# Patient Record
Sex: MALE | Race: WHITE | Employment: FULL TIME | URBAN - METROPOLITAN AREA
[De-identification: names, ages, dates, MRNs, and addresses within clinical notes are randomized per-mention and may not be internally consistent; named-entity substitution may affect disease eponyms.]

---

## 2017-01-30 RX ORDER — TRAMADOL HYDROCHLORIDE 50 MG/1
50 TABLET ORAL EVERY 6 HOURS PRN
Qty: 60 TABLET | Refills: 0 | Status: SHIPPED | OUTPATIENT
Start: 2017-01-30 | End: 2017-02-09

## 2017-02-06 ENCOUNTER — OFFICE VISIT (OUTPATIENT)
Dept: ORTHOPEDIC SURGERY | Age: 52
End: 2017-02-06

## 2017-02-06 ENCOUNTER — TELEPHONE (OUTPATIENT)
Dept: ORTHOPEDIC SURGERY | Age: 52
End: 2017-02-06

## 2017-02-06 VITALS
WEIGHT: 253 LBS | DIASTOLIC BLOOD PRESSURE: 79 MMHG | TEMPERATURE: 98.2 F | SYSTOLIC BLOOD PRESSURE: 136 MMHG | HEART RATE: 86 BPM | BODY MASS INDEX: 35.42 KG/M2 | HEIGHT: 71 IN

## 2017-02-06 DIAGNOSIS — M25.562 LEFT KNEE PAIN, UNSPECIFIED CHRONICITY: Primary | ICD-10-CM

## 2017-02-06 PROCEDURE — 73562 X-RAY EXAM OF KNEE 3: CPT | Performed by: PHYSICIAN ASSISTANT

## 2017-02-06 PROCEDURE — 99213 OFFICE O/P EST LOW 20 MIN: CPT | Performed by: PHYSICIAN ASSISTANT

## 2017-02-08 ENCOUNTER — TELEPHONE (OUTPATIENT)
Dept: ORTHOPEDIC SURGERY | Age: 52
End: 2017-02-08

## 2017-02-09 ENCOUNTER — HOSPITAL ENCOUNTER (OUTPATIENT)
Dept: MRI IMAGING | Age: 52
Discharge: HOME | End: 2017-02-09
Attending: ORTHOPAEDIC SURGERY | Admitting: ORTHOPAEDIC SURGERY

## 2017-02-09 DIAGNOSIS — M25.562 LEFT KNEE PAIN, UNSPECIFIED CHRONICITY: ICD-10-CM

## 2017-02-09 DIAGNOSIS — M25.562 PAIN IN LEFT KNEE: ICD-10-CM

## 2017-02-13 ENCOUNTER — TELEPHONE (OUTPATIENT)
Dept: ORTHOPEDIC SURGERY | Age: 52
End: 2017-02-13

## 2017-02-13 ENCOUNTER — OFFICE VISIT (OUTPATIENT)
Dept: ORTHOPEDIC SURGERY | Age: 52
End: 2017-02-13

## 2017-02-13 VITALS
WEIGHT: 253 LBS | HEART RATE: 72 BPM | BODY MASS INDEX: 35.42 KG/M2 | SYSTOLIC BLOOD PRESSURE: 138 MMHG | HEIGHT: 71 IN | TEMPERATURE: 97.9 F | DIASTOLIC BLOOD PRESSURE: 85 MMHG

## 2017-02-13 DIAGNOSIS — M17.12 PRIMARY OSTEOARTHRITIS OF LEFT KNEE: Primary | ICD-10-CM

## 2017-02-13 PROCEDURE — 99214 OFFICE O/P EST MOD 30 MIN: CPT | Performed by: ORTHOPAEDIC SURGERY

## 2017-02-13 PROCEDURE — 20610 DRAIN/INJ JOINT/BURSA W/O US: CPT | Performed by: ORTHOPAEDIC SURGERY

## 2017-02-27 ENCOUNTER — OFFICE VISIT (OUTPATIENT)
Dept: ORTHOPEDIC SURGERY | Age: 52
End: 2017-02-27

## 2017-02-27 DIAGNOSIS — M17.11 PRIMARY OSTEOARTHRITIS OF RIGHT KNEE: ICD-10-CM

## 2017-02-27 DIAGNOSIS — M17.12 PRIMARY OSTEOARTHRITIS OF LEFT KNEE: Primary | ICD-10-CM

## 2017-02-27 PROCEDURE — L1812 KO ELASTIC W/JOINTS PRE OTS: HCPCS | Performed by: PHYSICIAN ASSISTANT

## 2017-02-27 PROCEDURE — 20610 DRAIN/INJ JOINT/BURSA W/O US: CPT | Performed by: PHYSICIAN ASSISTANT

## 2017-02-27 PROCEDURE — 99213 OFFICE O/P EST LOW 20 MIN: CPT | Performed by: PHYSICIAN ASSISTANT

## 2017-02-27 RX ORDER — TRAMADOL HYDROCHLORIDE 50 MG/1
50 TABLET ORAL EVERY 6 HOURS PRN
Qty: 60 TABLET | Refills: 0 | Status: SHIPPED | OUTPATIENT
Start: 2017-02-27 | End: 2017-03-09

## 2017-03-06 ENCOUNTER — OFFICE VISIT (OUTPATIENT)
Dept: ORTHOPEDIC SURGERY | Age: 52
End: 2017-03-06

## 2017-03-06 VITALS — WEIGHT: 248 LBS | BODY MASS INDEX: 33.59 KG/M2 | HEIGHT: 72 IN

## 2017-03-06 DIAGNOSIS — M17.0 PRIMARY OSTEOARTHRITIS OF BOTH KNEES: Primary | ICD-10-CM

## 2017-03-06 PROCEDURE — 20610 DRAIN/INJ JOINT/BURSA W/O US: CPT | Performed by: PHYSICIAN ASSISTANT

## 2017-03-13 ENCOUNTER — OFFICE VISIT (OUTPATIENT)
Dept: ORTHOPEDIC SURGERY | Age: 52
End: 2017-03-13

## 2017-03-13 DIAGNOSIS — M17.11 PRIMARY OSTEOARTHRITIS OF RIGHT KNEE: Primary | ICD-10-CM

## 2017-03-13 PROCEDURE — 20610 DRAIN/INJ JOINT/BURSA W/O US: CPT | Performed by: PHYSICIAN ASSISTANT

## 2017-03-14 ENCOUNTER — TELEPHONE (OUTPATIENT)
Dept: ORTHOPEDIC SURGERY | Age: 52
End: 2017-03-14

## 2017-05-24 RX ORDER — TRAMADOL HYDROCHLORIDE 50 MG/1
50 TABLET ORAL EVERY 8 HOURS PRN
Qty: 30 TABLET | Refills: 0 | Status: SHIPPED | OUTPATIENT
Start: 2017-05-24 | End: 2017-07-10 | Stop reason: SDUPTHER

## 2017-07-10 RX ORDER — TRAMADOL HYDROCHLORIDE 50 MG/1
50 TABLET ORAL EVERY 8 HOURS PRN
Qty: 30 TABLET | Refills: 0 | Status: SHIPPED | OUTPATIENT
Start: 2017-07-10 | End: 2017-07-13 | Stop reason: SDUPTHER

## 2017-07-13 ENCOUNTER — TELEPHONE (OUTPATIENT)
Dept: ORTHOPEDIC SURGERY | Age: 52
End: 2017-07-13

## 2017-07-13 ENCOUNTER — OFFICE VISIT (OUTPATIENT)
Dept: ORTHOPEDIC SURGERY | Age: 52
End: 2017-07-13

## 2017-07-13 VITALS
WEIGHT: 248 LBS | BODY MASS INDEX: 34.72 KG/M2 | HEART RATE: 60 BPM | HEIGHT: 71 IN | DIASTOLIC BLOOD PRESSURE: 86 MMHG | RESPIRATION RATE: 16 BRPM | TEMPERATURE: 96.6 F | SYSTOLIC BLOOD PRESSURE: 135 MMHG

## 2017-07-13 DIAGNOSIS — M17.11 PRIMARY OSTEOARTHRITIS OF RIGHT KNEE: ICD-10-CM

## 2017-07-13 DIAGNOSIS — M17.12 PRIMARY OSTEOARTHRITIS OF LEFT KNEE: Primary | ICD-10-CM

## 2017-07-13 PROCEDURE — 99213 OFFICE O/P EST LOW 20 MIN: CPT | Performed by: PHYSICIAN ASSISTANT

## 2017-07-13 PROCEDURE — 20610 DRAIN/INJ JOINT/BURSA W/O US: CPT | Performed by: PHYSICIAN ASSISTANT

## 2017-07-13 RX ORDER — TRAMADOL HYDROCHLORIDE 50 MG/1
50 TABLET ORAL EVERY 8 HOURS PRN
Qty: 30 TABLET | Refills: 0 | Status: SHIPPED | OUTPATIENT
Start: 2017-07-13 | End: 2017-07-24

## 2017-07-14 ENCOUNTER — TELEPHONE (OUTPATIENT)
Dept: ORTHOPEDIC SURGERY | Age: 52
End: 2017-07-14

## 2017-07-24 ENCOUNTER — OFFICE VISIT (OUTPATIENT)
Dept: SURGERY | Age: 52
End: 2017-07-24

## 2017-07-24 VITALS — BODY MASS INDEX: 34.73 KG/M2 | DIASTOLIC BLOOD PRESSURE: 70 MMHG | WEIGHT: 249 LBS | SYSTOLIC BLOOD PRESSURE: 110 MMHG

## 2017-07-24 DIAGNOSIS — R10.33 PERIUMBILICAL PAIN: Primary | ICD-10-CM

## 2017-07-24 PROCEDURE — 99999 PR OFFICE/OUTPT VISIT,PROCEDURE ONLY: CPT | Performed by: SURGERY

## 2017-07-28 ENCOUNTER — PROCEDURE VISIT (OUTPATIENT)
Dept: SURGERY | Age: 52
End: 2017-07-28

## 2017-07-28 VITALS — SYSTOLIC BLOOD PRESSURE: 130 MMHG | DIASTOLIC BLOOD PRESSURE: 76 MMHG

## 2017-07-28 DIAGNOSIS — R10.33 PERIUMBILICAL PAIN: Primary | ICD-10-CM

## 2017-07-28 PROCEDURE — 99024 POSTOP FOLLOW-UP VISIT: CPT | Performed by: SURGERY

## 2017-09-08 ENCOUNTER — OFFICE VISIT (OUTPATIENT)
Dept: ORTHOPEDIC SURGERY | Age: 52
End: 2017-09-08

## 2017-09-08 ENCOUNTER — TELEPHONE (OUTPATIENT)
Dept: ORTHOPEDIC SURGERY | Age: 52
End: 2017-09-08

## 2017-09-08 VITALS
DIASTOLIC BLOOD PRESSURE: 95 MMHG | RESPIRATION RATE: 17 BRPM | HEIGHT: 71 IN | SYSTOLIC BLOOD PRESSURE: 142 MMHG | WEIGHT: 248.9 LBS | BODY MASS INDEX: 34.85 KG/M2 | HEART RATE: 84 BPM

## 2017-09-08 DIAGNOSIS — M17.0 PRIMARY OSTEOARTHRITIS OF BOTH KNEES: Primary | ICD-10-CM

## 2017-09-08 PROCEDURE — 99213 OFFICE O/P EST LOW 20 MIN: CPT | Performed by: PHYSICIAN ASSISTANT

## 2017-09-08 PROCEDURE — 20610 DRAIN/INJ JOINT/BURSA W/O US: CPT | Performed by: PHYSICIAN ASSISTANT

## 2017-09-08 RX ORDER — TRAMADOL HYDROCHLORIDE 50 MG/1
50 TABLET ORAL PRN
Qty: 30 TABLET | Refills: 0 | Status: SHIPPED | OUTPATIENT
Start: 2017-09-08 | End: 2017-09-28 | Stop reason: SDUPTHER

## 2017-09-12 ENCOUNTER — TELEPHONE (OUTPATIENT)
Dept: ORTHOPEDIC SURGERY | Age: 52
End: 2017-09-12

## 2017-09-13 ENCOUNTER — TELEPHONE (OUTPATIENT)
Dept: ORTHOPEDIC SURGERY | Age: 52
End: 2017-09-13

## 2017-09-15 ENCOUNTER — OFFICE VISIT (OUTPATIENT)
Dept: ORTHOPEDIC SURGERY | Age: 52
End: 2017-09-15

## 2017-09-15 VITALS — WEIGHT: 248 LBS | HEIGHT: 70 IN | BODY MASS INDEX: 35.5 KG/M2

## 2017-09-15 DIAGNOSIS — M25.561 CHRONIC PAIN OF BOTH KNEES: Primary | ICD-10-CM

## 2017-09-15 DIAGNOSIS — M25.562 CHRONIC PAIN OF BOTH KNEES: Primary | ICD-10-CM

## 2017-09-15 DIAGNOSIS — G89.29 CHRONIC PAIN OF BOTH KNEES: Primary | ICD-10-CM

## 2017-09-15 DIAGNOSIS — M17.0 PRIMARY OSTEOARTHRITIS OF BOTH KNEES: ICD-10-CM

## 2017-09-15 PROCEDURE — 99213 OFFICE O/P EST LOW 20 MIN: CPT | Performed by: PHYSICIAN ASSISTANT

## 2017-09-15 PROCEDURE — 20610 DRAIN/INJ JOINT/BURSA W/O US: CPT | Performed by: PHYSICIAN ASSISTANT

## 2017-09-25 ENCOUNTER — OFFICE VISIT (OUTPATIENT)
Dept: ORTHOPEDIC SURGERY | Age: 52
End: 2017-09-25

## 2017-09-25 VITALS — BODY MASS INDEX: 35.5 KG/M2 | HEIGHT: 70 IN | WEIGHT: 248 LBS

## 2017-09-25 DIAGNOSIS — M17.0 PRIMARY OSTEOARTHRITIS OF BOTH KNEES: Primary | ICD-10-CM

## 2017-09-25 PROCEDURE — 20610 DRAIN/INJ JOINT/BURSA W/O US: CPT | Performed by: PHYSICIAN ASSISTANT

## 2017-09-28 ENCOUNTER — TELEPHONE (OUTPATIENT)
Dept: ORTHOPEDIC SURGERY | Age: 52
End: 2017-09-28

## 2017-09-28 RX ORDER — TRAMADOL HYDROCHLORIDE 50 MG/1
50 TABLET ORAL EVERY 8 HOURS PRN
Qty: 30 TABLET | Refills: 0 | Status: SHIPPED | OUTPATIENT
Start: 2017-09-28 | End: 2017-10-16 | Stop reason: SDUPTHER

## 2017-10-05 ENCOUNTER — OFFICE VISIT (OUTPATIENT)
Dept: ORTHOPEDIC SURGERY | Age: 52
End: 2017-10-05

## 2017-10-05 DIAGNOSIS — M17.0 PRIMARY OSTEOARTHRITIS OF BOTH KNEES: Primary | ICD-10-CM

## 2017-10-05 PROCEDURE — 20610 DRAIN/INJ JOINT/BURSA W/O US: CPT | Performed by: PHYSICIAN ASSISTANT

## 2017-10-05 NOTE — MR AVS SNAPSHOT
After Visit Summary             Rey Garcia   10/5/2017 3:45 PM   Office Visit    Description:  Male : 1965   Provider:  BROCK Reynolds   Department:  [address] [facility]              Your Follow-Up and Future Appointments         Below is a list of your follow-up and future appointments. This may not be a complete list as you may have made appointments directly with providers that we are not aware of or your providers may have made some for you. Please call your providers to confirm appointments. It is important to keep your appointments. Please bring your current insurance card, photo ID, co-pay, and all medication bottles to your appointment. If self-pay, payment is expected at the time of service. Your To-Do List     Future Appointments Provider Department Dept Phone    3/15/2018 1:30 PM Moisés Rodas MD [address] [facility] [phone]    3/21/2018 7:30 AM Moisés Rodas MD; [address] [facility] [phone]    3/21/2018 7:35 AM Moisés Rodas MD [facility] [phone]    2018 10:00 AM Moisés Rodas MD [address] [facility] [phone]         Information from Your Visit        Department     Name Address Phone Fax    [address] [facility] [address] [facility]) [address] [place] [address] [address] [address] [phone]      You Were Seen for:         Comments    Primary osteoarthritis of both knees   [207895]         Vital Signs     Smoking Status                   Never Smoker           Additional Information about your Body Mass Index (BMI)           Your BMI as listed above is considered obese (30 or more). BMI is an estimate of body fat, calculated from your height and weight.   The higher your BMI, the greater your risk of heart disease, high blood pressure, type 2 diabetes, stroke, gallstones, arthritis, sleep apnea, and certain cancers. BMI is not perfect. It may overestimate body fat in athletes and people who are more muscular. Even a small weight loss (between 5 and 10 percent of your current weight) by decreasing your calorie intake and becoming more physically active will help lower your risk of developing or worsening diseases associated with obesity.      Learn more at: [employer]             Medications and Orders      Your Current Medications Are              traMADol (ULTRAM) 50 MG tablet Take 1 tablet by mouth every 8 hours as needed for Pain    diclofenac sodium (VOLTAREN) 1 % GEL APPLY 4 GRAMS TO AFFECTED AREA(S) FOUR TIMES A DAY    Lansoprazole (PREVACID PO) Take 30 mg by mouth daily       Allergies              Celebrex [Celecoxib] Itching    Aspirin-acetaminophen-caffeine Rash    Pt states can take Aspirin, tylenol and caffeine separately but not combined    Naprosyn [Naproxen] Rash    Mostly redness in the face    Other Rash    Red dye      We Ordered/Performed the following           20610 - MO DRAIN/INJECT LARGE JOINT/BURSA     EUFLEXXA INJ PER DOSE          Additional Information        Basic Information     Date Of Birth Sex Race Ethnicity Preferred Language    1965 Male White Non-/Non  English      Problem List as of 10/5/2017  Date Reviewed: 9/18/2017                Primary osteoarthritis of both knees    Metatarsalgia of right foot    Diastasis recti    Cubital tunnel syndrome    Carpal tunnel syndrome    Left knee DJD    Right knee DJD    Good's esophagus with esophagitis    Chondromalacia of left knee      Immunizations as of 10/5/2017     Name Date    Influenza Virus Vaccine 9/2/2015, 10/1/2014    Influenza Whole 10/20/2013    Tdap (Boostrix, Adacel) 1/1/2011      Preventive Care        Date Due    Hepatitis C screening is recommended for all adults regardless of risk factors born between [facility] at least once (lifetime) who have never been tested. 1965    HIV screening is recommended for all people regardless of risk factors  aged 15-65 years at least once (lifetime) who have never been HIV tested. 6/30/1978    Yearly Flu Vaccine (1) 9/1/2017    Tetanus Combination Vaccine (2 - Td) 1/1/2021    Cholesterol Screening 10/13/2021    Colonoscopy 7/6/2025            [employer] Signup           Our records indicate that you have an active [employer] account. You can view your After Visit Summary by going to [email or web address] and logging in with your [employer] username and password. If you don't have a [employer] username and password but a parent or guardian has access to your record, the parent or guardian should login with their own [employer] username and password and access your record to view the After Visit Summary. Additional Information  If you have questions, please contact the physician practice where you receive care. Remember, [employer] is NOT to be used for urgent needs. For medical emergencies, dial 911. For questions regarding your [employer] account call [phone]. If you have a clinical question, please call your doctor's office.

## 2017-10-16 RX ORDER — TRAMADOL HYDROCHLORIDE 50 MG/1
50 TABLET ORAL EVERY 8 HOURS PRN
Qty: 30 TABLET | Refills: 0 | Status: SHIPPED | OUTPATIENT
Start: 2017-10-16 | End: 2017-11-06 | Stop reason: SDUPTHER

## 2017-10-16 NOTE — TELEPHONE ENCOUNTER
Patient requesting a refill of Tramadol 50 mg, last filled 9/28/17  Preferred pharmacy -[facility] - [phone]    Patient can be reached at [phone]

## 2017-10-16 NOTE — TELEPHONE ENCOUNTER
Per FXF ok to refill  The patient has been issued narcotics to safely reduce postoperative pain and promote tolerance with physical therapies and ADL's. Reduction in dosing will be addressed with the next narcotic refill request.  Dosing is adjusted for patients with a history of chronic pain disorders.

## 2017-10-20 ENCOUNTER — TELEPHONE (OUTPATIENT)
Dept: ORTHOPEDIC SURGERY | Age: 52
End: 2017-10-20

## 2017-11-06 ENCOUNTER — TELEPHONE (OUTPATIENT)
Dept: ORTHOPEDIC SURGERY | Age: 52
End: 2017-11-06

## 2017-11-06 RX ORDER — TRAMADOL HYDROCHLORIDE 50 MG/1
50 TABLET ORAL EVERY 8 HOURS PRN
Qty: 30 TABLET | Refills: 0 | Status: SHIPPED | OUTPATIENT
Start: 2017-11-06 | End: 2017-11-27 | Stop reason: SDUPTHER

## 2017-11-06 NOTE — TELEPHONE ENCOUNTER
The patient has been issued narcotics to safely reduce postoperative pain and promote tolerance with physical therapies and ADL's. Reduction in dosing will be addressed with the next narcotic refill request.  Dosing is adjusted for patients with a history of chronic pain disorders.

## 2017-11-06 NOTE — TELEPHONE ENCOUNTER
Patient requesting a refill of tramadol 50 mg, last filled 10/16/17  Preferred pharmacy- [facility] [address]  - [phone]  Patient can be reached at [phone]

## 2017-11-08 ENCOUNTER — PAT TELEPHONE (OUTPATIENT)
Dept: PREADMISSION TESTING | Age: 52
End: 2017-11-08

## 2017-11-08 VITALS — HEIGHT: 72 IN | BODY MASS INDEX: 19.91 KG/M2 | WEIGHT: 147 LBS

## 2017-11-08 NOTE — PROGRESS NOTES
have a living will and a durable power of  for healthcare, please bring in a copy. As part of our patient safety program to minimize surgical site infections, we ask you to do the following:    · Please notify your surgeon if you develop any illness between         now and the  day of your surgery. · This includes a cough, cold, fever, sore throat, nausea,         or vomiting, and diarrhea, etc.  ·  Please notify your surgeon if you experience dizziness, shortness         of breath or blurred vision between now and the time of your surgery. You may shower the night before surgery or the morning of   your surgery with an antibacterial soap. You will need to bring a photo ID and insurance card    [facility] has an onsite pharmacy, would you like to utilize our pharmacy     If you will be staying overnight and use a C-pap machine, please bring   your C-pap to hospital     Our goal is to provide you with excellent care, therefore, visitors will be limited to two(2) in the room at a time so that we may focus on providing this care for you. Please contact pre-admission testing if you have any further questions. [facility] phone number:  [phone]  Please note these are generalized instructions for all surgical cases, you may be provided with more specific instructions according to your surgery.

## 2017-11-15 ENCOUNTER — SURG/PROC ORDERS (OUTPATIENT)
Dept: ANESTHESIOLOGY | Age: 52
End: 2017-11-15

## 2017-11-15 RX ORDER — SODIUM CHLORIDE 0.9 % (FLUSH) 0.9 %
10 SYRINGE (ML) INJECTION PRN
Status: CANCELLED | OUTPATIENT
Start: 2017-11-15

## 2017-11-15 RX ORDER — SODIUM CHLORIDE 9 MG/ML
INJECTION, SOLUTION INTRAVENOUS CONTINUOUS
Status: CANCELLED | OUTPATIENT
Start: 2017-11-15

## 2017-11-15 RX ORDER — SODIUM CHLORIDE 0.9 % (FLUSH) 0.9 %
10 SYRINGE (ML) INJECTION EVERY 12 HOURS SCHEDULED
Status: CANCELLED | OUTPATIENT
Start: 2017-11-15

## 2017-11-16 ENCOUNTER — HOSPITAL ENCOUNTER (OUTPATIENT)
Dept: ENDOSCOPY | Age: 52
Discharge: HOME | End: 2017-11-16
Attending: INTERNAL MEDICINE | Admitting: INTERNAL MEDICINE

## 2017-11-16 VITALS
OXYGEN SATURATION: 97 % | BODY MASS INDEX: 33.46 KG/M2 | WEIGHT: 247 LBS | SYSTOLIC BLOOD PRESSURE: 129 MMHG | DIASTOLIC BLOOD PRESSURE: 88 MMHG | HEIGHT: 72 IN | RESPIRATION RATE: 16 BRPM | TEMPERATURE: 97.4 F | HEART RATE: 86 BPM

## 2017-11-16 RX ORDER — SODIUM CHLORIDE 9 MG/ML
INJECTION, SOLUTION INTRAVENOUS CONTINUOUS
Status: DISCONTINUED | OUTPATIENT
Start: 2017-11-16 | End: 2017-11-17 | Stop reason: HOSPADM

## 2017-11-16 RX ORDER — NAPROXEN SODIUM 220 MG
220 TABLET ORAL 2 TIMES DAILY
Status: ON HOLD | COMMUNITY
End: 2018-03-22 | Stop reason: HOSPADM

## 2017-11-16 RX ORDER — SODIUM CHLORIDE 0.9 % (FLUSH) 0.9 %
10 SYRINGE (ML) INJECTION EVERY 12 HOURS SCHEDULED
Status: DISCONTINUED | OUTPATIENT
Start: 2017-11-16 | End: 2017-11-17 | Stop reason: HOSPADM

## 2017-11-16 RX ORDER — ONDANSETRON 2 MG/ML
4 INJECTION INTRAMUSCULAR; INTRAVENOUS
Status: ACTIVE | OUTPATIENT
Start: 2017-11-16 | End: 2017-11-16

## 2017-11-16 RX ORDER — SODIUM CHLORIDE 0.9 % (FLUSH) 0.9 %
10 SYRINGE (ML) INJECTION PRN
Status: DISCONTINUED | OUTPATIENT
Start: 2017-11-16 | End: 2017-11-17 | Stop reason: HOSPADM

## 2017-11-16 RX ADMIN — SODIUM CHLORIDE: 9 INJECTION, SOLUTION INTRAVENOUS at 09:31

## 2017-11-16 ASSESSMENT — ENCOUNTER SYMPTOMS: SHORTNESS OF BREATH: 0

## 2017-11-16 ASSESSMENT — LIFESTYLE VARIABLES: SMOKING_STATUS: 0

## 2017-11-16 ASSESSMENT — PAIN SCALES - GENERAL: PAINLEVEL_OUTOF10: 0

## 2017-11-16 ASSESSMENT — PAIN - FUNCTIONAL ASSESSMENT: PAIN_FUNCTIONAL_ASSESSMENT: 0-10

## 2017-11-16 NOTE — PROCEDURES
[address]                                  PROCEDURE NOTE    PATIENT NAME: Eric Erazo                  :        1965  MED REC NO:   [id number]                          ROOM:  ACCOUNT NO:   [de-identified]                          ADMIT DATE: 2017  PROVIDER:     Carmenza Gamboa MD    EGD NOTE    DATE OF PROCEDURE:  2017    REFERRING PROVIDER:  BROCK Martel    PATIENT HISTORY:  A 49-year-old male, outpatient. INSTRUMENT USED:  Olympus GIF-Q180. MEDICATIONS OF PROCEDURE:  The patient was premedicated with Diprivan  intravenously as administered by the anesthesiology service. INDICATIONS:  The patient has presented with recent early satiety and  weight loss. This could be related to his recent use of tramadol. Exam is  being performed to rule out a structural upper GI tract process. DESCRIPTION OF PROCEDURE:  The endoscope was inserted into the esophagus  without difficulty. The diaphragm was located at 42 cm with the Z-line at  38 cm. Several very tiny islands of apparent Good's metaplasia extended  above this level. These had been biopsied previously. The esophageal  mucosa was otherwise normal.  The stomach, duodenal bulb, and descending  duodenum were normal.    IMPRESSION:  A 4-cm hiatal hernia only. PLAN:  If the patient's symptoms have not resolved with stopping the  tramadol, I will obtain a CT scan of the abdomen, primarily to image the  pancreas. Toña Crisostomo MD    D: 2017 10:03:48       T: 2017 10:05:22     [email or web address]  Job#: [id number]     Doc#: [id number]    CC:   Dori Martel MD

## 2017-11-16 NOTE — BRIEF OP NOTE
Brief Postoperative Note    Anna Marie  YOB: 1965  [id number]    Pre-operative Diagnosis: Early satiety, weight loss    Post-operative Diagnosis: Same    Procedure: EGD    Anesthesia: MAC    Surgeons/Assistants: Federica    Estimated Blood Loss: None    Complications: None    Specimens: Was Not Obtained    Findings: See dictated report    Electronically signed by Shmuel Davis MD on 11/16/2017 at 9:49 AM

## 2017-11-27 ENCOUNTER — TELEPHONE (OUTPATIENT)
Dept: ORTHOPEDIC SURGERY | Age: 52
End: 2017-11-27

## 2017-11-27 RX ORDER — TRAMADOL HYDROCHLORIDE 50 MG/1
50 TABLET ORAL EVERY 8 HOURS PRN
Qty: 30 TABLET | Refills: 0 | Status: SHIPPED | OUTPATIENT
Start: 2017-11-27 | End: 2017-12-11 | Stop reason: SDUPTHER

## 2017-12-01 ENCOUNTER — TELEPHONE (OUTPATIENT)
Dept: FAMILY MEDICINE CLINIC | Age: 52
End: 2017-12-01

## 2017-12-11 ENCOUNTER — TELEPHONE (OUTPATIENT)
Dept: ORTHOPEDIC SURGERY | Age: 52
End: 2017-12-11

## 2017-12-11 RX ORDER — TRAMADOL HYDROCHLORIDE 50 MG/1
50 TABLET ORAL EVERY 8 HOURS PRN
Qty: 30 TABLET | Refills: 0 | Status: SHIPPED | OUTPATIENT
Start: 2017-12-11 | End: 2017-12-22 | Stop reason: SDUPTHER

## 2017-12-11 NOTE — TELEPHONE ENCOUNTER
Refill:    Tramadol 50mg   Last filled: 11/27 [address] on [employer]    Please call   [phone]

## 2017-12-20 ENCOUNTER — INITIAL CONSULT (OUTPATIENT)
Dept: SURGERY | Age: 52
End: 2017-12-20

## 2017-12-20 VITALS — DIASTOLIC BLOOD PRESSURE: 90 MMHG | SYSTOLIC BLOOD PRESSURE: 122 MMHG | WEIGHT: 248 LBS | BODY MASS INDEX: 33.63 KG/M2

## 2017-12-20 DIAGNOSIS — M62.08 DIASTASIS RECTI: Primary | ICD-10-CM

## 2017-12-20 PROCEDURE — 99213 OFFICE O/P EST LOW 20 MIN: CPT | Performed by: SURGERY

## 2017-12-22 ENCOUNTER — OFFICE VISIT (OUTPATIENT)
Dept: FAMILY MEDICINE CLINIC | Age: 52
End: 2017-12-22

## 2017-12-22 VITALS
DIASTOLIC BLOOD PRESSURE: 86 MMHG | HEIGHT: 72 IN | WEIGHT: 245.7 LBS | HEART RATE: 83 BPM | OXYGEN SATURATION: 98 % | SYSTOLIC BLOOD PRESSURE: 120 MMHG | BODY MASS INDEX: 33.28 KG/M2

## 2017-12-22 DIAGNOSIS — K22.70 BARRETT'S ESOPHAGUS WITH ESOPHAGITIS: ICD-10-CM

## 2017-12-22 DIAGNOSIS — K20.90 BARRETT'S ESOPHAGUS WITH ESOPHAGITIS: ICD-10-CM

## 2017-12-22 DIAGNOSIS — M17.0 PRIMARY OSTEOARTHRITIS OF BOTH KNEES: ICD-10-CM

## 2017-12-22 DIAGNOSIS — M62.08 DIASTASIS RECTI: ICD-10-CM

## 2017-12-22 DIAGNOSIS — Z00.00 PREVENTATIVE HEALTH CARE: Primary | ICD-10-CM

## 2017-12-22 PROCEDURE — 99396 PREV VISIT EST AGE 40-64: CPT | Performed by: PHYSICIAN ASSISTANT

## 2017-12-22 RX ORDER — TRAMADOL HYDROCHLORIDE 50 MG/1
50 TABLET ORAL 3 TIMES DAILY PRN
Qty: 60 TABLET | Refills: 2 | Status: ON HOLD | OUTPATIENT
Start: 2017-12-22 | End: 2018-03-21 | Stop reason: HOSPADM

## 2017-12-22 ASSESSMENT — ENCOUNTER SYMPTOMS
EYE PAIN: 0
SHORTNESS OF BREATH: 0
SORE THROAT: 0
DIARRHEA: 0
CHEST TIGHTNESS: 0
CONSTIPATION: 0
VOICE CHANGE: 0
TROUBLE SWALLOWING: 0
BACK PAIN: 0
COUGH: 0
ABDOMINAL PAIN: 0

## 2018-01-26 ENCOUNTER — TELEPHONE (OUTPATIENT)
Dept: FAMILY MEDICINE CLINIC | Age: 53
End: 2018-01-26

## 2018-01-26 ENCOUNTER — OFFICE VISIT (OUTPATIENT)
Dept: ORTHOPEDIC SURGERY | Age: 53
End: 2018-01-26

## 2018-01-26 VITALS
DIASTOLIC BLOOD PRESSURE: 84 MMHG | HEART RATE: 69 BPM | SYSTOLIC BLOOD PRESSURE: 136 MMHG | BODY MASS INDEX: 33.18 KG/M2 | RESPIRATION RATE: 16 BRPM | HEIGHT: 72 IN | WEIGHT: 245 LBS

## 2018-01-26 DIAGNOSIS — M17.0 PRIMARY OSTEOARTHRITIS OF BOTH KNEES: Primary | ICD-10-CM

## 2018-01-26 PROCEDURE — 99213 OFFICE O/P EST LOW 20 MIN: CPT | Performed by: PHYSICIAN ASSISTANT

## 2018-01-26 NOTE — LETTER
[facility]  [address] [facility]  [address] [facility] [address]  Phone: [phone]  Fax: [address] [facility], [address]        January 26, 2018     Patient: Emily Precise   YOB: 1965   Date of Visit: 1/26/2018       To Whom It May Concern: It is my medical opinion that Cheryl Denis requires a disability parking placard for the following reasons:  He has limited walking ability due to an orthopedic condition. Duration of need: 2 years    If you have any questions or concerns, please don't hesitate to call.     Sincerely,          BROCK Brunson

## 2018-01-26 NOTE — TELEPHONE ENCOUNTER
traMADol Prisca Sosa 50 MG tablet         Insurance / [employer]   [id number]     Pharmacy:  [facility] [address], [address] [phone] - W [phone]      Prior Auth is required

## 2018-01-31 ENCOUNTER — TELEPHONE (OUTPATIENT)
Dept: ORTHOPEDIC SURGERY | Age: 53
End: 2018-01-31

## 2018-01-31 DIAGNOSIS — R52 PAIN: Primary | ICD-10-CM

## 2018-02-14 ENCOUNTER — TELEPHONE (OUTPATIENT)
Dept: ORTHOPEDIC SURGERY | Age: 53
End: 2018-02-14

## 2018-03-06 ENCOUNTER — HOSPITAL ENCOUNTER (OUTPATIENT)
Dept: PAIN MANAGEMENT | Age: 53
Discharge: HOME | End: 2018-03-06
Attending: ANESTHESIOLOGY | Admitting: ANESTHESIOLOGY

## 2018-03-06 VITALS
RESPIRATION RATE: 16 BRPM | WEIGHT: 255 LBS | TEMPERATURE: 98.3 F | HEIGHT: 72 IN | SYSTOLIC BLOOD PRESSURE: 130 MMHG | BODY MASS INDEX: 34.54 KG/M2 | HEART RATE: 92 BPM | OXYGEN SATURATION: 97 % | DIASTOLIC BLOOD PRESSURE: 102 MMHG

## 2018-03-06 RX ORDER — PANTOPRAZOLE SODIUM 40 MG/1
40 TABLET, DELAYED RELEASE ORAL DAILY
COMMUNITY
Start: 2018-01-26 | End: 2020-10-20 | Stop reason: SDUPTHER

## 2018-03-06 ASSESSMENT — PAIN - FUNCTIONAL ASSESSMENT: PAIN_FUNCTIONAL_ASSESSMENT: 0-10

## 2018-03-06 ASSESSMENT — PAIN DESCRIPTION - DESCRIPTORS: DESCRIPTORS: ACHING;CONSTANT

## 2018-03-09 NOTE — OP NOTE
the femur until contacted the bone (superior medial Genicular Nerve). The same steps repeated for the lateral side (superior lateral Genicular Nerve ) and for the tibial head on the medial side (inferior medial Genicular Nerve). On a lateral fluoroscopy image the needles made sure that the tip landed half way, Following placement of the needle a mixture of Depo Medrol 1/2 ml = 20 mg, mixed with 3 ml of 0.25% Bupivacaine, was injected in each needle. The needle was flushed and withdrawn, and a Band-Aid was applied. Disposition:   The patient was given written discharge instructions. The patient will return to clinic in 4 weeks for repeat of the injection.

## 2018-03-12 ENCOUNTER — PAT TELEPHONE (OUTPATIENT)
Dept: PREADMISSION TESTING | Age: 53
End: 2018-03-12

## 2018-03-12 DIAGNOSIS — Z01.818 ENCOUNTER FOR PREADMISSION TESTING: Primary | ICD-10-CM

## 2018-03-13 ENCOUNTER — HOSPITAL ENCOUNTER (OUTPATIENT)
Dept: PREADMISSION TESTING | Age: 53
Discharge: HOME | End: 2018-03-13
Attending: ORTHOPAEDIC SURGERY | Admitting: ORTHOPAEDIC SURGERY

## 2018-03-13 DIAGNOSIS — Z01.818 ENCOUNTER FOR PREADMISSION TESTING: ICD-10-CM

## 2018-03-13 LAB
ABO/RH: NORMAL
ALBUMIN SERPL-MCNC: 4.2 G/DL (ref 3.4–5)
ANION GAP SERPL CALCULATED.3IONS-SCNC: 14 MMOL/L (ref 3–16)
ANTIBODY SCREEN: NORMAL
APTT: 31.1 SEC (ref 24.1–34.9)
BASOPHILS ABSOLUTE: 0 K/UL (ref 0–0.2)
BASOPHILS RELATIVE PERCENT: 0.3 %
BILIRUBIN URINE: NEGATIVE
BLOOD, URINE: NEGATIVE
BUN BLDV-MCNC: 16 MG/DL (ref 7–20)
CALCIUM SERPL-MCNC: 8.9 MG/DL (ref 8.3–10.6)
CHLORIDE BLD-SCNC: 100 MMOL/L (ref 99–110)
CLARITY: CLEAR
CO2: 26 MMOL/L (ref 21–32)
COLOR: YELLOW
CREAT SERPL-MCNC: 0.8 MG/DL (ref 0.9–1.3)
EKG ATRIAL RATE: 83 BPM
EKG DIAGNOSIS: NORMAL
EKG P AXIS: 25 DEGREES
EKG P-R INTERVAL: 150 MS
EKG Q-T INTERVAL: 378 MS
EKG QRS DURATION: 90 MS
EKG QTC CALCULATION (BAZETT): 444 MS
EKG R AXIS: -21 DEGREES
EKG T AXIS: 64 DEGREES
EKG VENTRICULAR RATE: 83 BPM
EOSINOPHILS ABSOLUTE: 0.1 K/UL (ref 0–0.6)
EOSINOPHILS RELATIVE PERCENT: 1 %
ESTIMATED AVERAGE GLUCOSE: 102.5 MG/DL
GFR AFRICAN AMERICAN: >60
GFR NON-AFRICAN AMERICAN: >60
GLUCOSE BLD-MCNC: 136 MG/DL (ref 70–99)
GLUCOSE URINE: NEGATIVE MG/DL
HBA1C MFR BLD: 5.2 %
HCT VFR BLD CALC: 45.8 % (ref 40.5–52.5)
HEMOGLOBIN: 15.5 G/DL (ref 13.5–17.5)
INR BLD: 1.06 (ref 0.85–1.15)
KETONES, URINE: NEGATIVE MG/DL
LEUKOCYTE ESTERASE, URINE: NEGATIVE
LYMPHOCYTES ABSOLUTE: 1.9 K/UL (ref 1–5.1)
LYMPHOCYTES RELATIVE PERCENT: 20.9 %
MCH RBC QN AUTO: 29.9 PG (ref 26–34)
MCHC RBC AUTO-ENTMCNC: 33.9 G/DL (ref 31–36)
MCV RBC AUTO: 88.2 FL (ref 80–100)
MICROSCOPIC EXAMINATION: NORMAL
MONOCYTES ABSOLUTE: 0.6 K/UL (ref 0–1.3)
MONOCYTES RELATIVE PERCENT: 6.7 %
NEUTROPHILS ABSOLUTE: 6.3 K/UL (ref 1.7–7.7)
NEUTROPHILS RELATIVE PERCENT: 71.1 %
NITRITE, URINE: NEGATIVE
PDW BLD-RTO: 13.5 % (ref 12.4–15.4)
PH UA: 7
PLATELET # BLD: 307 K/UL (ref 135–450)
PMV BLD AUTO: 8.1 FL (ref 5–10.5)
POTASSIUM SERPL-SCNC: 4.3 MMOL/L (ref 3.5–5.1)
PREALBUMIN: 21.7 MG/DL (ref 20–40)
PROTEIN UA: NEGATIVE MG/DL
PROTHROMBIN TIME: 12 SEC (ref 9.6–13)
RBC # BLD: 5.19 M/UL (ref 4.2–5.9)
SEDIMENTATION RATE, ERYTHROCYTE: 8 MM/HR (ref 0–20)
SODIUM BLD-SCNC: 140 MMOL/L (ref 136–145)
SPECIFIC GRAVITY UA: 1.01
URINE REFLEX TO CULTURE: NORMAL
URINE TYPE: NORMAL
UROBILINOGEN, URINE: 0.2 E.U./DL
WBC # BLD: 8.9 K/UL (ref 4–11)

## 2018-03-14 ENCOUNTER — TELEPHONE (OUTPATIENT)
Dept: ORTHOPEDIC SURGERY | Age: 53
End: 2018-03-14

## 2018-03-14 LAB — MRSA SCREEN RT-PCR: NORMAL

## 2018-03-14 NOTE — TELEPHONE ENCOUNTER
Pt wants a script for a shower chair, the [address] [place] [address] Ruace and needs to have his surg before 11:00am because [facility] will pay him $1000 if he is admitted for 24 hrs.    Please call him at [phone]

## 2018-03-14 NOTE — PROGRESS NOTES
Pt. Attended JET class on 3/13/18. Pt. Verified surgery for Total Knee replacement and received patient information and educational JET folder. Interviews completed by PT, OT, Case management and PAT. Labs and Tests completed as ordered/necessary. Pt. Given instructions on Pre-operative Showering techniques and the use of anti-septic 3 days before surgery. Anti-septic bottle given to patient to take home. Pt. States no further questions or concerns.

## 2018-03-15 ENCOUNTER — OFFICE VISIT (OUTPATIENT)
Dept: FAMILY MEDICINE CLINIC | Age: 53
End: 2018-03-15

## 2018-03-15 ENCOUNTER — OFFICE VISIT (OUTPATIENT)
Dept: ORTHOPEDIC SURGERY | Age: 53
End: 2018-03-15

## 2018-03-15 VITALS
TEMPERATURE: 97.1 F | OXYGEN SATURATION: 98 % | HEIGHT: 72 IN | HEART RATE: 80 BPM | DIASTOLIC BLOOD PRESSURE: 86 MMHG | WEIGHT: 253 LBS | BODY MASS INDEX: 34.27 KG/M2 | SYSTOLIC BLOOD PRESSURE: 138 MMHG

## 2018-03-15 DIAGNOSIS — M25.561 CHRONIC PAIN OF RIGHT KNEE: Primary | ICD-10-CM

## 2018-03-15 DIAGNOSIS — Z01.818 PREOP EXAMINATION: Primary | ICD-10-CM

## 2018-03-15 DIAGNOSIS — G89.29 CHRONIC PAIN OF RIGHT KNEE: Primary | ICD-10-CM

## 2018-03-15 DIAGNOSIS — M17.11 PRIMARY OSTEOARTHRITIS OF RIGHT KNEE: ICD-10-CM

## 2018-03-15 PROCEDURE — 99999 PR OFFICE/OUTPT VISIT,PROCEDURE ONLY: CPT | Performed by: ORTHOPAEDIC SURGERY

## 2018-03-15 PROCEDURE — 99242 OFF/OP CONSLTJ NEW/EST SF 20: CPT | Performed by: PHYSICIAN ASSISTANT

## 2018-03-15 NOTE — PATIENT INSTRUCTIONS
Kaci Handler was seen today for pre-op exam.    Diagnoses and all orders for this visit:    Preop examination    Primary osteoarthritis of right knee       Cleared for surgery.

## 2018-03-15 NOTE — PROGRESS NOTES
I have reviewed the history and physical note and findings.
Refill    pantoprazole (PROTONIX) 40 MG tablet Take 40 mg by mouth daily         Social History   Substance Use Topics    Smoking status: Never Smoker    Smokeless tobacco: Never Used    Alcohol use 0.0 oz/week      Comment: socially     Family History   Problem Relation Age of Onset    Cancer Mother      breast    Cancer Father      prostate    Heart Disease Paternal Aunt        Review of Systems  A comprehensive review of systems was negative except for what was noted in the HPI. Physical Exam   /86 (Site: Left Arm, Position: Sitting, Cuff Size: Large Adult)   Pulse 80   Temp 97.1 °F (36.2 °C) (Tympanic)   Ht 5' 11.5\" (1.816 m)   Wt 253 lb (114.8 kg)   SpO2 98%   BMI 34.79 kg/m²   Weight: 253 lb (114.8 kg)   Constitutional: He is oriented to person, place, and time. He appears well-developed and well-nourished. No distress. HENT:   Head: Normocephalic and atraumatic. Mouth/Throat: Uvula is midline, oropharynx is clear and moist and mucous membranes are normal.   Eyes: Conjunctivae and EOM are normal. Pupils are equal, round, and reactive to light. Neck: Trachea normal and normal range of motion. Neck supple. No JVD present. Carotid bruit is not present. No mass and no thyromegaly present. Cardiovascular: Normal rate, regular rhythm, normal heart sounds and intact distal pulses. Exam reveals no gallop and no friction rub. No murmur heard. Pulmonary/Chest: Effort normal and breath sounds normal. No respiratory distress. He has no wheezes. He has no rales. Abdominal: Soft. Normal aorta and bowel sounds are normal. He exhibits no distension and no mass. There is no hepatosplenomegaly. No tenderness. Musculoskeletal: He exhibits no edema and no tenderness. Neurological: He is alert and oriented to person, place, and time. He has normal strength. No cranial nerve deficit or sensory deficit. Coordination and gait normal.   Skin: Skin is warm and dry. No rash noted. No erythema.

## 2018-03-17 NOTE — PROGRESS NOTES
X-rays obtained today standing AP lateral patellofemoral view of the right knee shows advanced tricompartmental degenerative osteoarthritis. Medial bone-on-bone deformity with complete loss of the medial tibiofemoral articular surface is noted. Lateral tibial subluxation with lateral osteophytes on both distal femur and proximal tibia noted. Significant patellofemoral degenerative joint disease is noted also. The patient is status post some type of ligamentous reconstruction, probable ACL with metallic staples are noted in both the lateral femur and proximal tibial metaphysis. No other obvious fractures tumors or dislocations are noted on these x-rays.

## 2018-03-19 ENCOUNTER — PAT TELEPHONE (OUTPATIENT)
Dept: PREADMISSION TESTING | Age: 53
End: 2018-03-19

## 2018-03-19 VITALS — WEIGHT: 253 LBS | HEIGHT: 72 IN | BODY MASS INDEX: 34.27 KG/M2

## 2018-03-19 RX ORDER — CELECOXIB 200 MG/1
200 CAPSULE ORAL ONCE
Status: CANCELLED | OUTPATIENT
Start: 2018-03-21

## 2018-03-19 RX ORDER — OXYCODONE HYDROCHLORIDE 5 MG/1
10 TABLET ORAL ONCE
Status: CANCELLED | OUTPATIENT
Start: 2018-03-21

## 2018-03-19 ASSESSMENT — PAIN SCALES - GENERAL: PAINLEVEL_OUTOF10: 8

## 2018-03-19 ASSESSMENT — PAIN DESCRIPTION - PAIN TYPE: TYPE: CHRONIC PAIN

## 2018-03-19 ASSESSMENT — PAIN - FUNCTIONAL ASSESSMENT: PAIN_FUNCTIONAL_ASSESSMENT: 0-10

## 2018-03-19 ASSESSMENT — PAIN DESCRIPTION - ORIENTATION: ORIENTATION: RIGHT

## 2018-03-19 ASSESSMENT — PAIN DESCRIPTION - FREQUENCY: FREQUENCY: CONTINUOUS

## 2018-03-19 NOTE — PRE-PROCEDURE INSTRUCTIONS
PRE-OP INSTRUCTIONS     · Do not eat or drink anything after 12:00 midnight prior to surgery. This includes water, chewing gum, mints and ice chips. You may brush your teeth and gargle the morning of surgery but DO  NOT SWALLOW THE WATER. Take the following medications with a small sip of water on the morning of surgery: protonix    · If you use an inhaler, please use it the morning of surgery and bring with you to hospital.    · You may be asked to stop blood thinners such as:  Coumadin, Plavix, Fragmin and lovenox. Please check with your doctor before stopping these or any other medications. · Aspirin, ibuprofen, advil and naproxen, any anti-inflammatory products should be stopped for a week prior to your surgery.-tramadol stopped per dr. Elbert Chow instructions    · Do not smoke and do not drink any alcoholic beverages 24 hours prior to your surgery. · Please do not wear any jewelry or body piercings on the day of surgery. · Please wear something simple, loose fitting clothing to the hospital.  Do not wear any make-up(including eye make-up) or nail polish on your fingers and toes. · As part of our patient safety program to minimize surgical infections, we ask you to do the followin. Please notify your surgeon if you develop any illness between now                          and the day of your surgery. This includes a cough, cold, fever, sore                       throat, nausea, vomiting, diarrhea, etc.  Also please notify your                                  surgeon if you experience dizziness, shortness of breath or                       Blurred vision between now and the time of your surgery. 2.  Please notify your surgeon of any open or redden areas that may                        look infected                     3.  DO NOT shave your operative site 96 hours(four days) prior to                                  surgery.                         4.

## 2018-03-19 NOTE — PRE-PROCEDURE INSTRUCTIONS
C-Difficile admission screening and protocol:     * Admitted with diarrhea? no     *Prior history of C-Diff. In last 3 months? no     *Antibiotic use in the past 6-8 weeks? no     *Prior hospitalization or nursing home in the last month?    no

## 2018-03-21 PROBLEM — M17.11 ARTHRITIS OF RIGHT KNEE: Status: ACTIVE | Noted: 2018-03-21

## 2018-03-26 ENCOUNTER — TELEPHONE (OUTPATIENT)
Dept: ORTHOPEDIC SURGERY | Age: 53
End: 2018-03-26

## 2018-03-27 ENCOUNTER — TELEPHONE (OUTPATIENT)
Dept: ORTHOPEDIC SURGERY | Age: 53
End: 2018-03-27

## 2018-03-27 DIAGNOSIS — Z96.651 STATUS POST TOTAL RIGHT KNEE REPLACEMENT: Primary | ICD-10-CM

## 2018-03-29 DIAGNOSIS — M17.11 ARTHRITIS OF RIGHT KNEE: ICD-10-CM

## 2018-03-29 RX ORDER — OXYCODONE HYDROCHLORIDE AND ACETAMINOPHEN 5; 325 MG/1; MG/1
1-2 TABLET ORAL EVERY 4 HOURS PRN
Qty: 60 TABLET | Refills: 0 | Status: SHIPPED | OUTPATIENT
Start: 2018-03-29 | End: 2018-04-09 | Stop reason: SDUPTHER

## 2018-04-05 ENCOUNTER — OFFICE VISIT (OUTPATIENT)
Dept: ORTHOPEDIC SURGERY | Age: 53
End: 2018-04-05

## 2018-04-05 VITALS — BODY MASS INDEX: 32.64 KG/M2 | TEMPERATURE: 97.5 F | HEIGHT: 72 IN | WEIGHT: 241 LBS

## 2018-04-05 DIAGNOSIS — M17.12 PRIMARY OSTEOARTHRITIS OF LEFT KNEE: ICD-10-CM

## 2018-04-05 DIAGNOSIS — Z96.651 HISTORY OF TOTAL KNEE ARTHROPLASTY, RIGHT: Primary | ICD-10-CM

## 2018-04-05 PROCEDURE — 99024 POSTOP FOLLOW-UP VISIT: CPT | Performed by: ORTHOPAEDIC SURGERY

## 2018-04-09 ENCOUNTER — HOSPITAL ENCOUNTER (OUTPATIENT)
Dept: PHYSICAL THERAPY | Age: 53
Discharge: HOME | End: 2018-04-30
Attending: ORTHOPAEDIC SURGERY | Admitting: ORTHOPAEDIC SURGERY

## 2018-04-09 ENCOUNTER — TELEPHONE (OUTPATIENT)
Dept: ORTHOPEDIC SURGERY | Age: 53
End: 2018-04-09

## 2018-04-09 DIAGNOSIS — M17.11 ARTHRITIS OF RIGHT KNEE: ICD-10-CM

## 2018-04-09 ASSESSMENT — PAIN DESCRIPTION - PAIN TYPE: TYPE: SURGICAL PAIN

## 2018-04-09 ASSESSMENT — PAIN DESCRIPTION - LOCATION: LOCATION: KNEE

## 2018-04-09 ASSESSMENT — PAIN DESCRIPTION - ORIENTATION: ORIENTATION: RIGHT

## 2018-04-09 ASSESSMENT — PAIN SCALES - GENERAL: PAINLEVEL_OUTOF10: 2

## 2018-04-10 RX ORDER — OXYCODONE HYDROCHLORIDE AND ACETAMINOPHEN 5; 325 MG/1; MG/1
1-2 TABLET ORAL EVERY 4 HOURS PRN
Qty: 60 TABLET | Refills: 0 | Status: SHIPPED | OUTPATIENT
Start: 2018-04-10 | End: 2018-04-17

## 2018-04-11 ENCOUNTER — TELEPHONE (OUTPATIENT)
Dept: ORTHOPEDIC SURGERY | Age: 53
End: 2018-04-11

## 2018-04-19 ENCOUNTER — OFFICE VISIT (OUTPATIENT)
Dept: ORTHOPEDIC SURGERY | Age: 53
End: 2018-04-19

## 2018-04-19 VITALS — TEMPERATURE: 98 F

## 2018-04-19 DIAGNOSIS — Z96.651 STATUS POST TOTAL RIGHT KNEE REPLACEMENT: Primary | ICD-10-CM

## 2018-04-19 PROCEDURE — 99024 POSTOP FOLLOW-UP VISIT: CPT | Performed by: PHYSICIAN ASSISTANT

## 2018-04-19 RX ORDER — HYDROCODONE BITARTRATE AND ACETAMINOPHEN 7.5; 325 MG/1; MG/1
1 TABLET ORAL EVERY 6 HOURS PRN
Qty: 40 TABLET | Refills: 0 | Status: SHIPPED | OUTPATIENT
Start: 2018-04-19 | End: 2018-04-30 | Stop reason: SDUPTHER

## 2018-04-30 ENCOUNTER — TELEPHONE (OUTPATIENT)
Dept: ORTHOPEDIC SURGERY | Age: 53
End: 2018-04-30

## 2018-04-30 DIAGNOSIS — Z96.651 STATUS POST TOTAL RIGHT KNEE REPLACEMENT: ICD-10-CM

## 2018-04-30 RX ORDER — HYDROCODONE BITARTRATE AND ACETAMINOPHEN 7.5; 325 MG/1; MG/1
1 TABLET ORAL EVERY 8 HOURS PRN
Qty: 40 TABLET | Refills: 0 | Status: SHIPPED | OUTPATIENT
Start: 2018-04-30 | End: 2018-05-09 | Stop reason: SDUPTHER

## 2018-05-01 ENCOUNTER — HOSPITAL ENCOUNTER (OUTPATIENT)
Dept: OTHER | Age: 53
Discharge: HOME | End: 2018-05-31
Attending: ORTHOPAEDIC SURGERY | Admitting: ORTHOPAEDIC SURGERY

## 2018-05-03 ENCOUNTER — TELEPHONE (OUTPATIENT)
Dept: ORTHOPEDIC SURGERY | Age: 53
End: 2018-05-03

## 2018-05-08 ENCOUNTER — TELEPHONE (OUTPATIENT)
Dept: ORTHOPEDIC SURGERY | Age: 53
End: 2018-05-08

## 2018-05-08 DIAGNOSIS — Z96.651 STATUS POST TOTAL RIGHT KNEE REPLACEMENT: ICD-10-CM

## 2018-05-09 RX ORDER — HYDROCODONE BITARTRATE AND ACETAMINOPHEN 7.5; 325 MG/1; MG/1
1 TABLET ORAL EVERY 8 HOURS PRN
Qty: 21 TABLET | Refills: 0 | Status: SHIPPED | OUTPATIENT
Start: 2018-05-09 | End: 2018-05-22 | Stop reason: SDUPTHER

## 2018-05-21 ENCOUNTER — TELEPHONE (OUTPATIENT)
Dept: ORTHOPEDIC SURGERY | Age: 53
End: 2018-05-21

## 2018-05-21 DIAGNOSIS — Z96.651 STATUS POST TOTAL RIGHT KNEE REPLACEMENT: ICD-10-CM

## 2018-05-22 RX ORDER — HYDROCODONE BITARTRATE AND ACETAMINOPHEN 7.5; 325 MG/1; MG/1
1 TABLET ORAL 2 TIMES DAILY
Qty: 14 TABLET | Refills: 0 | Status: SHIPPED | OUTPATIENT
Start: 2018-05-22 | End: 2018-06-04 | Stop reason: SDUPTHER

## 2018-05-24 ENCOUNTER — OFFICE VISIT (OUTPATIENT)
Dept: ORTHOPEDIC SURGERY | Age: 53
End: 2018-05-24

## 2018-05-24 VITALS — BODY MASS INDEX: 32.64 KG/M2 | TEMPERATURE: 97.5 F | WEIGHT: 241 LBS | RESPIRATION RATE: 16 BRPM | HEIGHT: 72 IN

## 2018-05-24 DIAGNOSIS — Z96.651 HISTORY OF TOTAL KNEE REPLACEMENT, RIGHT: Primary | ICD-10-CM

## 2018-05-24 PROCEDURE — 99024 POSTOP FOLLOW-UP VISIT: CPT | Performed by: ORTHOPAEDIC SURGERY

## 2018-05-30 ENCOUNTER — HOSPITAL ENCOUNTER (OUTPATIENT)
Dept: PHYSICAL THERAPY | Age: 53
Discharge: HOME | End: 2018-06-30
Admitting: ORTHOPAEDIC SURGERY

## 2018-05-30 NOTE — FLOWSHEET NOTE
significant patient edu this session, including quad lag, ROM, gait, new exercises. 4/9 - Patient educated on the focus of skilled physical therapy services and plan of care, including: diagnosis, prognosis, treatment goals & options. Home Exercise Program:    5/3 low load long duration knee ext stretching as carlton   4/25 - TM mush added for HEP.  4/23 - encouraged patient to use r bike in increasing 5 minute intervals until at 20 minutes, encouraged use of squats, standing SLRs, knee flex when at school. 4/18 - reviewed again with patient for specificity; clarification on RLE step up activity with L hip flexion  4/16 - added prone hang, prone quad strap stretch, step up activity with L hip flexion, gait tips. Recommended 5-10 minutes of leg/knee massage. Handout provided. 4/9 - The following exercises were performed and added to the patient's home exercise program (SLR, LAQ, QS, heel slides, heel prop, HSS). Additionally, the patient was educated on appropriate frequency, intensity and duration to perform. A detailed handout was provided to the patient. Manual Treatments:    5/30: prone knee flex with OP, Supine knee ext with OP, hawkgrip posterior, lateral gastroc x10 strokes banana & bottleopener and posterior, medial hamstring x 10 strokes banana & bottleopener and distal iliotibial x 10 strokes; STM manually posterior - 25'  5/29: Proximal tibial-fibular a/p glides. hawkgrip posterior, lateral gastroc x10 strokes banana and posterior, medial hamstring x 10 strokes banana and distal iliotibial x 10 strokes tongue depressor.  TFL release, medial patellar glide grade II followed by superior patellar glide grade II,  STM with biofreeze to ITB, lateral quad mm, scar tissue at lateral knee  5/24 tibial - femur a/p and p/a mobs, distraction, unicondylar glides, patellar glides, knee ext stretching, prone knee flex stretching, STM with biofreeze to ITB, lateral quad mm, scar tissue at lateral knee  5/10 - R knee patellar mobs, knee ext/flex PROM w/ overpressure (including DF + gastroc stretch) - 10'  5/7:  R knee patellar glides, knee ext mobs, prone quad stretches, prone knee ext stretches with hip in extension (towel roll under distal thigh) -8'  5/3: tibial - femur a/p and p/a mobs, distraction, unicondylar glides, patellar glides, knee ext stretching  4/30:  Patellar mobs, knee ext mobs and prone quad stretches - 9' 4/27- hawkgrip TJA incisions, tongue depressor - 10'  4/25 - manual extension, patellar mobs - 10'    4/23 - HSS, adductor S, R - 10'  4/20 - manual flex/ext of knee, patellar mobs - 8' 4/16 - manual knee flex/ext, patellar mobs - 25' 4/11 - brief manual PROM flex/ext - 3'  4/9 - consider manual PROM    Modalities:    5/24, 5/7, 5/3 ice bag to go  4/30:  CP to R-knee in reclined sitting x 10 min  4/23 - CP to go  4/16 - defer  4/11 - IFC 80/150 + CP, leg supported under calf - 10'  4/9 - CP on R knee during HEP education     Timed Code Treatment Minutes:  36    Total Treatment Minutes: 36    Treatment/Activity Tolerance:  [x] Patient tolerated treatment well [] Patient limited by fatigue  [] Patient limited by pain  [] Patient limited by other medical complications  [x] Other: Patient's ROM improved after manual techniques last visit, but not after today's session. The patient's strength and function is returning slowly. More specific, supervised eccentric resistance training and strengthening required for patient to reach LTGs.     Prognosis: [x] Good [] Fair  [] Poor    Patient Requires Follow-up: [x] Yes  [] No    Plan:   [x] Continue per plan of care [] Alter current plan (see comments)  [] Plan of care initiated [] Hold pending MD visit [] Discharge    Plan for Next Session:  progress knee ROM ext/flex; have patient perform [facility]plex routine fully for PT (to clarify understanding/performance/intensity) - consider addition of any HP exercise to facilitate leg strength (as

## 2018-06-01 ENCOUNTER — HOSPITAL ENCOUNTER (OUTPATIENT)
Dept: OTHER | Age: 53
Discharge: HOME | End: 2018-06-01
Attending: ORTHOPAEDIC SURGERY | Admitting: ORTHOPAEDIC SURGERY

## 2018-06-01 ENCOUNTER — TELEPHONE (OUTPATIENT)
Dept: ORTHOPEDIC SURGERY | Age: 53
End: 2018-06-01

## 2018-06-01 DIAGNOSIS — Z96.651 STATUS POST TOTAL RIGHT KNEE REPLACEMENT: ICD-10-CM

## 2018-06-04 RX ORDER — HYDROCODONE BITARTRATE AND ACETAMINOPHEN 7.5; 325 MG/1; MG/1
1 TABLET ORAL 2 TIMES DAILY
Qty: 14 TABLET | Refills: 0 | Status: SHIPPED | OUTPATIENT
Start: 2018-06-04 | End: 2018-06-11

## 2018-06-19 ENCOUNTER — TELEPHONE (OUTPATIENT)
Dept: ORTHOPEDIC SURGERY | Age: 53
End: 2018-06-19

## 2018-06-20 ENCOUNTER — SURG/PROC ORDERS (OUTPATIENT)
Dept: ORTHOPEDIC SURGERY | Age: 53
End: 2018-06-20

## 2018-07-01 ENCOUNTER — HOSPITAL ENCOUNTER (OUTPATIENT)
Dept: OTHER | Age: 53
Discharge: HOME | End: 2018-07-31
Attending: ORTHOPAEDIC SURGERY | Admitting: ORTHOPAEDIC SURGERY

## 2018-08-13 ENCOUNTER — OFFICE VISIT (OUTPATIENT)
Dept: FAMILY MEDICINE CLINIC | Age: 53
End: 2018-08-13

## 2018-08-13 VITALS
HEIGHT: 72 IN | HEART RATE: 77 BPM | OXYGEN SATURATION: 98 % | WEIGHT: 245 LBS | DIASTOLIC BLOOD PRESSURE: 80 MMHG | SYSTOLIC BLOOD PRESSURE: 120 MMHG | BODY MASS INDEX: 33.18 KG/M2

## 2018-08-13 DIAGNOSIS — E55.9 VITAMIN D DEFICIENCY: ICD-10-CM

## 2018-08-13 DIAGNOSIS — Z23 NEED FOR TDAP VACCINATION: ICD-10-CM

## 2018-08-13 DIAGNOSIS — Z00.00 PREVENTATIVE HEALTH CARE: Primary | ICD-10-CM

## 2018-08-13 PROCEDURE — 90471 IMMUNIZATION ADMIN: CPT | Performed by: PHYSICIAN ASSISTANT

## 2018-08-13 PROCEDURE — 99396 PREV VISIT EST AGE 40-64: CPT | Performed by: PHYSICIAN ASSISTANT

## 2018-08-13 PROCEDURE — 90715 TDAP VACCINE 7 YRS/> IM: CPT | Performed by: PHYSICIAN ASSISTANT

## 2018-08-13 RX ORDER — NAPROXEN SODIUM 220 MG
220 TABLET ORAL 2 TIMES DAILY WITH MEALS
COMMUNITY
End: 2018-10-04

## 2018-08-13 ASSESSMENT — ENCOUNTER SYMPTOMS
TROUBLE SWALLOWING: 0
SHORTNESS OF BREATH: 0
DIARRHEA: 0
BACK PAIN: 0
SORE THROAT: 0
ABDOMINAL PAIN: 0
COUGH: 0
EYE PAIN: 0
CHEST TIGHTNESS: 0
CONSTIPATION: 0
VOICE CHANGE: 0

## 2018-08-13 NOTE — PROGRESS NOTES
Normocephalic. Right Ear: Tympanic membrane and ear canal normal.   Left Ear: Tympanic membrane and ear canal normal.   Nose: Nose normal.   Mouth/Throat: Uvula is midline, oropharynx is clear and moist and mucous membranes are normal.   Eyes: Pupils are equal, round, and reactive to light. Conjunctivae are normal.   Neck: Neck supple. No thyromegaly present. Cardiovascular: Normal rate, regular rhythm and normal heart sounds. No murmur heard. Pulses:       Dorsalis pedis pulses are 2+ on the right side, and 2+ on the left side. Pulmonary/Chest: Effort normal and breath sounds normal. No respiratory distress. He has no decreased breath sounds. Abdominal: Soft. Normal appearance and bowel sounds are normal. He exhibits no distension and no mass. There is no hepatosplenomegaly. There is no tenderness. There is no rigidity, no rebound, no guarding and no CVA tenderness. No hernia. Musculoskeletal: Normal range of motion. He exhibits no edema. Lymphadenopathy:     He has no cervical adenopathy. Neurological: He is alert and oriented to person, place, and time. He has normal reflexes. Skin: Skin is warm, dry and intact. No rash noted. Psychiatric: He has a normal mood and affect. His speech is normal and behavior is normal. Thought content normal.       Assessment:      Spenser Perez was seen today for annual exam.    Diagnoses and all orders for this visit:    Preventative health care    Need for Tdap vaccination  -     Tdap (age 10y-63y) IM (Adacel)    Vitamin D deficiency             Plan:          Vit D was done while in hospital in March, it was low so he will take otc vit D, tdap today, not due for labs at this time, return in a year. Let me know what sleep center you can go to.      Ivette Mccormack, [address]

## 2018-09-13 ENCOUNTER — OFFICE VISIT (OUTPATIENT)
Dept: ORTHOPEDIC SURGERY | Age: 53
End: 2018-09-13

## 2018-09-13 VITALS
HEIGHT: 72 IN | HEART RATE: 81 BPM | WEIGHT: 245 LBS | TEMPERATURE: 98.4 F | SYSTOLIC BLOOD PRESSURE: 143 MMHG | DIASTOLIC BLOOD PRESSURE: 92 MMHG | BODY MASS INDEX: 33.18 KG/M2

## 2018-09-13 DIAGNOSIS — Z96.651 HISTORY OF TOTAL KNEE ARTHROPLASTY, RIGHT: Primary | ICD-10-CM

## 2018-09-13 PROCEDURE — 99213 OFFICE O/P EST LOW 20 MIN: CPT | Performed by: ORTHOPAEDIC SURGERY

## 2018-09-17 NOTE — PROGRESS NOTES
his previous ligamentous examination. No other obvious fractures tumors or dislocations are noted on these x-rays. Impression 66-year-old male about 6 months postop stable uncemented total knee arthroplasty. Plan we had a 15 minute face-to-face discussion of which greater than 50% of time was spent in counseling him about further care and treatment of his total knee arthroplasty. We recommended continued strengthening exercises of his quadriceps and stretching exercises of his hamstrings. We also went over the need for antibiotic prophylaxis around dental and/or surgical events. Finally we stressed the need especially that his young age for yearly evaluation and follow-up. We will see him back in 6-12 months or p.r.n.

## 2018-10-01 ENCOUNTER — OFFICE VISIT (OUTPATIENT)
Dept: ORTHOPEDIC SURGERY | Age: 53
End: 2018-10-01
Payer: COMMERCIAL

## 2018-10-01 VITALS
HEART RATE: 74 BPM | HEIGHT: 72 IN | WEIGHT: 245 LBS | BODY MASS INDEX: 33.18 KG/M2 | DIASTOLIC BLOOD PRESSURE: 86 MMHG | SYSTOLIC BLOOD PRESSURE: 126 MMHG

## 2018-10-01 DIAGNOSIS — M65.342 ACQUIRED TRIGGER FINGER OF LEFT RING FINGER: ICD-10-CM

## 2018-10-01 DIAGNOSIS — M65.341 ACQUIRED TRIGGER FINGER OF RIGHT RING FINGER: Primary | ICD-10-CM

## 2018-10-01 PROCEDURE — 99214 OFFICE O/P EST MOD 30 MIN: CPT | Performed by: ORTHOPAEDIC SURGERY

## 2018-10-01 PROCEDURE — 20550 NJX 1 TENDON SHEATH/LIGAMENT: CPT | Performed by: ORTHOPAEDIC SURGERY

## 2018-10-01 RX ORDER — BETAMETHASONE SODIUM PHOSPHATE AND BETAMETHASONE ACETATE 3; 3 MG/ML; MG/ML
3 INJECTION, SUSPENSION INTRA-ARTICULAR; INTRALESIONAL; INTRAMUSCULAR; SOFT TISSUE ONCE
Status: COMPLETED | OUTPATIENT
Start: 2018-10-01 | End: 2018-10-01

## 2018-10-01 RX ADMIN — BETAMETHASONE SODIUM PHOSPHATE AND BETAMETHASONE ACETATE 3 MG: 3; 3 INJECTION, SUSPENSION INTRA-ARTICULAR; INTRALESIONAL; INTRAMUSCULAR; SOFT TISSUE at 08:56

## 2018-10-01 NOTE — PROGRESS NOTES
normal in the Median Innervated Digits and Ulnar Innervated Digits bilaterally  Vascular examination reveals normal, good capillary refill, good color, radial pulse present and warm bilaterally  Swelling is minimal in the symptomatic digit(s), absent elsewhere bilaterally  There is no evidence of gross joint instability bilaterally. Muscular strength is clinically appropriate bilaterally. Examination for Stenosing Tenosynovitis demonstrates moderate tenderness, thickening & nodularity at the A-1 pulley(s) of the Bilateral Ring Finger. There is a palpable Nota's Node on the left. There is triggering on active flexion with pain. No other digits demonstrate evidence of Stenosing Tenosynovitis. Examination of the Carpo-Metacarpal Joint, Metacarpo-Phalangeal Joint, Proximal Interphalangeal Joint, Distal InterPhalangeal Joint and InterPhalangeal Joints of the Whole Hand demonstrate no swelling, no effusion, and there is no crepitance with range of motion. Impression:  Mr. Wylie Seip is showing clinical evidence of clinically obvious Bilateral Ring Finger Trigger Finger which is not responding to current treatment and presents requesting further treatment. Plan: We reviewed the treatment options for the severity of his symptoms. He has active locking of his left ring finger and given his job as a  this is not likely to resolve with conservative efforts. His right ring finger triggering his inducible but mild to moderate. We discussed the option of cortisone injection to try to resolve this today. He is agreement with the injection on the right hand after reviewing the risks and benefits. The right ring finger over the A1 pulley after verifying correct site was prepped with Betadine. Using a 25-gauge needle 1/2 mL of betamethasone mixed with 1/2 mL 1% lidocaine plain were carefully aspirated and infiltrated and the tendon sheath under aseptic technique.   He tolerated

## 2018-10-01 NOTE — LETTER
[ ] Medical/Cardiac Clearance by _____________________________  [ ] Joint Replacement Class  [ ] Physical Therapy  [ ] Crutch Walking Instructions   Weight Bearing Status _____________  [ ] Occupational Therapy  [ ] Smoking Cessation Instructions  [ ] Other ________________________________________________    Pre Admission Testing:  [ ] Hemoglobin & Hematocrit   [ ] Comprehensive Metabolic Panel  [ ] CBC with differential            [ ] Type & Screen  [ ] CBC without differential       [ ] Type & Crossmatch _____ units  [ ] PT/INR                                   [ ] Autologous Blood _____ units  [ ] PTT                                        [ ]  EKG  [ ] Urinalysis                               Jose ]  Other Anesthesia guidelines  [ ] Urinalysis with C & S  [ ] Basic Metabolic Panel         [ ] MRSA-nasal    Orders to be initiated upon admission to same day surgery:  Jose ] Fasting blood sugar by fingerstick [ ] Radonna Going  [ ] PT/INR (pt takes Warafin/Coumadin)     [ ] Interscalene Right or Left  X[ ] HCG __X__ Urine _____ Serum              [ ] Femoral Right or Left  [ ] Other ______________________________________________    IV Fluids and Medications:  1. Place IV catherer for IV access. The RN may use 0.1ml of 1% Lidocaine SQ      Per site for IV starts up to maximum of 0.5ml.  2. Infuse Lactated Ringers IV fluid at 50ml per hour. For diabetic or has renal             impaired patient, infuse 0.9% Normal Saline at 50ml/hr. 3. Cefazolin 1g IV if <80kg OR 2g if 80-120kg OR 3g if >120kg, given within one     hour of incision time. For those allergic to Cephalosporins, give Clindamycin     600mg IV within 1 hour of incision time.    4. Other medications: _________________________________________    Additional Orders:  Jes.Slates ] Knee high anti-embolism stockings and antithrombic compression pumps (apply in Pre-op)      Physican Signature:

## 2018-10-02 ENCOUNTER — TELEPHONE (OUTPATIENT)
Dept: ORTHOPEDIC SURGERY | Age: 53
End: 2018-10-02

## 2018-10-04 ENCOUNTER — TELEPHONE (OUTPATIENT)
Dept: FAMILY MEDICINE CLINIC | Age: 53
End: 2018-10-04

## 2018-10-04 ENCOUNTER — OFFICE VISIT (OUTPATIENT)
Dept: FAMILY MEDICINE CLINIC | Age: 53
End: 2018-10-04
Payer: COMMERCIAL

## 2018-10-04 VITALS
HEART RATE: 77 BPM | WEIGHT: 250 LBS | TEMPERATURE: 97.8 F | OXYGEN SATURATION: 98 % | SYSTOLIC BLOOD PRESSURE: 136 MMHG | BODY MASS INDEX: 33.86 KG/M2 | HEIGHT: 72 IN | DIASTOLIC BLOOD PRESSURE: 70 MMHG

## 2018-10-04 DIAGNOSIS — Z01.818 PREOP EXAMINATION: Primary | ICD-10-CM

## 2018-10-04 DIAGNOSIS — M65.30 TRIGGER FINGER OF LEFT HAND, UNSPECIFIED FINGER: ICD-10-CM

## 2018-10-04 DIAGNOSIS — Z23 NEED FOR IMMUNIZATION AGAINST INFLUENZA: ICD-10-CM

## 2018-10-04 PROCEDURE — 90682 RIV4 VACC RECOMBINANT DNA IM: CPT | Performed by: PHYSICIAN ASSISTANT

## 2018-10-04 PROCEDURE — 90471 IMMUNIZATION ADMIN: CPT | Performed by: PHYSICIAN ASSISTANT

## 2018-10-04 PROCEDURE — 99242 OFF/OP CONSLTJ NEW/EST SF 20: CPT | Performed by: PHYSICIAN ASSISTANT

## 2018-10-04 ASSESSMENT — PATIENT HEALTH QUESTIONNAIRE - PHQ9
SUM OF ALL RESPONSES TO PHQ QUESTIONS 1-9: 0
SUM OF ALL RESPONSES TO PHQ9 QUESTIONS 1 & 2: 0
2. FEELING DOWN, DEPRESSED OR HOPELESS: 0
1. LITTLE INTEREST OR PLEASURE IN DOING THINGS: 0
SUM OF ALL RESPONSES TO PHQ QUESTIONS 1-9: 0

## 2018-10-04 NOTE — TELEPHONE ENCOUNTER
Pt was seen by Matt Fay today and received flu shot. He needs expiration date for his employer. Pt needs the expiration date sent on letter head. Please make sure his name, date of flu shot, and expiration date are all on correspondence. P: [id number]    F: [phone]    Please advise pt when sent.

## 2018-10-16 RX ORDER — TRAMADOL HYDROCHLORIDE 50 MG/1
50 TABLET ORAL EVERY 6 HOURS PRN
Status: ON HOLD | COMMUNITY
End: 2018-10-18

## 2018-10-18 ENCOUNTER — ANESTHESIA (OUTPATIENT)
Dept: OPERATING ROOM | Age: 53
End: 2018-10-18
Payer: COMMERCIAL

## 2018-10-18 ENCOUNTER — ANESTHESIA EVENT (OUTPATIENT)
Dept: OPERATING ROOM | Age: 53
End: 2018-10-18
Payer: COMMERCIAL

## 2018-10-18 ENCOUNTER — HOSPITAL ENCOUNTER (OUTPATIENT)
Age: 53
Setting detail: OUTPATIENT SURGERY
Discharge: HOME | End: 2018-10-18
Attending: ORTHOPAEDIC SURGERY | Admitting: ORTHOPAEDIC SURGERY
Payer: COMMERCIAL

## 2018-10-18 VITALS — DIASTOLIC BLOOD PRESSURE: 66 MMHG | SYSTOLIC BLOOD PRESSURE: 113 MMHG | OXYGEN SATURATION: 98 %

## 2018-10-18 VITALS
BODY MASS INDEX: 33.21 KG/M2 | DIASTOLIC BLOOD PRESSURE: 83 MMHG | TEMPERATURE: 98.4 F | RESPIRATION RATE: 16 BRPM | OXYGEN SATURATION: 98 % | HEIGHT: 72 IN | SYSTOLIC BLOOD PRESSURE: 127 MMHG | WEIGHT: 245.2 LBS | HEART RATE: 71 BPM

## 2018-10-18 DIAGNOSIS — M65.342 TRIGGER RING FINGER OF LEFT HAND: Primary | ICD-10-CM

## 2018-10-18 PROCEDURE — 2580000003 HC RX 258: Performed by: ORTHOPAEDIC SURGERY

## 2018-10-18 PROCEDURE — 2500000003 HC RX 250 WO HCPCS: Performed by: ORTHOPAEDIC SURGERY

## 2018-10-18 PROCEDURE — 2709999900 HC NON-CHARGEABLE SUPPLY: Performed by: ORTHOPAEDIC SURGERY

## 2018-10-18 PROCEDURE — 7100000011 HC PHASE II RECOVERY - ADDTL 15 MIN: Performed by: ORTHOPAEDIC SURGERY

## 2018-10-18 PROCEDURE — 7100000000 HC PACU RECOVERY - FIRST 15 MIN: Performed by: ORTHOPAEDIC SURGERY

## 2018-10-18 PROCEDURE — 6370000000 HC RX 637 (ALT 250 FOR IP): Performed by: ANESTHESIOLOGY

## 2018-10-18 PROCEDURE — 7100000001 HC PACU RECOVERY - ADDTL 15 MIN: Performed by: ORTHOPAEDIC SURGERY

## 2018-10-18 PROCEDURE — 7100000010 HC PHASE II RECOVERY - FIRST 15 MIN: Performed by: ORTHOPAEDIC SURGERY

## 2018-10-18 PROCEDURE — 3700000000 HC ANESTHESIA ATTENDED CARE: Performed by: ORTHOPAEDIC SURGERY

## 2018-10-18 PROCEDURE — 3700000001 HC ADD 15 MINUTES (ANESTHESIA): Performed by: ORTHOPAEDIC SURGERY

## 2018-10-18 PROCEDURE — 3600000013 HC SURGERY LEVEL 3 ADDTL 15MIN: Performed by: ORTHOPAEDIC SURGERY

## 2018-10-18 PROCEDURE — 3600000003 HC SURGERY LEVEL 3 BASE: Performed by: ORTHOPAEDIC SURGERY

## 2018-10-18 PROCEDURE — 6360000002 HC RX W HCPCS: Performed by: ORTHOPAEDIC SURGERY

## 2018-10-18 PROCEDURE — 6360000002 HC RX W HCPCS: Performed by: NURSE ANESTHETIST, CERTIFIED REGISTERED

## 2018-10-18 PROCEDURE — 2500000003 HC RX 250 WO HCPCS: Performed by: NURSE ANESTHETIST, CERTIFIED REGISTERED

## 2018-10-18 RX ORDER — MEPERIDINE HYDROCHLORIDE 25 MG/ML
12.5 INJECTION INTRAMUSCULAR; INTRAVENOUS; SUBCUTANEOUS EVERY 5 MIN PRN
Status: DISCONTINUED | OUTPATIENT
Start: 2018-10-18 | End: 2018-10-18 | Stop reason: HOSPADM

## 2018-10-18 RX ORDER — LIDOCAINE HYDROCHLORIDE 20 MG/ML
INJECTION, SOLUTION INFILTRATION; PERINEURAL PRN
Status: DISCONTINUED | OUTPATIENT
Start: 2018-10-18 | End: 2018-10-18 | Stop reason: SDUPTHER

## 2018-10-18 RX ORDER — SODIUM CHLORIDE 0.9 % (FLUSH) 0.9 %
10 SYRINGE (ML) INJECTION EVERY 12 HOURS SCHEDULED
Status: DISCONTINUED | OUTPATIENT
Start: 2018-10-18 | End: 2018-10-18 | Stop reason: HOSPADM

## 2018-10-18 RX ORDER — LIDOCAINE HYDROCHLORIDE 10 MG/ML
1 INJECTION, SOLUTION EPIDURAL; INFILTRATION; INTRACAUDAL; PERINEURAL
Status: DISCONTINUED | OUTPATIENT
Start: 2018-10-18 | End: 2018-10-18 | Stop reason: HOSPADM

## 2018-10-18 RX ORDER — ONDANSETRON 2 MG/ML
4 INJECTION INTRAMUSCULAR; INTRAVENOUS
Status: DISCONTINUED | OUTPATIENT
Start: 2018-10-18 | End: 2018-10-18 | Stop reason: HOSPADM

## 2018-10-18 RX ORDER — HYDRALAZINE HYDROCHLORIDE 20 MG/ML
5 INJECTION INTRAMUSCULAR; INTRAVENOUS EVERY 10 MIN PRN
Status: DISCONTINUED | OUTPATIENT
Start: 2018-10-18 | End: 2018-10-18 | Stop reason: HOSPADM

## 2018-10-18 RX ORDER — LIDOCAINE HYDROCHLORIDE 10 MG/ML
INJECTION, SOLUTION INFILTRATION; PERINEURAL
Status: COMPLETED | OUTPATIENT
Start: 2018-10-18 | End: 2018-10-18

## 2018-10-18 RX ORDER — TRAMADOL HYDROCHLORIDE 50 MG/1
50 TABLET ORAL EVERY 6 HOURS PRN
Qty: 12 TABLET | Refills: 0 | Status: SHIPPED | OUTPATIENT
Start: 2018-10-18 | End: 2018-10-21

## 2018-10-18 RX ORDER — OXYCODONE HYDROCHLORIDE AND ACETAMINOPHEN 5; 325 MG/1; MG/1
1 TABLET ORAL
Status: COMPLETED | OUTPATIENT
Start: 2018-10-18 | End: 2018-10-18

## 2018-10-18 RX ORDER — HYDROMORPHONE HCL 110MG/55ML
0.5 PATIENT CONTROLLED ANALGESIA SYRINGE INTRAVENOUS EVERY 5 MIN PRN
Status: DISCONTINUED | OUTPATIENT
Start: 2018-10-18 | End: 2018-10-18 | Stop reason: HOSPADM

## 2018-10-18 RX ORDER — CEFAZOLIN SODIUM 2 G/100ML
2 INJECTION, SOLUTION INTRAVENOUS
Status: COMPLETED | OUTPATIENT
Start: 2018-10-18 | End: 2018-10-18

## 2018-10-18 RX ORDER — SODIUM CHLORIDE, SODIUM LACTATE, POTASSIUM CHLORIDE, CALCIUM CHLORIDE 600; 310; 30; 20 MG/100ML; MG/100ML; MG/100ML; MG/100ML
INJECTION, SOLUTION INTRAVENOUS CONTINUOUS
Status: DISCONTINUED | OUTPATIENT
Start: 2018-10-18 | End: 2018-10-18 | Stop reason: HOSPADM

## 2018-10-18 RX ORDER — FENTANYL CITRATE 50 UG/ML
INJECTION, SOLUTION INTRAMUSCULAR; INTRAVENOUS PRN
Status: DISCONTINUED | OUTPATIENT
Start: 2018-10-18 | End: 2018-10-18 | Stop reason: SDUPTHER

## 2018-10-18 RX ORDER — PROPOFOL 10 MG/ML
INJECTION, EMULSION INTRAVENOUS CONTINUOUS PRN
Status: DISCONTINUED | OUTPATIENT
Start: 2018-10-18 | End: 2018-10-18 | Stop reason: SDUPTHER

## 2018-10-18 RX ORDER — LABETALOL HYDROCHLORIDE 5 MG/ML
5 INJECTION, SOLUTION INTRAVENOUS EVERY 10 MIN PRN
Status: DISCONTINUED | OUTPATIENT
Start: 2018-10-18 | End: 2018-10-18 | Stop reason: HOSPADM

## 2018-10-18 RX ORDER — SODIUM CHLORIDE 0.9 % (FLUSH) 0.9 %
10 SYRINGE (ML) INJECTION PRN
Status: DISCONTINUED | OUTPATIENT
Start: 2018-10-18 | End: 2018-10-18 | Stop reason: HOSPADM

## 2018-10-18 RX ORDER — LIDOCAINE HYDROCHLORIDE 10 MG/ML
0.5 INJECTION, SOLUTION EPIDURAL; INFILTRATION; INTRACAUDAL; PERINEURAL ONCE
Status: DISCONTINUED | OUTPATIENT
Start: 2018-10-18 | End: 2018-10-18 | Stop reason: HOSPADM

## 2018-10-18 RX ORDER — BUPIVACAINE HYDROCHLORIDE 5 MG/ML
INJECTION, SOLUTION PERINEURAL
Status: COMPLETED | OUTPATIENT
Start: 2018-10-18 | End: 2018-10-18

## 2018-10-18 RX ADMIN — PROPOFOL 140 MCG/KG/MIN: 10 INJECTION, EMULSION INTRAVENOUS at 07:29

## 2018-10-18 RX ADMIN — OXYCODONE HYDROCHLORIDE AND ACETAMINOPHEN 1 TABLET: 5; 325 TABLET ORAL at 08:22

## 2018-10-18 RX ADMIN — SODIUM CHLORIDE, POTASSIUM CHLORIDE, SODIUM LACTATE AND CALCIUM CHLORIDE: 600; 310; 30; 20 INJECTION, SOLUTION INTRAVENOUS at 06:50

## 2018-10-18 RX ADMIN — CEFAZOLIN SODIUM 2 G: 2 INJECTION, SOLUTION INTRAVENOUS at 07:22

## 2018-10-18 RX ADMIN — FENTANYL CITRATE 50 MCG: 50 INJECTION, SOLUTION INTRAMUSCULAR; INTRAVENOUS at 07:29

## 2018-10-18 RX ADMIN — LIDOCAINE HYDROCHLORIDE 100 MG: 20 INJECTION, SOLUTION INFILTRATION; PERINEURAL at 07:29

## 2018-10-18 ASSESSMENT — PULMONARY FUNCTION TESTS
PIF_VALUE: 0

## 2018-10-18 ASSESSMENT — PAIN DESCRIPTION - PAIN TYPE: TYPE: SURGICAL PAIN

## 2018-10-18 ASSESSMENT — PAIN DESCRIPTION - DESCRIPTORS: DESCRIPTORS: ACHING

## 2018-10-18 ASSESSMENT — PAIN SCALES - GENERAL: PAINLEVEL_OUTOF10: 5

## 2018-10-18 ASSESSMENT — PAIN - FUNCTIONAL ASSESSMENT: PAIN_FUNCTIONAL_ASSESSMENT: 0-10

## 2018-10-18 NOTE — PROGRESS NOTES
Pain pill given for mild surgical pain.
Teaching/ education completed for home care including pain management, wound care,activity,safety precautions and infection control. Patient and spouse verbalized understanding.
body piercing jewelry must be removed. 11. If you have ___dentures, they will be removed before going to the OR; we will provide you a container. If you wear ___contact lenses or ___glasses, they will be removed; please bring a case for them. 12. Please see your family doctor/pediatrician for a history & physical and/or concerning medications. Bring any test results/reports from your physician's office. PCP______johnson____________Phone___________H&P Appt. Date________             13 If you  have a Living Will and Durable Power of  for Healthcare, please bring in a copy. 15. Notify your Surgeon if you develop any illness between now and surgery  time, cough, cold, fever, sore throat, nausea, vomiting, etc.  Please notify your surgeon if you experience dizziness, shortness of breath or blurred vision between now & the time of your surgery             15. DO NOT shave your operative site 96 hours prior to surgery. For face & neck surgery, men may use an electric razor 48 hours prior to surgery. 16. Shower the night before surgery with _x__Antibacterial soap ___Hibiclens             17. To provide excellent care visitors will be limited to one in the room at any given time. 18.  Please bring picture ID and insurance card. 19.  Visit our web site for additional information:  [email or web address]              20.During flu season no children under the age of 15 are permitted in the hospital for the safety of all patients. 21. If you take a long acting insulin in the evening only  take half of your usual  dose the night  before your procedure              22. If you use a c-pap please bring DOS if staying overnight,             23.For your convenience [address] [facility] has a pharmacy on site to fill your prescriptions.              24. If you use oxygen and have a portable tank please bring it  with you the DOS

## 2018-10-26 ENCOUNTER — OFFICE VISIT (OUTPATIENT)
Dept: ORTHOPEDIC SURGERY | Age: 53
End: 2018-10-26

## 2018-10-26 VITALS
DIASTOLIC BLOOD PRESSURE: 73 MMHG | HEART RATE: 80 BPM | SYSTOLIC BLOOD PRESSURE: 134 MMHG | WEIGHT: 245 LBS | BODY MASS INDEX: 33.18 KG/M2 | HEIGHT: 72 IN

## 2018-10-26 DIAGNOSIS — M65.342 ACQUIRED TRIGGER FINGER OF LEFT RING FINGER: Primary | ICD-10-CM

## 2018-10-26 PROCEDURE — 99024 POSTOP FOLLOW-UP VISIT: CPT | Performed by: ORTHOPAEDIC SURGERY

## 2018-10-26 NOTE — LETTER
[facility] [address] [address]  Phone: [phone]  Fax: [phone]    [place], [place]        October 26, 2018       Patient: Castillo Waterman   MR Number: [id number]   YOB: 1965   Date of Visit: 10/26/2018       Dear Dr. Pillo Gomes:    Thank you for the request for consultation for Maricarmen Ellis to me for the evaluation of Left ring finger trigger. Below are the relevant portions of my assessment and plan of care. If you have questions, please do not hesitate to call me. I look forward to following Sofie Bunch along with you.     Sincerely,        Marily Molina MD    CC providers:  [facility] [address] [place] [address] [address] [facility]

## 2019-03-28 ENCOUNTER — OFFICE VISIT (OUTPATIENT)
Dept: ORTHOPEDIC SURGERY | Age: 54
End: 2019-03-28
Payer: COMMERCIAL

## 2019-03-28 VITALS
WEIGHT: 254 LBS | SYSTOLIC BLOOD PRESSURE: 135 MMHG | DIASTOLIC BLOOD PRESSURE: 86 MMHG | BODY MASS INDEX: 34.4 KG/M2 | TEMPERATURE: 97 F | HEIGHT: 72 IN | HEART RATE: 82 BPM

## 2019-03-28 DIAGNOSIS — Z96.651 HISTORY OF TOTAL KNEE ARTHROPLASTY, RIGHT: Primary | ICD-10-CM

## 2019-03-28 PROCEDURE — 99213 OFFICE O/P EST LOW 20 MIN: CPT | Performed by: PHYSICIAN ASSISTANT

## 2019-03-28 RX ORDER — CEPHALEXIN 500 MG/1
CAPSULE ORAL
Qty: 8 CAPSULE | Refills: 1 | Status: ON HOLD | OUTPATIENT
Start: 2019-03-28 | End: 2019-04-22 | Stop reason: HOSPADM

## 2019-03-28 NOTE — PROGRESS NOTES
This dictation was done with [employer] dictation and may contain mechanical errors related to translation. Blood pressure 135/86, pulse 82, temperature 97 °F (36.1 °C), height 6' (1.829 m), weight 254 lb (115.2 kg). This is a pleasant 49-year-old gentleman whose here in follow-up for his right knee. Surgery on 3/20/2018 she is a year out he still gets occasional pain 2 out of 10 pain in the right knee and this seems to be more aware the bursitis is he's been walking and been more physical the left knee has osteoarthritis but he states that it's been fairly well controlled with the previous injections exercising and he actually takes tramadol another physician. He was sent for x-rays including a standing AP lateral and a sunrise view of his right knee. Xray three views of the total knee arthroplasty reveals satisfactory alignment of the prosthesis . No signs of significant polyethylene wear or failure. No progressive radiolucencies,fractures, tumors or dislocations. His exam is consistent with 0-132° of motion no varus or valgus laxity good quad tone good distal pulses good dorsiflexion and plantarflexion strength. He has good symmetric motion through the hips and walks without antalgia. Impression is stable right total knee replacement.     We talked about the osteoarthritis in the left knee the use of NSAIDs versus narcotics continued prophylactic antibiotics and the need to follow-up on a yearly basis for x-rays

## 2019-04-20 ENCOUNTER — APPOINTMENT (OUTPATIENT)
Dept: CT IMAGING | Age: 54
End: 2019-04-20
Payer: COMMERCIAL

## 2019-04-20 ENCOUNTER — HOSPITAL ENCOUNTER (OUTPATIENT)
Age: 54
Setting detail: OBSERVATION
Discharge: HOME | End: 2019-04-22
Attending: EMERGENCY MEDICINE | Admitting: FAMILY MEDICINE
Payer: COMMERCIAL

## 2019-04-20 DIAGNOSIS — R29.810 FACIAL DROOP: Primary | ICD-10-CM

## 2019-04-20 LAB
A/G RATIO: 1.1 (ref 1.1–2.2)
ALBUMIN SERPL-MCNC: 3.9 G/DL (ref 3.4–5)
ALP BLD-CCNC: 119 U/L (ref 40–129)
ALT SERPL-CCNC: 25 U/L (ref 10–40)
ANION GAP SERPL CALCULATED.3IONS-SCNC: 11 MMOL/L (ref 3–16)
AST SERPL-CCNC: 23 U/L (ref 15–37)
BASOPHILS ABSOLUTE: 0 K/UL (ref 0–0.2)
BASOPHILS RELATIVE PERCENT: 0.3 %
BILIRUB SERPL-MCNC: 0.4 MG/DL (ref 0–1)
BUN BLDV-MCNC: 13 MG/DL (ref 7–20)
CALCIUM SERPL-MCNC: 9.1 MG/DL (ref 8.3–10.6)
CHLORIDE BLD-SCNC: 104 MMOL/L (ref 99–110)
CO2: 22 MMOL/L (ref 21–32)
CREAT SERPL-MCNC: 1 MG/DL (ref 0.9–1.3)
EOSINOPHILS ABSOLUTE: 0.1 K/UL (ref 0–0.6)
EOSINOPHILS RELATIVE PERCENT: 1 %
GFR AFRICAN AMERICAN: >60
GFR NON-AFRICAN AMERICAN: >60
GLOBULIN: 3.5 G/DL
GLUCOSE BLD-MCNC: 124 MG/DL (ref 70–99)
HCT VFR BLD CALC: 48.1 % (ref 40.5–52.5)
HEMOGLOBIN: 16.4 G/DL (ref 13.5–17.5)
INR BLD: 1.14 (ref 0.86–1.14)
LYMPHOCYTES ABSOLUTE: 1.8 K/UL (ref 1–5.1)
LYMPHOCYTES RELATIVE PERCENT: 17.8 %
MCH RBC QN AUTO: 30 PG (ref 26–34)
MCHC RBC AUTO-ENTMCNC: 34.1 G/DL (ref 31–36)
MCV RBC AUTO: 87.8 FL (ref 80–100)
MONOCYTES ABSOLUTE: 0.8 K/UL (ref 0–1.3)
MONOCYTES RELATIVE PERCENT: 8.1 %
NEUTROPHILS ABSOLUTE: 7.3 K/UL (ref 1.7–7.7)
NEUTROPHILS RELATIVE PERCENT: 72.8 %
PDW BLD-RTO: 13.8 % (ref 12.4–15.4)
PLATELET # BLD: 286 K/UL (ref 135–450)
PMV BLD AUTO: 7.8 FL (ref 5–10.5)
POTASSIUM SERPL-SCNC: 4.1 MMOL/L (ref 3.5–5.1)
PROTHROMBIN TIME: 13 SEC (ref 9.8–13)
RBC # BLD: 5.48 M/UL (ref 4.2–5.9)
SODIUM BLD-SCNC: 137 MMOL/L (ref 136–145)
TOTAL PROTEIN: 7.4 G/DL (ref 6.4–8.2)
TROPONIN: <0.01 NG/ML
TROPONIN: <0.01 NG/ML
WBC # BLD: 10 K/UL (ref 4–11)

## 2019-04-20 PROCEDURE — 6360000002 HC RX W HCPCS: Performed by: FAMILY MEDICINE

## 2019-04-20 PROCEDURE — 2060000000 HC ICU INTERMEDIATE R&B

## 2019-04-20 PROCEDURE — 36415 COLL VENOUS BLD VENIPUNCTURE: CPT

## 2019-04-20 PROCEDURE — 96372 THER/PROPH/DIAG INJ SC/IM: CPT

## 2019-04-20 PROCEDURE — 85025 COMPLETE CBC W/AUTO DIFF WBC: CPT

## 2019-04-20 PROCEDURE — 93005 ELECTROCARDIOGRAM TRACING: CPT | Performed by: EMERGENCY MEDICINE

## 2019-04-20 PROCEDURE — 70450 CT HEAD/BRAIN W/O DYE: CPT

## 2019-04-20 PROCEDURE — G0378 HOSPITAL OBSERVATION PER HR: HCPCS

## 2019-04-20 PROCEDURE — 84484 ASSAY OF TROPONIN QUANT: CPT

## 2019-04-20 PROCEDURE — 6370000000 HC RX 637 (ALT 250 FOR IP): Performed by: FAMILY MEDICINE

## 2019-04-20 PROCEDURE — 85610 PROTHROMBIN TIME: CPT

## 2019-04-20 PROCEDURE — 99285 EMERGENCY DEPT VISIT HI MDM: CPT

## 2019-04-20 PROCEDURE — 2580000003 HC RX 258: Performed by: FAMILY MEDICINE

## 2019-04-20 PROCEDURE — 80053 COMPREHEN METABOLIC PANEL: CPT

## 2019-04-20 RX ORDER — ASPIRIN 81 MG/1
81 TABLET ORAL DAILY
Status: DISCONTINUED | OUTPATIENT
Start: 2019-04-21 | End: 2019-04-22 | Stop reason: HOSPADM

## 2019-04-20 RX ORDER — SODIUM CHLORIDE 0.9 % (FLUSH) 0.9 %
10 SYRINGE (ML) INJECTION EVERY 12 HOURS SCHEDULED
Status: DISCONTINUED | OUTPATIENT
Start: 2019-04-20 | End: 2019-04-22 | Stop reason: HOSPADM

## 2019-04-20 RX ORDER — ONDANSETRON 2 MG/ML
4 INJECTION INTRAMUSCULAR; INTRAVENOUS EVERY 6 HOURS PRN
Status: DISCONTINUED | OUTPATIENT
Start: 2019-04-20 | End: 2019-04-22 | Stop reason: HOSPADM

## 2019-04-20 RX ORDER — DEXAMETHASONE 4 MG/1
4 TABLET ORAL EVERY 12 HOURS SCHEDULED
Status: DISCONTINUED | OUTPATIENT
Start: 2019-04-21 | End: 2019-04-21

## 2019-04-20 RX ORDER — PANTOPRAZOLE SODIUM 40 MG/1
40 TABLET, DELAYED RELEASE ORAL
Status: DISCONTINUED | OUTPATIENT
Start: 2019-04-21 | End: 2019-04-22 | Stop reason: HOSPADM

## 2019-04-20 RX ORDER — ATORVASTATIN CALCIUM 40 MG/1
40 TABLET, FILM COATED ORAL NIGHTLY
Status: DISCONTINUED | OUTPATIENT
Start: 2019-04-20 | End: 2019-04-22 | Stop reason: HOSPADM

## 2019-04-20 RX ORDER — SODIUM CHLORIDE 0.9 % (FLUSH) 0.9 %
10 SYRINGE (ML) INJECTION PRN
Status: DISCONTINUED | OUTPATIENT
Start: 2019-04-20 | End: 2019-04-22 | Stop reason: HOSPADM

## 2019-04-20 RX ADMIN — ENOXAPARIN SODIUM 40 MG: 40 INJECTION SUBCUTANEOUS at 22:00

## 2019-04-20 RX ADMIN — Medication 10 ML: at 22:01

## 2019-04-20 RX ADMIN — DESMOPRESSIN ACETATE 40 MG: 0.2 TABLET ORAL at 22:01

## 2019-04-20 NOTE — ED NOTES
Pt alert and oriented x4. Pt states he woke up with facial droop to the right side. Pt denies any weakness or numbness in extremities or face. Pt also denying any visual loss or blurriness. Dr. Nora Dockery at bedside to assess pt upon pt arrival to ED room. Pt states facial droop was not present when he went to bed last night. NIH assessed. Pt denies any difficulty swallowing or speaking. Pt with VSS. IV established. Blood collected and sent to lab. Fall precautions in place. No \"stroke alert\" called at this time. Fall precautions in place. Will continue to monitor.       Jaziel Gibson RN  04/20/19 [id number]

## 2019-04-20 NOTE — ED PROVIDER NOTES
Triage Chief Complaint:   Facial Droop (woke up at 9 am and then realized he couldnt chew or close right eye)      HPI:  Kerry Daly is a 52 y.o. male that presents complaining of \"I think I had a stroke or mini stroke\". Patient states he woke up at 9 AM this morning. When he tried to eat breakfast he noticed he couldn't fully close the right side of his mouth or chew normally and was drooling. Throughout the day he also noticed that he couldn't close his right eye and has been having tearing of the right eye. Has noticed some sensation changes to the right side of the face. Has not noticed arm or leg weakness or numbness. No difficulty with balance. No vision changes. States only difficulty with speech difficulty is not able to fully close his mouth to perform certain sounds. States had viral illness a couple weeks ago with cough, congestion vomiting and diarrhea. No known tick bite. No modifying factors. No other associated symptoms and denies headache, change in hearing, chest pain, palpitations or other concerns.     Past Medical History:   Diagnosis Date    Acid reflux     Anesthesia complication     L1-BTNQ up fighting and severe cough-broke several blood vessels in face    Arthritis     Good's esophagus     Sleep apnea     mild case, no c-pap     Past Surgical History:   Procedure Laterality Date    ANTERIOR CRUCIATE LIGAMENT REPAIR  1991    CARPAL TUNNEL RELEASE Left 10/07/2014    CARPAL TUNNEL RELEASE Right 11/25/14    COLONOSCOPY      age 34    ELBOW SURGERY      JOINT REPLACEMENT      right, 3/18    KNEE ARTHROSCOPY Bilateral     SYNOVECTOMY, AND CHONDROPLASTY LEFT KNEE    OTHER SURGICAL HISTORY      wakes up fighting  and severe cough    OTHER SURGICAL HISTORY      acl removed    NC INCISE FINGER TENDON SHEATH Left 10/18/2018    LEFT RING FINGER TRIGGER RELEASE [facility]    ulnar nerve decompression at elbow    ULNAR TUNNEL RELEASE Right 29/92/33    UMBILICAL HERNIA REPAIR  6/25/15    UPPER GASTROINTESTINAL ENDOSCOPY      UPPER GASTROINTESTINAL ENDOSCOPY  11/16/2017    Dr Bjorn Llanos     Family History   Problem Relation Age of Onset    Cancer Mother         breast    Cancer Father         prostate    Heart Disease Paternal Aunt      Social History     Socioeconomic History    Marital status:      Spouse name: Not on file    Number of children: Not on file    Years of education: Not on file    Highest education level: Not on file   Occupational History    Not on file   Social Needs    Financial resource strain: Not on file    Food insecurity:     Worry: Not on file     Inability: Not on file    Transportation needs:     Medical: Not on file     Non-medical: Not on file   Tobacco Use    Smoking status: Never Smoker    Smokeless tobacco: Never Used   Substance and Sexual Activity    Alcohol use: Yes     Alcohol/week: 0.0 oz     Comment: socially    Drug use: No    Sexual activity: Yes     Partners: Female   Lifestyle    Physical activity:     Days per week: Not on file     Minutes per session: Not on file    Stress: Not on file   Relationships    Social connections:     Talks on phone: Not on file     Gets together: Not on file     Attends [employer] service: Not on file     Active member of club or organization: Not on file     Attends meetings of clubs or organizations: Not on file     Relationship status: Not on file    Intimate partner violence:     Fear of current or ex partner: Not on file     Emotionally abused: Not on file     Physically abused: Not on file     Forced sexual activity: Not on file   Other Topics Concern    Not on file   Social History Narrative    Not on file     No current facility-administered medications for this encounter.       Current Outpatient Medications   Medication Sig Dispense Refill    Naproxen Sodium (ALEVE PO) Take by mouth      cephALEXin (KEFLEX) 500 MG capsule 2 tablets by mouth 1 hour before and 1 hour after procedure 8 capsule 1    diclofenac sodium (VOLTAREN) 1 % GEL Apply 4 g topically 4 times daily 5 Tube 0    diclofenac sodium 1 % GEL APPLY FOUR GRAMS TO AFFECTED AREA(S) FOUR TIMES DAILY 4 Tube 2    pantoprazole (PROTONIX) 40 MG tablet Take 40 mg by mouth daily       Allergies   Allergen Reactions    Aspirin-Acetaminophen-Caffeine Rash     Pt states can take Aspirin, tylenol and caffeine separately but not combined      Excedrin Extra Strength [Aspirin-Acetaminophen-Caffeine] Rash    Other Rash     Red dye       Nursing Notes Reviewed    ROS:  General: no fevers/chills  HEENT: no congestion, no sore throat, no ear pain  Eyes: no discharge, no vision changes  Cardiac: no chest pain, no palpitations  Pulmonary: no SOB, no cough  GI: no N/V/D, no abdominal pain  : no dysuria  Musculoskeletal: no joint pain or swelling  Skin: no rash or unusual bruising  Neurologic: no headaches, no dizziness    Physical Exam:  ED Triage Vitals [04/20/19 1521]   Enc Vitals Group      BP (!) 155/98      Pulse 97      Resp 18      Temp 98.4 °F (36.9 °C)      Temp Source Oral      SpO2 98 %      Weight       Height       Head Circumference       Peak Flow       Pain Score       Pain Loc       Pain Edu? Excl. in [address]? My pulse oximetry interpretation is which is within the normal range    GENERAL APPEARANCE: Awake and alert. Cooperative. Well appearing. HEAD: Normocephalic. Atraumatic. EYES: PERRL, normal conjunctiva, EOMI  ENT: Mucous membranes are moist. Normal oropharynx  NECK: Supple. HEART: RRR. LUNGS: Respirations unlabored without retractions. CTAB. ABDOMEN: Soft. Non-tender, nondistended. No guarding or rebound. EXTREMITIES: No acute deformities. No swelling or edema  SKIN: Warm and dry. NEUROLOGICAL: Alert at baseline mental status. Noted right facial droop.   Decreased nasolabial fold on the right as compared to the left.  Drooping of the right mouth and unable to smile. Unable to fully close right eye. He can slightly wrinkle the right forehead but not as much on the right as compared to the left. Complains of some mildly decreased sensation to the right face at V2 - V3 distribution. Motor 5 out of 5 in all extremities with sensation intact to light touch, no cerebellar drift, no ataxia and normal gait  PSYCHIATRIC: Normal mood.     I have reviewed and interpreted all of the currently available lab results from this visit (if applicable):  Results for orders placed or performed during the hospital encounter of 04/20/19   CBC Auto Differential   Result Value Ref Range    WBC 10.0 4.0 - 11.0 K/uL    RBC 5.48 4.20 - 5.90 M/uL    Hemoglobin 16.4 13.5 - 17.5 g/dL    Hematocrit 48.1 40.5 - 52.5 %    MCV 87.8 80.0 - 100.0 fL    MCH 30.0 26.0 - 34.0 pg    MCHC 34.1 31.0 - 36.0 g/dL    RDW 13.8 12.4 - 15.4 %    Platelets 962 066 - 954 K/uL    MPV 7.8 5.0 - 10.5 fL    Neutrophils % 72.8 %    Lymphocytes % 17.8 %    Monocytes % 8.1 %    Eosinophils % 1.0 %    Basophils % 0.3 %    Neutrophils # 7.3 1.7 - 7.7 K/uL    Lymphocytes # 1.8 1.0 - 5.1 K/uL    Monocytes # 0.8 0.0 - 1.3 K/uL    Eosinophils # 0.1 0.0 - 0.6 K/uL    Basophils # 0.0 0.0 - 0.2 K/uL   Comprehensive Metabolic Panel   Result Value Ref Range    Sodium 137 136 - 145 mmol/L    Potassium 4.1 3.5 - 5.1 mmol/L    Chloride 104 99 - 110 mmol/L    CO2 22 21 - 32 mmol/L    Anion Gap 11 3 - 16    Glucose 124 (H) 70 - 99 mg/dL    BUN 13 7 - 20 mg/dL    CREATININE 1.0 0.9 - 1.3 mg/dL    GFR Non-African American >60 >60    GFR African American >60 >60    Calcium 9.1 8.3 - 10.6 mg/dL    Total Protein 7.4 6.4 - 8.2 g/dL    Alb 3.9 3.4 - 5.0 g/dL    Albumin/Globulin Ratio 1.1 1.1 - 2.2    Total Bilirubin 0.4 0.0 - 1.0 mg/dL    Alkaline Phosphatase 119 40 - 129 U/L    ALT 25 10 - 40 U/L    AST 23 15 - 37 U/L    Globulin 3.5 g/dL   Protime-INR   Result Value Ref Range    Protime 13.0 9.8 - 13.0 sec    INR 1.14 0.86 - 1.14   EKG 12 Lead   Result Value Ref Range    Ventricular Rate 92 BPM    Atrial Rate 92 BPM    P-R Interval 148 ms    QRS Duration 84 ms    Q-T Interval 356 ms    QTc Calculation (Bazett) 440 ms    P Axis 31 degrees    R Axis -28 degrees    T Axis 52 degrees    Diagnosis Normal sinus rhythmNormal ECG         Radiographs:  [x] Radiologist's Report Reviewed:   CT Head WO Contrast (Final result)   Result time 04/20/19 15:55:38   Final result by Oseas Thompson MD (04/20/19 15:55:38)                Impression:    No acute intracranial abnormality. EKG: (All EKG's are interpreted by myself in the absence of a cardiologist) normal sinus rhythm at 92 bpm.  Normal axis. Normal intervals. No ectopy. No ST or T-wave changes. No previous EKG for comparison. MDM:  55-year-old male who presents complaining of facial droop. Consider CVA, TIA, Bell's palsy, Lyme disease. Patient's history seems most consistent with Bell's palsy although able to wrinkle right forehead somewhat. CT head is unremarkable. EKG shows no dysrhythmia. Laboratory evaluation is unremarkable. Discussed with stroke neurologist.  Lupe Turner for further evaluation with MRI to delineate if this is CVA versus bells palsy. All results and plan of care discussed with the patient and his wife. They are in agreement. Hospitalist is consulted for admission. Clinical Impression:  1. Facial droop        Comment: Please note this report has been produced using speech recognition software and may contain errors related to that system including errors in grammar, punctuation, and spelling, as well as words and phrases that may be inappropriate.  If there are any questions or concerns please feel free to contact the dictating physician for clarification      MD Huy Everett MD  04/20/19 2053

## 2019-04-21 LAB
CHOLESTEROL, TOTAL: 159 MG/DL (ref 0–199)
EKG ATRIAL RATE: 92 BPM
EKG DIAGNOSIS: NORMAL
EKG P AXIS: 31 DEGREES
EKG P-R INTERVAL: 148 MS
EKG Q-T INTERVAL: 356 MS
EKG QRS DURATION: 84 MS
EKG QTC CALCULATION (BAZETT): 440 MS
EKG R AXIS: -28 DEGREES
EKG T AXIS: 52 DEGREES
EKG VENTRICULAR RATE: 92 BPM
HDLC SERPL-MCNC: 41 MG/DL (ref 40–60)
LDL CHOLESTEROL CALCULATED: 102 MG/DL
TRIGL SERPL-MCNC: 78 MG/DL (ref 0–150)
VLDLC SERPL CALC-MCNC: 16 MG/DL

## 2019-04-21 PROCEDURE — 2060000000 HC ICU INTERMEDIATE R&B

## 2019-04-21 PROCEDURE — 80061 LIPID PANEL: CPT

## 2019-04-21 PROCEDURE — 6370000000 HC RX 637 (ALT 250 FOR IP): Performed by: FAMILY MEDICINE

## 2019-04-21 PROCEDURE — 86618 LYME DISEASE ANTIBODY: CPT

## 2019-04-21 PROCEDURE — 2580000003 HC RX 258: Performed by: FAMILY MEDICINE

## 2019-04-21 PROCEDURE — 6360000002 HC RX W HCPCS: Performed by: NURSE PRACTITIONER

## 2019-04-21 PROCEDURE — 93010 ELECTROCARDIOGRAM REPORT: CPT | Performed by: INTERNAL MEDICINE

## 2019-04-21 PROCEDURE — 92610 EVALUATE SWALLOWING FUNCTION: CPT

## 2019-04-21 PROCEDURE — 92526 ORAL FUNCTION THERAPY: CPT

## 2019-04-21 PROCEDURE — 99254 IP/OBS CNSLTJ NEW/EST MOD 60: CPT | Performed by: PSYCHIATRY & NEUROLOGY

## 2019-04-21 PROCEDURE — 6370000000 HC RX 637 (ALT 250 FOR IP): Performed by: INTERNAL MEDICINE

## 2019-04-21 PROCEDURE — G0378 HOSPITAL OBSERVATION PER HR: HCPCS

## 2019-04-21 PROCEDURE — 36415 COLL VENOUS BLD VENIPUNCTURE: CPT

## 2019-04-21 RX ORDER — ACYCLOVIR 200 MG/1
400 CAPSULE ORAL
Status: DISCONTINUED | OUTPATIENT
Start: 2019-04-21 | End: 2019-04-22 | Stop reason: HOSPADM

## 2019-04-21 RX ORDER — ACYCLOVIR 200 MG/1
200 CAPSULE ORAL
Status: DISCONTINUED | OUTPATIENT
Start: 2019-04-21 | End: 2019-04-21

## 2019-04-21 RX ORDER — DEXAMETHASONE 4 MG/1
4 TABLET ORAL EVERY 12 HOURS SCHEDULED
Status: DISCONTINUED | OUTPATIENT
Start: 2019-04-21 | End: 2019-04-22 | Stop reason: HOSPADM

## 2019-04-21 RX ADMIN — Medication 10 ML: at 21:58

## 2019-04-21 RX ADMIN — ACYCLOVIR 400 MG: 200 CAPSULE ORAL at 14:30

## 2019-04-21 RX ADMIN — DEXAMETHASONE 4 MG: 4 TABLET ORAL at 14:30

## 2019-04-21 RX ADMIN — Medication 10 ML: at 09:43

## 2019-04-21 RX ADMIN — DEXAMETHASONE 4 MG: 4 TABLET ORAL at 00:58

## 2019-04-21 RX ADMIN — ACYCLOVIR 400 MG: 200 CAPSULE ORAL at 21:58

## 2019-04-21 RX ADMIN — ASPIRIN 81 MG: 81 TABLET, COATED ORAL at 09:43

## 2019-04-21 RX ADMIN — PANTOPRAZOLE SODIUM 40 MG: 40 TABLET, DELAYED RELEASE ORAL at 09:43

## 2019-04-21 ASSESSMENT — PAIN SCALES - GENERAL: PAINLEVEL_OUTOF10: 0

## 2019-04-21 NOTE — H&P
HOSPITALISTS HISTORY AND PHYSICAL    4/20/2019 9:56 PM    Patient Information:  Elisha Amaya is a 52 y.o. male [id number]  PCP:  Dori Vegas (Tel: [phone] )    Chief complaint:    Chief Complaint   Patient presents with   Ferris Justice     woke up at 9 am and then realized he couldnt chew or close right eye        History of Present Illness:  Meaghan Lovell is a 52 y.o. male presents with right facial droop and in ability to close eyelid. He noticed the sx upon awakening this am. Since then sx are persistent. He also has right forehead muscle weakness He reports feeling dizziness during the weakness. He denies change in speech or vision . No headache, no other muscle weakness. He does have sick contact wife has respiratory sx  No h/o CVA  He is borderline hypertenisive    REVIEW OF SYSTEMS:   Constitutional: Negative for fever,chills or night sweats  ENT: Negative for rhinorrhea, epistaxis, hoarseness, sore throat. Respiratory: Negative for shortness of breath,wheezing  Cardiovascular: Negative for chest pain, palpitations   Gastrointestinal: Negative for nausea, vomiting, diarrhea  Genitourinary: Negative for polyuria, dysuria   Hematologic/Lymphatic: Negative for bleeding tendency, easy bruising  Musculoskeletal: Negative for myalgias and arthralgias  Neurologic: Negative for confusion,dysarthria. Skin: Negative for itching,rash  Psychiatric: Negative for depression,anxiety, agitation. Endocrine: Negative for polydipsia,polyuria,heat /cold intolerance. Past Medical History:   has a past medical history of Acid reflux, Anesthesia complication, Arthritis, Good's esophagus, and Sleep apnea. Past Surgical History:   has a past surgical history that includes Elbow surgery; other surgical history; Knee arthroscopy (Bilateral); Colonoscopy; Carpal tunnel release (Left, 10/07/2014);  Ulnar tunnel release (Left, 10/7/14); effort  Gastrointestinal: Abdomen soft, non-tender, not distended, normal bowel sounds  Musculoskeletal: No cyanosis in digits, neck supple  Neurology: Right sided facial droop Alert and oriented in time, place and person. No speech or motor deficits  Psychiatry: Appropriate affect. Not agitated  Skin: Warm, dry, normal turgor, no rash    Labs:  CBC:   Lab Results   Component Value Date    WBC 10.0 04/20/2019    RBC 5.48 04/20/2019    HGB 16.4 04/20/2019    HCT 48.1 04/20/2019    MCV 87.8 04/20/2019    MCH 30.0 04/20/2019    MCHC 34.1 04/20/2019    RDW 13.8 04/20/2019     04/20/2019    MPV 7.8 04/20/2019     BMP:    Lab Results   Component Value Date     04/20/2019    K 4.1 04/20/2019     04/20/2019    CO2 22 04/20/2019    BUN 13 04/20/2019    CREATININE 1.0 04/20/2019    CALCIUM 9.1 04/20/2019    GFRAA >60 04/20/2019    LABGLOM >60 04/20/2019    GLUCOSE 124 04/20/2019       Chest Xray:   EKG:        Problem List  Active Problems:    Facial droop  Resolved Problems:    * No resolved hospital problems. *        Assessment/Plan:         1. Right sided facial droop and eye lid weakness  Differential Bennett palsy and CVA  CT head is neg for acute findings  MRI ordered  He received decadron in the ER   Neuro checks  Neurology consulted   Cont ASA , Lipitor    Admit as inpatien. I anticipate hospitalization spanning more than two midnights for investigation and treatment of the above medically necessary diagnoses.       J Luis Gonzales MD    4/20/2019 9:56 PM

## 2019-04-21 NOTE — PROGRESS NOTES
Hospitalist Progress Note      PCP: BROCK Flowers    Date of Admission: 4/20/2019    LOS: 1    Chief Complaint:   Chief Complaint   Patient presents with   Helena Bains     woke up at 9 am and then realized he couldnt chew or close right eye       Case Summary:   64-year-old gentleman with history of GERD, obstructive sleep apnea who was admitted with acute right facial droop with inability to raise his [place] blink/close his right eye. He denies any recent tick bite. No headaches, no diplopia    Active Hospital Problems    Diagnosis Date Noted    Bell's palsy [G51.0]     YOUSIF (obstructive sleep apnea) [G47.33]     Facial droop [R29.810] 04/20/2019       Assessment/Plan:  Principal Problem:    Bell's palsy with right Facial droop: Given no other focal neurology with low motor facial nerve pathology, this be fits Bell's palsy. He was seen by neurology and started on steroids.  -Continue Decadron orally for 10-14 days  -Continue acyclovir for 10-14 days  -Continue aspirin and statin  -Get MRI scan  -Neurology following  -Right eye patch    Active Problems:    YOUSIF (obstructive sleep apnea): Will advise for sleep study with primary care physician      Medications:  Reviewed  Infusion Medications   Scheduled Medications    dexamethasone  4 mg Oral 2 times per day    pantoprazole  40 mg Oral QAM AC    sodium chloride flush  10 mL Intravenous 2 times per day    enoxaparin  40 mg Subcutaneous Nightly    aspirin  81 mg Oral Daily    atorvastatin  40 mg Oral Nightly     PRN Meds: sodium chloride flush, magnesium hydroxide, ondansetron        DVT Prophylaxis: Subcut enoxaparin  Diet: DIET DYSPHAGIA III ADVANCED;  Code Status: Full Code    Dispo: Anticipate discharge in the next 24 hrs    ____________________________________________________________________________    Subjective:   Overnight Events:   Uneventful overnight.   No headaches  Still with right facial droop       Physical Exam Performed:  /87   Pulse 99   Temp 97.7 °F (36.5 °C) (Oral)   Resp 17   SpO2 97%   General appearance: No apparent distress, appears stated age and cooperative. Has right facial droop  HEENT: Normocephalic, atraumatic, MMM, No sclera icterus/conjuctival palor  Neck: Supple, no thyromegally. No jugular venous distention. Respiratory:  Normal respiratory effort. Clear to auscultation, no Rales/Wheezes/Rhonchi. Cardiovascular: S1/S2 without murmurs, rubs or gallops. RRR  Abdomen: Soft, non-tender, non-distended, bowel sounds present. Musculoskeletal: No clubbing, cyanosis or edema bilaterally. Skin: Skin color, texture, turgor normal.  No rashes or lesions. Neurologic:  Cranial nerves: II-XII intact except VII, VINCENZO, No focal sensory/motor deficits. Noted right facial droop      No intake or output data in the 24 hours ending 04/21/19 1250    Labs:   Recent Labs     04/20/19  1533   WBC 10.0   HGB 16.4   HCT 48.1         Recent Labs     04/20/19  1533      K 4.1      CO2 22   BUN 13   CREATININE 1.0   CALCIUM 9.1   AST 23   ALT 25   BILITOT 0.4   ALKPHOS 119     Recent Labs     04/20/19  1925 04/20/19  2317   TROPONINI <0.01 <0.01       Urinalysis:    Lab Results   Component Value Date    NITRU Negative 03/13/2018    BLOODU Negative 03/13/2018    SPECGRAV 1.010 03/13/2018    GLUCOSEU Negative 03/13/2018       Radiology:  CT Head WO Contrast   Final Result   No acute intracranial abnormality.          MRI BRAIN WO CONTRAST    (Results Pending)           Bebe Yang MD

## 2019-04-21 NOTE — PROGRESS NOTES
with right side oral stasis. Pt able to utilize lingual sweeps to clear. Encouraged pt to clear solids with tongue vs liquid \"wash. \"  Soft solids tolerated with improved bolus control and oral clearance. No overt clinical s/s of aspiration/penetration assessed with any texture trials this date. Recommend downgrade to Dysphagia III (Advanced) diet due to difficulty with regular textures. Extensive education provided regarding safe swallowing strategies (I.e. Slow rate, small bites/sips, upright positioning, lingual sweeps, and chewing on stronger side). Pt verbalized understanding. Pt with questions regarding Bell's palsy and exercises to improve droop. Education provided regarding waiting until after acute phase to initiate exercises if dx of Bell's palsy is confirmed. Pt verbalized understanding and agreement with recommendations. Dietary Recommendations:  Dysphagia III (Advanced) texture diet with thin liquids    Strategies: 90 degree positioning with all p.o. intake; small bites/sips; alternate textures through meal; reduce rate of intake    Treatment/Goals: Speech therapy for dysphagia tx 3-5 times per week.      ST.) Pt will tolerate recommended diet without s/s of aspiration   2.) Pt will tolerate diet advance to least restricted diet, as clinically indicated, with no signs of aspiration     Oral motor Exam:  Dentition: Adequate  Labial/Facial: Reduced ROM, strength, and coordination; Right facial droop  Lingual: Reduced ROM and strength (right>left)  Voice: grossly WFL    Oral Phase:   Reduced/prolonged mastication  Reduced A-P propulsion  Apparent premature bolus loss to pharynx  Uncleared R side oral stasis with regular textures  Anterior bolus loss with thin liquids via cup    Pharyngeal Phase:  Slightly delayed swallow initiation     Patient/Family Education:Education, results and recommendations given to the Pt and nurse, who verbalized understanding    Timed Code Treatment:0 minutes    Total Treatment Time:25 minutes    Discharge Recommendations: Speech Therapy for Speech/Dysphagia treatment at discharge. Signature:     Pia STRONG  CCC-SLP S.P. [id number]  Speech-Language Pathologist
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
## 2019-04-21 NOTE — PROGRESS NOTES
VSS.  NIHSS score increased from a 3 to a 4 dt increased rt side facial paralysis. Pt unable to lift right eyebrow or close right eye completely at this time. Pt continues to have right sided mouth paralysis.

## 2019-04-22 ENCOUNTER — APPOINTMENT (OUTPATIENT)
Dept: MRI IMAGING | Age: 54
End: 2019-04-22
Payer: COMMERCIAL

## 2019-04-22 VITALS
TEMPERATURE: 98.2 F | DIASTOLIC BLOOD PRESSURE: 80 MMHG | SYSTOLIC BLOOD PRESSURE: 160 MMHG | RESPIRATION RATE: 18 BRPM | HEART RATE: 78 BPM | OXYGEN SATURATION: 98 %

## 2019-04-22 LAB
ANION GAP SERPL CALCULATED.3IONS-SCNC: 11 MMOL/L (ref 3–16)
BUN BLDV-MCNC: 17 MG/DL (ref 7–20)
CALCIUM SERPL-MCNC: 9.3 MG/DL (ref 8.3–10.6)
CHLORIDE BLD-SCNC: 104 MMOL/L (ref 99–110)
CO2: 24 MMOL/L (ref 21–32)
CREAT SERPL-MCNC: 0.8 MG/DL (ref 0.9–1.3)
GFR AFRICAN AMERICAN: >60
GFR NON-AFRICAN AMERICAN: >60
GLUCOSE BLD-MCNC: 157 MG/DL (ref 70–99)
POTASSIUM SERPL-SCNC: 4.2 MMOL/L (ref 3.5–5.1)
SODIUM BLD-SCNC: 139 MMOL/L (ref 136–145)

## 2019-04-22 PROCEDURE — 6370000000 HC RX 637 (ALT 250 FOR IP): Performed by: FAMILY MEDICINE

## 2019-04-22 PROCEDURE — 36415 COLL VENOUS BLD VENIPUNCTURE: CPT

## 2019-04-22 PROCEDURE — 97161 PT EVAL LOW COMPLEX 20 MIN: CPT

## 2019-04-22 PROCEDURE — 6370000000 HC RX 637 (ALT 250 FOR IP): Performed by: INTERNAL MEDICINE

## 2019-04-22 PROCEDURE — 97165 OT EVAL LOW COMPLEX 30 MIN: CPT

## 2019-04-22 PROCEDURE — 80048 BASIC METABOLIC PNL TOTAL CA: CPT

## 2019-04-22 PROCEDURE — 6360000002 HC RX W HCPCS: Performed by: NURSE PRACTITIONER

## 2019-04-22 PROCEDURE — 70551 MRI BRAIN STEM W/O DYE: CPT

## 2019-04-22 PROCEDURE — G0378 HOSPITAL OBSERVATION PER HR: HCPCS

## 2019-04-22 RX ORDER — ACYCLOVIR 200 MG/1
400 CAPSULE ORAL
Qty: 100 CAPSULE | Refills: 0 | Status: SHIPPED | OUTPATIENT
Start: 2019-04-22 | End: 2019-05-02

## 2019-04-22 RX ORDER — DEXAMETHASONE 4 MG/1
4 TABLET ORAL EVERY 12 HOURS SCHEDULED
Qty: 20 TABLET | Refills: 0 | Status: SHIPPED | OUTPATIENT
Start: 2019-04-22 | End: 2019-05-02

## 2019-04-22 RX ADMIN — ASPIRIN 81 MG: 81 TABLET, COATED ORAL at 09:24

## 2019-04-22 RX ADMIN — ACYCLOVIR 400 MG: 200 CAPSULE ORAL at 05:58

## 2019-04-22 RX ADMIN — DEXAMETHASONE 4 MG: 4 TABLET ORAL at 11:15

## 2019-04-22 RX ADMIN — ACYCLOVIR 400 MG: 200 CAPSULE ORAL at 11:16

## 2019-04-22 RX ADMIN — PANTOPRAZOLE SODIUM 40 MG: 40 TABLET, DELAYED RELEASE ORAL at 05:58

## 2019-04-22 RX ADMIN — DEXAMETHASONE 4 MG: 4 TABLET ORAL at 01:02

## 2019-04-22 RX ADMIN — ACYCLOVIR 400 MG: 200 CAPSULE ORAL at 01:01

## 2019-04-22 ASSESSMENT — PAIN SCALES - GENERAL: PAINLEVEL_OUTOF10: 0

## 2019-04-22 NOTE — DISCHARGE SUMMARY
Hospital Medicine Discharge Summary    Patient ID: Heaven Ramsey      Patient's PCP: BROCK Blair    Admit Date: 4/20/2019     Discharge Date: 4/22/2019      Admitting Physician: Dea Hernandez MD     Discharge Physician: Zuleyma Treviño MD     Discharge Diagnoses: Active Hospital Problems    Diagnosis Date Noted    Bell's palsy [G51.0]     YOUSIF (obstructive sleep apnea) [G47.33]     Facial droop [R29.810] 04/20/2019       The patient was seen and examined on day of discharge and this discharge summary is in conjunction with any daily progress note from day of discharge. Hospital Course: The patient is a 49-year-old gentleman with history of GERD, obstructive sleep apnea who was admitted with acute right facial droop with inability to raise his eye brows blink/close his right eye. He denies any recent tick bite. No headaches, no diplopia. MRI of the brain was unremarkable for acute ischemia. He was evaluated by neurologist and recommended for steroid therapy with antivirals for 10 days          Active Hospital Problems     Diagnosis Date Noted    Bell's palsy [G51.0]      YOUSIF (obstructive sleep apnea) [G47.33]      Facial droop [R29.810] 04/20/2019         Assessment/Plan:  Principal Problem:    Bell's palsy with right Facial droop: Given no other focal neurology with low motor facial nerve pathology, this be fits Bell's palsy. He was seen by neurology and started on steroids.  -Continue Decadron orally for 10days  -Continue acyclovir for 10days  -Neurology clinic following in 1-2 weeks  -Right eye patch     Active Problems:    YOUSIF (obstructive sleep apnea): Will advise for sleep study with primary care physician         Physical Exam Performed:     BP (!) 160/80   Pulse 78   Temp 98.2 °F (36.8 °C) (Temporal)   Resp 18   SpO2 98%       General appearance:  No apparent distress, appears stated age and cooperative.   HEENT:  Normal cephalic, atraumatic without obvious deformity. Pupils equal, round, and reactive to light. Extra ocular muscles intact. Conjunctivae/corneas clear. Right facial paralysis  Neck: Supple, with full range of motion. No jugular venous distention. Trachea midline. Respiratory:  Normal respiratory effort. Clear to auscultation, bilaterally without Rales/Wheezes/Rhonchi. Cardiovascular:  Regular rate and rhythm with normal S1/S2 without murmurs, rubs or gallops. Abdomen: Soft, non-tender, non-distended with normal bowel sounds. Musculoskeletal:  No clubbing, cyanosis or edema bilaterally. Full range of motion without deformity. Skin: Skin color, texture, turgor normal.  No rashes or lesions. Neurologic:  Neurovascularly intact without any focal sensory/motor deficits. Cranial nerves: II-XII intact, grossly non-focal.  Except for facial nerve on the right        Labs: For convenience and continuity at follow-up the following most recent labs are provided:      CBC:    Lab Results   Component Value Date    WBC 10.0 04/20/2019    HGB 16.4 04/20/2019    HCT 48.1 04/20/2019     04/20/2019       Renal:    Lab Results   Component Value Date     04/22/2019    K 4.2 04/22/2019     04/22/2019    CO2 24 04/22/2019    BUN 17 04/22/2019    CREATININE 0.8 04/22/2019    CALCIUM 9.3 04/22/2019         Significant Diagnostic Studies    Radiology:   MRI BRAIN WO CONTRAST   Final Result   No evidence of acute ischemia      Small left mastoid effusion         CT Head WO Contrast   Final Result   No acute intracranial abnormality.                 Consults:     IP CONSULT TO HOSPITALIST  IP CONSULT TO NEUROLOGY    Disposition:  Home     Condition at Discharge: Stable    Discharge Instructions/Follow-up:    PCP follow-up in 1-2 weeks  Neurology clinic follow-up in 1-2 weeks    Code Status:  Full code     Activity: activity as tolerated    Diet: regular diet      Discharge Medications:     Discharge Medication List as of 4/22/2019 11:27 AM Details   acyclovir (ZOVIRAX) 200 MG capsule Take 2 capsules by mouth 5 times daily for 10 days, Disp-100 capsule, R-0Normal      dexamethasone (DECADRON) 4 MG tablet Take 1 tablet by mouth every 12 hours for 10 days, Disp-20 tablet, R-0Normal              Details   diclofenac sodium 1 % GEL APPLY FOUR GRAMS TO AFFECTED AREA(S) FOUR TIMES DAILY, Disp-4 Tube, R-2, Normal      pantoprazole (PROTONIX) 40 MG tablet Take 40 mg by mouth dailyHistorical Med             Time Spent on discharge is more than 20 minutes in the examination, evaluation, counseling and review of medications and discharge plan. Signed: Rebecca Lora MD   4/22/2019      Thank you BROCK Barrera for the opportunity to be involved in this patient's care. If you have any questions or concerns please feel free to contact me at [phone].

## 2019-04-22 NOTE — PLAN OF CARE
Problem: HEMODYNAMIC STATUS  Goal: Patient has stable vital signs and fluid balance  4/22/2019 0238 by Gigi Shepard RN  Outcome: Ongoing  Note:   Pt vital signs stable. 4/22/2019 0231 by Gigi Shepard RN  Outcome: Ongoing  Note:   Pt VSS. Problem: ACTIVITY INTOLERANCE/IMPAIRED MOBILITY  Goal: Mobility/activity is maintained at optimum level for patient  4/22/2019 0238 by Gigi Shepard RN  Outcome: Ongoing  Note:   Pt able to be mobile in the room. 4/22/2019 0231 by Gigi Shepard RN  Note:   Pt at baseline activity. Problem: COMMUNICATION IMPAIRMENT  Goal: Ability to express needs and understand communication  4/22/2019 0238 by Gigi Shepard RN  Outcome: Ongoing  Note:   Pt communication clear. 4/22/2019 0231 by Gigi Shepard RN  Note:   Pt understandable when communicating.

## 2019-04-22 NOTE — PROGRESS NOTES
Data- discharge order received, pt verbalized agreement to discharge, disposition to previous residence, no needs for HHC/DME. Action- discharge instructions prepared and given to pt, pt verbalized understanding. Medication information packet given r/t NEW and/or CHANGED prescriptions emphasizing name/purpose/side effects, pt verbalized understanding. Discharge instruction summary: Diet- Dysphagia, Activity- As tolerated, Primary Care Physician as follows: Prachijolynn Costa, [place] [phone] f/u appointment to be made by patient for 7-10 days post hospitalization, immunizations reviewed and are current, prescription medications filled at [facility]. Pt to . Zainab [place] Response- Pt belongings gathered, IV removed. Disposition is home (no HHC/DME needs), [facility], taken to lobby via w/c w/ PCA, no complications. Data- discharge order received, pt verbalized agreement to discharge, disposition to previous residence, no needs for HHC/DME.

## 2019-04-22 NOTE — PROGRESS NOTES
Physical Therapy    Facility/Department: [address]  Initial Assessment/Discharge Summary    NAME: Michoacano Kiran  : 1965  MRN: [id number]    Date of Service: 2019    Discharge Recommendations: Michoacano Kiran scored a 24/24 on the AM-PAC short mobility form. At this time, no further PT is recommended upon discharge due to pt being at baseline functional mobility. Pt does not require skilled PT services at this time. As such, pt is being d/c from acute PT caseload. Please re-order if status changes. Recommend patient returns to prior setting with prior services. PT Equipment Recommendations  Equipment Needed: No    Assessment   Assessment: Pt is presenting at baseline independent functional mobility and does not require skilled PT services at this time. As such, pt is being d/c from acute PT caseload. Prognosis: Excellent  Decision Making: Low Complexity  Clinical Presentation: stable  Patient Education: PT eval, d/c recommendations, OP therapy for possible follow up for symptoms  Barriers to Learning: none  No Skilled PT: Independent with functional mobility   REQUIRES PT FOLLOW UP: No  Activity Tolerance  Activity Tolerance: Patient Tolerated treatment well       Patient Diagnosis(es): The encounter diagnosis was Facial droop.     has a past medical history of Acid reflux, Anesthesia complication, Arthritis, Good's esophagus, H/O total knee replacement, and Sleep apnea. has a past surgical history that includes Elbow surgery; other surgical history; Knee arthroscopy (Bilateral); Colonoscopy; Carpal tunnel release (Left, 10/07/2014); Ulnar tunnel release (Left, 10/7/14); Carpal tunnel release (Right, 14); Ulnar tunnel release (Right, 14); Umbilical hernia repair (6/25/15); Upper gastrointestinal endoscopy; Upper gastrointestinal endoscopy (2017);  Anterior cruciate ligament repair (); other surgical history; joint replacement; and pr incise finger tendon sheath (Left, 10/18/2018). Restrictions  Restrictions/Precautions  Restrictions/Precautions: Fall Risk(med)  Position Activity Restriction  Other position/activity restrictions: 52y.o.  years old male with history of reflux and sleep apnea was admitted to the hospital yesterday with acute right facial droop. Symptoms started yesterday morning upon awakening. He describes sudden onset of facial droop, drooling and difficulties speaking. He was having difficulties with his right eye closing and infrequent blinking. No triggers. No other associated symptoms. No headache, double vision, weakness or numbness or tingling. No recent fever or chills. No recent skin rash or tick bites. Degree was severe and duration was persistent. He decided to come to the hospital where he was admitted. MRI negative for infarct     Vision/Hearing  Vision: Impaired  Vision Exceptions: Wears glasses for reading  Hearing: Within functional limits       Subjective  General  Chart Reviewed: Yes  Response To Previous Treatment: Not applicable  Family / Caregiver Present: No  Diagnosis: facial droop  Follows Commands: Within Functional Limits  General Comment  Comments: Pt supine in bed upon arrival. Agreeable to PT/OT eval.   Subjective  Subjective: Pt denying pain at this time.    Pain Screening  Patient Currently in Pain: Denies  Vital Signs  Patient Currently in Pain: Denies       Orientation  Orientation  Overall Orientation Status: Within Functional Limits     Social/Functional History  Social/Functional History  Lives With: Spouse  Type of Home: House  Home Layout: One level, Laundry in basement  Home Access: Stairs to enter without rails  Entrance Stairs - Number of Steps: 2-3  Bathroom Shower/Tub: Walk-in shower  Bathroom Toilet: Handicap height  Bathroom Equipment: Grab bars in shower, Shower chair  Home Equipment: Cane  ADL Assistance: [address] [place] [address] Avenue: Independent  Homemaking Responsibilities: Yes  Ambulation Assistance: Independent  Transfer Assistance: Independent  Active : Yes  Leisure & Hobbies: hiking/kayaking with dog  Additional Comments: no falls reported in last 6 months       Objective  AROM RLE (degrees)  RLE AROM: WFL  AROM LLE (degrees)  LLE AROM : WFL  Strength RLE  Strength RLE: WFL  Comment: grossly 4+/5  Strength LLE  Strength LLE: WFL  Comment: grossly 4+/5  Tone RLE  RLE Tone: Normotonic  Tone LLE  LLE Tone: Normotonic  Motor Control  Gross Motor?: WFL  Sensation  Overall Sensation Status: WFL(no sensational changes on R side of face per pt)  Bed mobility  Supine to Sit: Independent  Sit to Supine: Independent  Scooting: Independent  Transfers  Sit to Stand: Independent  Stand to sit: Independent  Ambulation  Ambulation?: Yes  Ambulation 1  Surface: level tile  Device: No Device  Assistance: Independent  Quality of Gait: appropriate lupillo, appropriate Raquel, slight sway towards L noted  Distance: 200'  Comments: Pt able to negotiate obstacles without assist. No LOB. Pt reporting ambulation at baseline. Stairs/Curb  Stairs?: Yes  Stairs  # Steps : 12  Stairs Height: 6\"  Rails: Right ascending  Device: No Device  Assistance: Independent  Comment: Negotiated with reciprocal pattern, no LOB. Balance  Posture: Good  Sitting - Static: Good  Sitting - Dynamic: Good  Standing - Static: Good  Standing - Dynamic: Good        Plan   Plan  Times per week: d/c  Safety Devices  Type of devices:  All fall risk precautions in place, Call light within reach, Left in bed, Gait belt, Nurse notified  Restraints  Initially in place: No    G-Code       OutComes Score    AM-PAC Score  AM-PAC Inpatient Mobility Raw Score : 24  AM-PAC Inpatient T-Scale Score : 61.14  Mobility Inpatient CMS 0-100% Score: 0  Mobility Inpatient CMS G-Code Modifier : 509 [address]          Goals  Short term goals  Time Frame for Short term goals: none, d/c from acute PT caseload       Therapy Time   Individual Concurrent Group

## 2019-04-22 NOTE — PROGRESS NOTES
Occupational Therapy   Occupational Therapy Initial Assessment/Discharge Summary  Date: 2019   Patient Name: Alysha Joyner  MRN: [id number]     : 1965    Date of Service: 2019    Discharge Recommendations: Alysha Joyner scored a 24/24 on the AM-[facility] ADL Inpatient form. At this time, no further OT is recommended upon discharge due to patient near baseline function. Recommend patient returns to prior setting with prior services. OT Equipment Recommendations  Equipment Needed: No    Assessment   Assessment: Patient is near baseline function for occupational performance. No OT indicated at this time. Discussed with patient potential to follow up with outpatient therapy pending progress of medicinal intervention for bells palsy  Prognosis: Good  Decision Making: Low Complexity  Exam: as above  Patient Education: Role of OT, discharge, evaluation  REQUIRES OT FOLLOW UP: No  Activity Tolerance  Activity Tolerance: Patient Tolerated treatment well  Safety Devices  Safety Devices in place: Yes  Type of devices: Call light within reach;Nurse notified; All fall risk precautions in place; Left in bed(med fall risk no alarm)           Patient Diagnosis(es): The encounter diagnosis was Facial droop.     has a past medical history of Acid reflux, Anesthesia complication, Arthritis, Good's esophagus, H/O total knee replacement, and Sleep apnea. has a past surgical history that includes Elbow surgery; other surgical history; Knee arthroscopy (Bilateral); Colonoscopy; Carpal tunnel release (Left, 10/07/2014); Ulnar tunnel release (Left, 10/7/14); Carpal tunnel release (Right, 14); Ulnar tunnel release (Right, 14); Umbilical hernia repair (6/25/15); Upper gastrointestinal endoscopy; Upper gastrointestinal endoscopy (2017); Anterior cruciate ligament repair (); other surgical history; joint replacement; and pr incise finger tendon sheath (Left, 10/18/2018). Restrictions  Restrictions/Precautions  Restrictions/Precautions: Fall Risk(med)  Position Activity Restriction  Other position/activity restrictions: 52y.o.  years old male with history of reflux and sleep apnea was admitted to the hospital yesterday with acute right facial droop. Symptoms started yesterday morning upon awakening. He describes sudden onset of facial droop, drooling and difficulties speaking. He was having difficulties with his right eye closing and infrequent blinking. No triggers. No other associated symptoms. No headache, double vision, weakness or numbness or tingling. No recent fever or chills. No recent skin rash or tick bites. Degree was severe and duration was persistent. He decided to come to the hospital where he was admitted.  MRI negative for infarct    Subjective   General  Chart Reviewed: Yes  Diagnosis: Facial droop, Omar palsy  Subjective  Subjective: Patient supine in bed at time of therapist arrival. Pleasant and agreeable to evaluation  Pain Assessment  Pain Assessment: 0-10  Pain Level: 0    Social/Functional History  Social/Functional History  Lives With: Spouse  Type of Home: House  Home Layout: One level, Laundry in basement  Home Access: Stairs to enter without rails  Entrance Stairs - Number of Steps: 2-3  Bathroom Shower/Tub: Walk-in shower  Bathroom Toilet: Handicap height  Bathroom Equipment: Grab bars in shower, Shower chair  Home Equipment: Cane  ADL Assistance: Independent  Homemaking Assistance: Independent  Homemaking Responsibilities: Yes  Ambulation Assistance: Independent  Transfer Assistance: Independent  Active : Yes  Leisure & Hobbies: hiking/kayaking with dog  Additional Comments: no falls reported in last 6 months       Objective   Vision: Impaired  Vision Exceptions: Wears glasses for reading  Hearing: Within functional limits    Orientation  Overall Orientation Status: Within Normal Limits     Balance  Sitting Balance: Independent  Standing Balance: Independent  Functional Mobility  Functional - Mobility Device: No device  Activity: Retrieve items;Transport items; Other  Assist Level: Independent  Functional Mobility Comments: Obtaining shoes to wear for ambulation in halls, stairwell navigation  ADL  LE Dressing: Independent  Additional Comments: Pt ambulated to retrieve slip on shoes, donned seated EOB. Declined additional ADL  Tone RUE  RUE Tone: Normotonic  Tone LUE  LUE Tone: Normotonic  Coordination  Movements Are Fluid And Coordinated: Yes     Bed mobility  Supine to Sit: Independent  Sit to Supine: Independent  Scooting: Independent  Transfers  Sit to stand: Independent(from bed)  Stand to sit:  Independent(to bed)     Cognition  Overall Cognitive Status: WNL        Sensation  Overall Sensation Status: WFL(no sensational changes on R side of face per pt)        LUE AROM (degrees)  LUE AROM : WFL  RUE AROM (degrees)  RUE AROM : WFL  LUE Strength  Gross LUE Strength: WFL  L Hand Grasp: 5/5  RUE Strength  Gross RUE Strength: WFL  R Hand Grasp: 5/5            Plan   Plan  Times per week: eval/dc    G-Code     OutComes Score                                                  AM-PAC Score        AM-[facility] Inpatient Daily Activity Raw Score: 24  AM-PAC Inpatient ADL T-Scale Score : 57.54  ADL Inpatient CMS 0-100% Score: 0  ADL Inpatient CMS G-Code Modifier : 509 [address]    Goals  Short term goals  Time Frame for Short term goals: No goals identified; patient independent       Therapy Time   Individual Concurrent Group Co-treatment   Time In 0836         Time Out 0849         Minutes 13            Timed Code Treatment Minutes:   0 minutes    Total Treatment Minutes:  0 minutes (Billing split with PT secondary to patient's observation status)    KAYCEE Watson OTR/L [id number]    Florinda Hernández OT

## 2019-04-22 NOTE — PROGRESS NOTES
Assessment complete, see doc flowsheet. Patient resting in bed, call light in reach, bed in lowest position, brake set. Patient denies any needs at this time. Patient encouraged to call if needs arise.   Lonia Scheuermann RN

## 2019-04-22 NOTE — PLAN OF CARE
Problem: HEMODYNAMIC STATUS  Goal: Patient has stable vital signs and fluid balance  Outcome: Ongoing  Note:   Pt VSS. Problem: ACTIVITY INTOLERANCE/IMPAIRED MOBILITY  Goal: Mobility/activity is maintained at optimum level for patient  Note:   Pt at baseline activity. Problem: COMMUNICATION IMPAIRMENT  Goal: Ability to express needs and understand communication  Note:   Pt understandable when communicating.

## 2019-04-22 NOTE — PROGRESS NOTES
Patient doing well still unable to wrinkle forehead on right side. Wife bought patch for his right eye, very helpful. Will continue to monitor.

## 2019-04-22 NOTE — PLAN OF CARE
Pt with confirmed no stroke per MRI. Pt with diagnosis of Hibernia palsy. Pt with no mobility deficits. Pt states no difficulty swallowing, but on dysphagia diet.  Awatiing discharge

## 2019-04-23 LAB — LYME, EIA: 0.45 LIV (ref 0–1.2)

## 2019-04-23 NOTE — PROGRESS NOTES
Speech/Language Pathology  Discharge Note    Name: Heaven Ramsey   : 1965     Speech Therapy/Dysphagia  Pt discharged to home on 19. Bedside Swallow Eval completed 19 (see report). No goals met prior to discharge. Recommend: Continued Dysphagia treatment at home, with goals and treatment per [facility].     DIET LEVEL AT DISCHARGE:  Dysphagia III (Advanced) texture diet with thin liquids    DYSPHAGIA GOALS:  1.) Pt will tolerate recommended diet without s/s of aspiration (GOAL NOT MET)  2.) Pt will tolerate diet advance to least restricted diet, as clinically indicated, with no signs of aspiration (GOAL NOT MET)    Master Quigley M.A., [address] [place] [address]  Speech-Language Pathologist

## 2019-05-02 ENCOUNTER — TELEPHONE (OUTPATIENT)
Dept: FAMILY MEDICINE CLINIC | Age: 54
End: 2019-05-02

## 2019-05-02 DIAGNOSIS — G51.0 BELL'S PALSY: Primary | ICD-10-CM

## 2019-05-06 ENCOUNTER — HOSPITAL ENCOUNTER (OUTPATIENT)
Age: 54
Discharge: HOME | End: 2019-05-06
Payer: COMMERCIAL

## 2019-05-06 DIAGNOSIS — G51.0 BELL'S PALSY: ICD-10-CM

## 2019-05-06 PROCEDURE — 86618 LYME DISEASE ANTIBODY: CPT

## 2019-05-08 LAB — LYME, EIA: 0.48 LIV (ref 0–1.2)

## 2019-05-24 ENCOUNTER — OFFICE VISIT (OUTPATIENT)
Dept: FAMILY MEDICINE CLINIC | Age: 54
End: 2019-05-24
Payer: COMMERCIAL

## 2019-05-24 VITALS
HEART RATE: 97 BPM | SYSTOLIC BLOOD PRESSURE: 144 MMHG | OXYGEN SATURATION: 98 % | BODY MASS INDEX: 33.61 KG/M2 | DIASTOLIC BLOOD PRESSURE: 93 MMHG | WEIGHT: 247.8 LBS

## 2019-05-24 DIAGNOSIS — M17.12 ARTHRITIS OF LEFT KNEE: ICD-10-CM

## 2019-05-24 DIAGNOSIS — G51.0 BELL'S PALSY: Primary | ICD-10-CM

## 2019-05-24 PROCEDURE — 99214 OFFICE O/P EST MOD 30 MIN: CPT | Performed by: PHYSICIAN ASSISTANT

## 2019-05-24 RX ORDER — TRAMADOL HYDROCHLORIDE 50 MG/1
50 TABLET ORAL 3 TIMES DAILY PRN
Qty: 20 TABLET | Refills: 0 | Status: SHIPPED | OUTPATIENT
Start: 2019-05-24 | End: 2019-06-10 | Stop reason: SDUPTHER

## 2019-05-24 ASSESSMENT — PATIENT HEALTH QUESTIONNAIRE - PHQ9
SUM OF ALL RESPONSES TO PHQ9 QUESTIONS 1 & 2: 0
SUM OF ALL RESPONSES TO PHQ QUESTIONS 1-9: 0
1. LITTLE INTEREST OR PLEASURE IN DOING THINGS: 0
SUM OF ALL RESPONSES TO PHQ QUESTIONS 1-9: 0
2. FEELING DOWN, DEPRESSED OR HOPELESS: 0

## 2019-05-24 ASSESSMENT — ENCOUNTER SYMPTOMS
BACK PAIN: 0
CHEST TIGHTNESS: 0
ABDOMINAL PAIN: 0
TROUBLE SWALLOWING: 0
EYE PAIN: 0
COUGH: 0
VOICE CHANGE: 0
DIARRHEA: 0
SHORTNESS OF BREATH: 0
SORE THROAT: 0
CONSTIPATION: 0

## 2019-05-24 NOTE — PATIENT INSTRUCTIONS
Jesus Herrera was seen today for knee pain and discuss labs. Diagnoses and all orders for this visit:    Bell's palsy    Arthritis of left knee  -     traMADol (ULTRAM) 50 MG tablet; Take 1 tablet by mouth 3 times daily as needed for Pain for up to 7 days. See ortho for consult for knee.

## 2019-05-24 NOTE — PROGRESS NOTES
Subjective:      Patient ID: Salinas Zafar is a 52 y.o. male. HPI Patient is here today for a follow up on his Bell's Palsy. 4/20, he woke up and had a weird feeling in right side of his mouth, food was getting caught in his cheek. Then later that day he could not close his right eye. He went to ER, kept him 2 nights to r/o a stroke. He was on antiviral and prednisone for a week after discharge. He does not feel like his symptoms are improving. But the doctor saw him a few weeks later and his speech is better. His coworkers have said his face looks better. His eye gets tired later in the day. He was advocating getting a lyme disease test because he walks the dogs in the woods. His left knee has really been bothering him as well. Was told 3 years ago that he needed knee replacement. He has been trying to put it off. Can't take NSAID due to SE's. Uses diclofenac gel and it helps some. Review of Systems   Constitutional: Negative for appetite change, fatigue and unexpected weight change. HENT: Negative for congestion, dental problem, ear pain, hearing loss, sore throat, trouble swallowing and voice change. Eyes: Negative for pain and visual disturbance. Respiratory: Negative for cough, chest tightness and shortness of breath. Cardiovascular: Negative for chest pain and palpitations. Gastrointestinal: Negative for abdominal pain, constipation and diarrhea. Genitourinary: Negative for difficulty urinating. Musculoskeletal: Positive for arthralgias (left knee pain). Negative for back pain, myalgias and neck pain. Skin: Negative for rash. Neurological: Negative for dizziness, speech difficulty, weakness, numbness and headaches. Right facial droop and can't close right eye   Hematological: Negative for adenopathy. Psychiatric/Behavioral: Negative for confusion and sleep disturbance. The patient is not nervous/anxious.         Objective:   Physical Exam   Constitutional: He is oriented to person, place, and time. Vital signs are normal. He appears well-developed and well-nourished. HENT:   Head: Normocephalic. Right Ear: Tympanic membrane and ear canal normal.   Left Ear: Tympanic membrane and ear canal normal.   Mouth/Throat: Uvula is midline, oropharynx is clear and moist and mucous membranes are normal.   Eyes: Pupils are equal, round, and reactive to light. Conjunctivae are normal.   Unable to close right eye   Musculoskeletal:   Tender to palpate left knee medial to patella, full ROM and strength of left knee, negative varus/valgus, negative lachman's, slight limp   Neurological: He is alert and oriented to person, place, and time. Right facial droop, can feel right side of face but slightly lessened on right side vs left side   Skin:   No erythema or edema to left knee, not warm to touch       Assessment:      Merlene Hector was seen today for knee pain and discuss labs. Diagnoses and all orders for this visit:    Bell's palsy    Arthritis of left knee  -     traMADol (ULTRAM) 50 MG tablet; Take 1 tablet by mouth 3 times daily as needed for Pain for up to 7 days. Plan:      Controlled substances monitoring: possible medication side effects, risk of tolerance and/or dependence, and alternative treatments discussed, no signs of potential drug abuse or diversion identified and OARRS report reviewed today- activity consistent with treatment plan. Going to do PT for face, tramadol prn for knee, see ortho again.            Dori Carrillo

## 2019-06-07 ENCOUNTER — TELEPHONE (OUTPATIENT)
Dept: ORTHOPEDIC SURGERY | Age: 54
End: 2019-06-07

## 2019-06-10 DIAGNOSIS — M17.12 ARTHRITIS OF LEFT KNEE: ICD-10-CM

## 2019-06-10 RX ORDER — TRAMADOL HYDROCHLORIDE 50 MG/1
50 TABLET ORAL 3 TIMES DAILY PRN
Qty: 20 TABLET | Refills: 0 | Status: SHIPPED | OUTPATIENT
Start: 2019-06-10 | End: 2019-07-29 | Stop reason: SDUPTHER

## 2019-07-11 ENCOUNTER — OFFICE VISIT (OUTPATIENT)
Dept: ORTHOPEDIC SURGERY | Age: 54
End: 2019-07-11
Payer: COMMERCIAL

## 2019-07-11 VITALS
DIASTOLIC BLOOD PRESSURE: 86 MMHG | HEART RATE: 75 BPM | SYSTOLIC BLOOD PRESSURE: 134 MMHG | WEIGHT: 250 LBS | HEIGHT: 72 IN | BODY MASS INDEX: 33.86 KG/M2

## 2019-07-11 DIAGNOSIS — S93.402A SPRAIN OF LEFT ANKLE, UNSPECIFIED LIGAMENT, INITIAL ENCOUNTER: ICD-10-CM

## 2019-07-11 DIAGNOSIS — M79.672 LEFT FOOT PAIN: Primary | ICD-10-CM

## 2019-07-11 DIAGNOSIS — M25.572 ACUTE LEFT ANKLE PAIN: ICD-10-CM

## 2019-07-11 DIAGNOSIS — G57.62 MORTON NEUROMA, LEFT: ICD-10-CM

## 2019-07-11 PROCEDURE — L4361 PNEUMA/VAC WALK BOOT PRE OTS: HCPCS | Performed by: PHYSICIAN ASSISTANT

## 2019-07-11 PROCEDURE — 99214 OFFICE O/P EST MOD 30 MIN: CPT | Performed by: PHYSICIAN ASSISTANT

## 2019-07-12 ENCOUNTER — TELEPHONE (OUTPATIENT)
Dept: ORTHOPEDIC SURGERY | Age: 54
End: 2019-07-12

## 2019-07-12 NOTE — PROGRESS NOTES
consistent with Wang neuroma. Examination of the left ankle demonstrates: There is moderate swelling of the ankle. There is no joint effusion. There is mild tenderness of the lateral malleolus. There is mild tenderness of the medial malleolus. There is no tenderness of the fifth metatarsal.  There is moderate tenderness over the ATFL. There is no tenderness over the proximal fibula. There is no tenderness of the calcaneous. The achilles is intact. Pelletier test negative. There is mild laxity with anterior drawer testing. There is mild laxity with Inversion testing. There is no laxity with Eversion testing. Examination of the lower extremities are intact with sensation to light touch. Motor testing  5/5 in all major motor groups of the lower extremities. Gait is normal heel to toe. Gait is antalgic. Negative Grimes's Sign. SLR negative. Examination of the lower extremities shows intact perfusion to all extremities. No cyanosis. Digits are warm to touch, capillary refill is less than 2 seconds. There is mild edema noted. Examination of the skin over both lower extremities reveals: The skin to be intact without lacerations or abrasions. No significant erythema. No rashes or skin lesions. X Rays: performed in the office today:   AP, Lateral and Oblique of the Left Foot:  Radiographs demonstrate normal bony alignment of the foot. There is no radiographic evidence of a fracture or dislocation. He does have a bipartite sesamoid bones. AP and Oblique Left Ankle: There are no acute fractures or dislocations noted. There are a post traumatic arthritic changes of the ankle. No widening of the medial or lateral mortise. Some increased widening of the medial superior mortise. Ossicles noted over the medial malleolus. All of these findings are consistent with old prior ankle sprains.       Additional Tests reviewed: none  Additional Outside Records reviewed:

## 2019-07-15 ENCOUNTER — TELEPHONE (OUTPATIENT)
Dept: ORTHOPEDIC SURGERY | Age: 54
End: 2019-07-15

## 2019-07-17 ENCOUNTER — OFFICE VISIT (OUTPATIENT)
Dept: ORTHOPEDIC SURGERY | Age: 54
End: 2019-07-17
Payer: COMMERCIAL

## 2019-07-17 DIAGNOSIS — G57.62 MORTON'S NEUROMA OF THIRD INTERSPACE OF LEFT FOOT: Primary | ICD-10-CM

## 2019-07-17 PROCEDURE — 64681 INJECTION TREATMENT OF NERVE: CPT | Performed by: ORTHOPAEDIC SURGERY

## 2019-07-17 PROCEDURE — 99214 OFFICE O/P EST MOD 30 MIN: CPT | Performed by: ORTHOPAEDIC SURGERY

## 2019-07-23 PROBLEM — G57.62 MORTON'S NEUROMA OF THIRD INTERSPACE OF LEFT FOOT: Status: ACTIVE | Noted: 2019-07-23

## 2019-07-25 ENCOUNTER — TELEPHONE (OUTPATIENT)
Dept: FAMILY MEDICINE CLINIC | Age: 54
End: 2019-07-25

## 2019-07-25 DIAGNOSIS — Z00.00 PREVENTATIVE HEALTH CARE: Primary | ICD-10-CM

## 2019-07-25 DIAGNOSIS — Z12.5 SCREENING PSA (PROSTATE SPECIFIC ANTIGEN): ICD-10-CM

## 2019-07-25 NOTE — TELEPHONE ENCOUNTER
Pt requesting lab order to be sent to [facility]  And is requesting a call once complete.  Scheduled for physical on 8/6/19 9:30am

## 2019-07-29 ENCOUNTER — TELEPHONE (OUTPATIENT)
Dept: FAMILY MEDICINE CLINIC | Age: 54
End: 2019-07-29

## 2019-07-29 ENCOUNTER — TELEPHONE (OUTPATIENT)
Dept: ORTHOPEDIC SURGERY | Age: 54
End: 2019-07-29

## 2019-07-29 DIAGNOSIS — M17.12 ARTHRITIS OF LEFT KNEE: ICD-10-CM

## 2019-07-29 RX ORDER — TRAMADOL HYDROCHLORIDE 50 MG/1
50 TABLET ORAL 3 TIMES DAILY PRN
Qty: 20 TABLET | Refills: 0 | Status: SHIPPED | OUTPATIENT
Start: 2019-07-29 | End: 2019-08-23 | Stop reason: SDUPTHER

## 2019-07-29 NOTE — TELEPHONE ENCOUNTER
Patient is still waiting for Labs to be faxed over to [facility] in [address]. Please give patient a call once done.  Please advise

## 2019-08-01 ENCOUNTER — HOSPITAL ENCOUNTER (OUTPATIENT)
Age: 54
Discharge: HOME | End: 2019-08-01
Payer: COMMERCIAL

## 2019-08-01 DIAGNOSIS — Z12.5 SCREENING PSA (PROSTATE SPECIFIC ANTIGEN): ICD-10-CM

## 2019-08-01 DIAGNOSIS — Z00.00 PREVENTATIVE HEALTH CARE: ICD-10-CM

## 2019-08-01 LAB
A/G RATIO: 1.5 (ref 1.1–2.2)
ALBUMIN SERPL-MCNC: 4.2 G/DL (ref 3.4–5)
ALP BLD-CCNC: 92 U/L (ref 40–129)
ALT SERPL-CCNC: 20 U/L (ref 10–40)
ANION GAP SERPL CALCULATED.3IONS-SCNC: 12 MMOL/L (ref 3–16)
AST SERPL-CCNC: 20 U/L (ref 15–37)
BILIRUB SERPL-MCNC: 0.6 MG/DL (ref 0–1)
BUN BLDV-MCNC: 17 MG/DL (ref 7–20)
CALCIUM SERPL-MCNC: 9.2 MG/DL (ref 8.3–10.6)
CHLORIDE BLD-SCNC: 103 MMOL/L (ref 99–110)
CHOLESTEROL, FASTING: 163 MG/DL (ref 0–199)
CO2: 26 MMOL/L (ref 21–32)
CREAT SERPL-MCNC: 0.7 MG/DL (ref 0.9–1.3)
GFR AFRICAN AMERICAN: >60
GFR NON-AFRICAN AMERICAN: >60
GLOBULIN: 2.8 G/DL
GLUCOSE BLD-MCNC: 96 MG/DL (ref 70–99)
HDLC SERPL-MCNC: 48 MG/DL (ref 40–60)
LDL CHOLESTEROL CALCULATED: 102 MG/DL
POTASSIUM SERPL-SCNC: 4.6 MMOL/L (ref 3.5–5.1)
PROSTATE SPECIFIC ANTIGEN: 0.66 NG/ML (ref 0–4)
SODIUM BLD-SCNC: 141 MMOL/L (ref 136–145)
TOTAL PROTEIN: 7 G/DL (ref 6.4–8.2)
TRIGLYCERIDE, FASTING: 65 MG/DL (ref 0–150)
VLDLC SERPL CALC-MCNC: 13 MG/DL

## 2019-08-01 PROCEDURE — 84153 ASSAY OF PSA TOTAL: CPT

## 2019-08-01 PROCEDURE — 36415 COLL VENOUS BLD VENIPUNCTURE: CPT

## 2019-08-01 PROCEDURE — 80061 LIPID PANEL: CPT

## 2019-08-01 PROCEDURE — 80053 COMPREHEN METABOLIC PANEL: CPT

## 2019-08-06 ENCOUNTER — OFFICE VISIT (OUTPATIENT)
Dept: FAMILY MEDICINE CLINIC | Age: 54
End: 2019-08-06
Payer: COMMERCIAL

## 2019-08-06 VITALS
HEART RATE: 75 BPM | WEIGHT: 241.2 LBS | DIASTOLIC BLOOD PRESSURE: 80 MMHG | BODY MASS INDEX: 32.71 KG/M2 | SYSTOLIC BLOOD PRESSURE: 128 MMHG | OXYGEN SATURATION: 98 %

## 2019-08-06 DIAGNOSIS — M25.572 ACUTE LEFT ANKLE PAIN: ICD-10-CM

## 2019-08-06 DIAGNOSIS — G51.0 BELL'S PALSY: ICD-10-CM

## 2019-08-06 DIAGNOSIS — Z00.00 PREVENTATIVE HEALTH CARE: Primary | ICD-10-CM

## 2019-08-06 DIAGNOSIS — M17.12 PRIMARY OSTEOARTHRITIS OF LEFT KNEE: ICD-10-CM

## 2019-08-06 PROCEDURE — 99396 PREV VISIT EST AGE 40-64: CPT | Performed by: PHYSICIAN ASSISTANT

## 2019-08-06 ASSESSMENT — ENCOUNTER SYMPTOMS
SHORTNESS OF BREATH: 0
CHEST TIGHTNESS: 0
EYE PAIN: 0
BACK PAIN: 0
VOICE CHANGE: 0
SORE THROAT: 0
ABDOMINAL PAIN: 0
DIARRHEA: 0
TROUBLE SWALLOWING: 0
CONSTIPATION: 0
COUGH: 0

## 2019-08-15 DIAGNOSIS — G51.0 BELL'S PALSY: Primary | ICD-10-CM

## 2019-08-22 ENCOUNTER — PATIENT MESSAGE (OUTPATIENT)
Dept: FAMILY MEDICINE CLINIC | Age: 54
End: 2019-08-22

## 2019-08-22 DIAGNOSIS — M17.12 ARTHRITIS OF LEFT KNEE: ICD-10-CM

## 2019-08-23 NOTE — TELEPHONE ENCOUNTER
From: Wallace Garcia  To: Dori Voss  Sent: 8/22/2019 11:44 AM EDT  Subject: Prescription Question    Anel Mrs. Viktor Blankenship, can you please refill my prescription for Tramadol?   Thank you  Abilio Claire

## 2019-08-24 RX ORDER — TRAMADOL HYDROCHLORIDE 50 MG/1
50 TABLET ORAL 3 TIMES DAILY PRN
Qty: 60 TABLET | Refills: 1 | Status: SHIPPED | OUTPATIENT
Start: 2019-08-24 | End: 2019-09-23

## 2019-11-04 ENCOUNTER — OFFICE VISIT (OUTPATIENT)
Dept: FAMILY MEDICINE CLINIC | Age: 54
End: 2019-11-04
Payer: COMMERCIAL

## 2019-11-04 VITALS
HEART RATE: 77 BPM | SYSTOLIC BLOOD PRESSURE: 128 MMHG | OXYGEN SATURATION: 99 % | BODY MASS INDEX: 32.9 KG/M2 | DIASTOLIC BLOOD PRESSURE: 78 MMHG | WEIGHT: 242.6 LBS

## 2019-11-04 DIAGNOSIS — M77.8 LEFT WRIST TENDONITIS: ICD-10-CM

## 2019-11-04 DIAGNOSIS — M17.12 PRIMARY OSTEOARTHRITIS OF LEFT KNEE: ICD-10-CM

## 2019-11-04 DIAGNOSIS — M94.262 CHONDROMALACIA OF LEFT KNEE: Primary | ICD-10-CM

## 2019-11-04 PROCEDURE — 99214 OFFICE O/P EST MOD 30 MIN: CPT | Performed by: PHYSICIAN ASSISTANT

## 2019-11-04 RX ORDER — TRAMADOL HYDROCHLORIDE 50 MG/1
50 TABLET ORAL EVERY 8 HOURS PRN
Qty: 90 TABLET | Refills: 1 | Status: SHIPPED | OUTPATIENT
Start: 2019-11-04 | End: 2019-12-04

## 2019-11-04 RX ORDER — TRAMADOL HYDROCHLORIDE 50 MG/1
TABLET ORAL
COMMUNITY
Start: 2019-10-29 | End: 2019-11-04 | Stop reason: SDUPTHER

## 2019-11-04 RX ORDER — PREDNISONE 10 MG/1
TABLET ORAL
Qty: 45 TABLET | Refills: 0 | Status: SHIPPED | OUTPATIENT
Start: 2019-11-04 | End: 2019-11-22 | Stop reason: ALTCHOICE

## 2019-11-04 ASSESSMENT — ENCOUNTER SYMPTOMS
VOMITING: 0
ABDOMINAL PAIN: 0
NAUSEA: 0
BACK PAIN: 0
COUGH: 0
SHORTNESS OF BREATH: 0
CONSTIPATION: 0

## 2019-11-11 ENCOUNTER — OFFICE VISIT (OUTPATIENT)
Dept: ORTHOPEDIC SURGERY | Age: 54
End: 2019-11-11
Payer: COMMERCIAL

## 2019-11-11 VITALS
HEART RATE: 76 BPM | SYSTOLIC BLOOD PRESSURE: 145 MMHG | HEIGHT: 72 IN | BODY MASS INDEX: 32.37 KG/M2 | TEMPERATURE: 98.6 F | WEIGHT: 239 LBS | DIASTOLIC BLOOD PRESSURE: 87 MMHG

## 2019-11-11 DIAGNOSIS — M17.12 ARTHRITIS OF LEFT KNEE: Primary | ICD-10-CM

## 2019-11-11 PROCEDURE — 99214 OFFICE O/P EST MOD 30 MIN: CPT | Performed by: ORTHOPAEDIC SURGERY

## 2019-11-13 ENCOUNTER — TELEPHONE (OUTPATIENT)
Dept: ORTHOPEDIC SURGERY | Age: 54
End: 2019-11-13

## 2019-11-21 ENCOUNTER — TELEPHONE (OUTPATIENT)
Dept: ORTHOPEDICS UNIT | Age: 54
End: 2019-11-21

## 2019-11-22 ENCOUNTER — OFFICE VISIT (OUTPATIENT)
Dept: FAMILY MEDICINE CLINIC | Age: 54
End: 2019-11-22
Payer: COMMERCIAL

## 2019-11-22 VITALS
SYSTOLIC BLOOD PRESSURE: 124 MMHG | OXYGEN SATURATION: 99 % | BODY MASS INDEX: 33.8 KG/M2 | TEMPERATURE: 98.2 F | DIASTOLIC BLOOD PRESSURE: 70 MMHG | HEART RATE: 76 BPM | HEIGHT: 71 IN | WEIGHT: 241.4 LBS

## 2019-11-22 DIAGNOSIS — M17.12 ARTHRITIS OF LEFT KNEE: ICD-10-CM

## 2019-11-22 DIAGNOSIS — Z01.818 PREOP EXAMINATION: Primary | ICD-10-CM

## 2019-11-22 PROCEDURE — 93000 ELECTROCARDIOGRAM COMPLETE: CPT | Performed by: PHYSICIAN ASSISTANT

## 2019-11-22 PROCEDURE — 99242 OFF/OP CONSLTJ NEW/EST SF 20: CPT | Performed by: PHYSICIAN ASSISTANT

## 2019-11-25 ENCOUNTER — PREP FOR PROCEDURE (OUTPATIENT)
Dept: ORTHOPEDIC SURGERY | Age: 54
End: 2019-11-25

## 2019-11-25 DIAGNOSIS — M17.12 ARTHRITIS OF LEFT KNEE: Primary | ICD-10-CM

## 2019-11-27 ENCOUNTER — TELEPHONE (OUTPATIENT)
Dept: ORTHOPEDIC SURGERY | Age: 54
End: 2019-11-27

## 2019-11-27 DIAGNOSIS — M17.12 ARTHRITIS OF LEFT KNEE: Primary | ICD-10-CM

## 2019-11-29 ENCOUNTER — HOSPITAL ENCOUNTER (OUTPATIENT)
Dept: PHYSICAL THERAPY | Age: 54
Setting detail: THERAPIES SERIES
Discharge: HOME | End: 2019-11-29
Payer: COMMERCIAL

## 2019-11-29 PROCEDURE — 97161 PT EVAL LOW COMPLEX 20 MIN: CPT

## 2019-12-03 ENCOUNTER — HOSPITAL ENCOUNTER (OUTPATIENT)
Dept: PREADMISSION TESTING | Age: 54
Discharge: HOME | End: 2019-12-07
Payer: COMMERCIAL

## 2019-12-03 DIAGNOSIS — M17.12 ARTHRITIS OF LEFT KNEE: ICD-10-CM

## 2019-12-03 LAB
ABO/RH: NORMAL
ALBUMIN SERPL-MCNC: 4.1 G/DL (ref 3.4–5)
ANION GAP SERPL CALCULATED.3IONS-SCNC: 12 MMOL/L (ref 3–16)
ANTIBODY SCREEN: NORMAL
APTT: 33 SEC (ref 24.2–36.2)
BASOPHILS ABSOLUTE: 0 K/UL (ref 0–0.2)
BASOPHILS RELATIVE PERCENT: 0.2 %
BILIRUBIN URINE: NEGATIVE
BLOOD, URINE: NEGATIVE
BUN BLDV-MCNC: 13 MG/DL (ref 7–20)
CALCIUM SERPL-MCNC: 9.6 MG/DL (ref 8.3–10.6)
CHLORIDE BLD-SCNC: 103 MMOL/L (ref 99–110)
CLARITY: CLEAR
CO2: 23 MMOL/L (ref 21–32)
COLOR: YELLOW
CREAT SERPL-MCNC: 0.7 MG/DL (ref 0.9–1.3)
EOSINOPHILS ABSOLUTE: 0.1 K/UL (ref 0–0.6)
EOSINOPHILS RELATIVE PERCENT: 0.7 %
ESTIMATED AVERAGE GLUCOSE: 102.5 MG/DL
GFR AFRICAN AMERICAN: >60
GFR NON-AFRICAN AMERICAN: >60
GLUCOSE BLD-MCNC: 152 MG/DL (ref 70–99)
GLUCOSE URINE: NEGATIVE MG/DL
HBA1C MFR BLD: 5.2 %
HCT VFR BLD CALC: 48.2 % (ref 40.5–52.5)
HEMOGLOBIN: 16.3 G/DL (ref 13.5–17.5)
INR BLD: 1.03 (ref 0.86–1.14)
KETONES, URINE: NEGATIVE MG/DL
LEUKOCYTE ESTERASE, URINE: NEGATIVE
LYMPHOCYTES ABSOLUTE: 1.4 K/UL (ref 1–5.1)
LYMPHOCYTES RELATIVE PERCENT: 14.3 %
MCH RBC QN AUTO: 30.2 PG (ref 26–34)
MCHC RBC AUTO-ENTMCNC: 33.7 G/DL (ref 31–36)
MCV RBC AUTO: 89.6 FL (ref 80–100)
MICROSCOPIC EXAMINATION: NORMAL
MONOCYTES ABSOLUTE: 0.6 K/UL (ref 0–1.3)
MONOCYTES RELATIVE PERCENT: 5.9 %
NEUTROPHILS ABSOLUTE: 7.6 K/UL (ref 1.7–7.7)
NEUTROPHILS RELATIVE PERCENT: 78.9 %
NITRITE, URINE: NEGATIVE
PDW BLD-RTO: 14 % (ref 12.4–15.4)
PH UA: 6 (ref 5–8)
PLATELET # BLD: 274 K/UL (ref 135–450)
PMV BLD AUTO: 7.9 FL (ref 5–10.5)
POTASSIUM SERPL-SCNC: 4.2 MMOL/L (ref 3.5–5.1)
PREALBUMIN: 19.6 MG/DL (ref 20–40)
PROTEIN UA: NEGATIVE MG/DL
PROTHROMBIN TIME: 11.9 SEC (ref 10–13.2)
RBC # BLD: 5.38 M/UL (ref 4.2–5.9)
SEDIMENTATION RATE, ERYTHROCYTE: 10 MM/HR (ref 0–20)
SODIUM BLD-SCNC: 138 MMOL/L (ref 136–145)
SPECIFIC GRAVITY UA: 1.02 (ref 1–1.03)
URINE REFLEX TO CULTURE: NORMAL
URINE TYPE: NORMAL
UROBILINOGEN, URINE: 0.2 E.U./DL
VITAMIN D 25-HYDROXY: 22.3 NG/ML
WBC # BLD: 9.7 K/UL (ref 4–11)

## 2019-12-03 PROCEDURE — 85610 PROTHROMBIN TIME: CPT

## 2019-12-03 PROCEDURE — 81003 URINALYSIS AUTO W/O SCOPE: CPT

## 2019-12-03 PROCEDURE — 85025 COMPLETE CBC W/AUTO DIFF WBC: CPT

## 2019-12-03 PROCEDURE — 82306 VITAMIN D 25 HYDROXY: CPT

## 2019-12-03 PROCEDURE — 85652 RBC SED RATE AUTOMATED: CPT

## 2019-12-03 PROCEDURE — 86901 BLOOD TYPING SEROLOGIC RH(D): CPT

## 2019-12-03 PROCEDURE — 85730 THROMBOPLASTIN TIME PARTIAL: CPT

## 2019-12-03 PROCEDURE — 86850 RBC ANTIBODY SCREEN: CPT

## 2019-12-03 PROCEDURE — 87641 MR-STAPH DNA AMP PROBE: CPT

## 2019-12-03 PROCEDURE — 84134 ASSAY OF PREALBUMIN: CPT

## 2019-12-03 PROCEDURE — 82040 ASSAY OF SERUM ALBUMIN: CPT

## 2019-12-03 PROCEDURE — 86900 BLOOD TYPING SEROLOGIC ABO: CPT

## 2019-12-03 PROCEDURE — 80048 BASIC METABOLIC PNL TOTAL CA: CPT

## 2019-12-03 PROCEDURE — 83036 HEMOGLOBIN GLYCOSYLATED A1C: CPT

## 2019-12-04 LAB — MRSA SCREEN RT-PCR: NORMAL

## 2019-12-09 ENCOUNTER — PREP FOR PROCEDURE (OUTPATIENT)
Dept: ORTHOPEDICS UNIT | Age: 54
End: 2019-12-09

## 2019-12-09 RX ORDER — CELECOXIB 200 MG/1
200 CAPSULE ORAL ONCE
Status: CANCELLED | OUTPATIENT
Start: 2019-12-09 | End: 2019-12-09

## 2019-12-09 RX ORDER — OXYCODONE HYDROCHLORIDE 5 MG/1
10 TABLET ORAL ONCE
Status: CANCELLED | OUTPATIENT
Start: 2019-12-09 | End: 2019-12-09

## 2019-12-11 ENCOUNTER — TELEPHONE (OUTPATIENT)
Dept: ORTHOPEDIC SURGERY | Age: 54
End: 2019-12-11

## 2019-12-12 ENCOUNTER — TELEPHONE (OUTPATIENT)
Dept: ORTHOPEDIC SURGERY | Age: 54
End: 2019-12-12

## 2019-12-12 ENCOUNTER — OFFICE VISIT (OUTPATIENT)
Dept: ORTHOPEDIC SURGERY | Age: 54
End: 2019-12-12
Payer: COMMERCIAL

## 2019-12-12 DIAGNOSIS — M17.12 ARTHRITIS OF LEFT KNEE: Primary | ICD-10-CM

## 2019-12-12 PROCEDURE — MISC60 ORTHOTIC REFURBISHMENT 60: Performed by: ORTHOPAEDIC SURGERY

## 2019-12-12 PROCEDURE — 99999 PR OFFICE/OUTPT VISIT,PROCEDURE ONLY: CPT | Performed by: ORTHOPAEDIC SURGERY

## 2019-12-13 ENCOUNTER — OFFICE VISIT (OUTPATIENT)
Dept: ORTHOPEDIC SURGERY | Age: 54
End: 2019-12-13
Payer: COMMERCIAL

## 2019-12-13 VITALS
SYSTOLIC BLOOD PRESSURE: 138 MMHG | BODY MASS INDEX: 32.37 KG/M2 | DIASTOLIC BLOOD PRESSURE: 87 MMHG | HEIGHT: 72 IN | WEIGHT: 239 LBS | HEART RATE: 76 BPM

## 2019-12-13 DIAGNOSIS — M25.832 ULNOCARPAL ABUTMENT SYNDROME, LEFT: Primary | ICD-10-CM

## 2019-12-13 DIAGNOSIS — M17.12 ARTHRITIS OF LEFT KNEE: Primary | ICD-10-CM

## 2019-12-13 DIAGNOSIS — M25.532 LEFT WRIST PAIN: ICD-10-CM

## 2019-12-13 PROCEDURE — MISC60 ORTHOTIC REFURBISHMENT 60: Performed by: ORTHOPAEDIC SURGERY

## 2019-12-13 PROCEDURE — 99214 OFFICE O/P EST MOD 30 MIN: CPT | Performed by: ORTHOPAEDIC SURGERY

## 2019-12-13 PROCEDURE — 20605 DRAIN/INJ JOINT/BURSA W/O US: CPT | Performed by: ORTHOPAEDIC SURGERY

## 2019-12-13 PROCEDURE — L3908 WHO COCK-UP NONMOLDE PRE OTS: HCPCS | Performed by: ORTHOPAEDIC SURGERY

## 2019-12-16 ENCOUNTER — ANESTHESIA EVENT (OUTPATIENT)
Dept: OPERATING ROOM | Age: 54
DRG: 470 | End: 2019-12-16
Payer: COMMERCIAL

## 2019-12-17 ENCOUNTER — ANESTHESIA (OUTPATIENT)
Dept: OPERATING ROOM | Age: 54
DRG: 470 | End: 2019-12-17
Payer: COMMERCIAL

## 2019-12-17 ENCOUNTER — HOSPITAL ENCOUNTER (INPATIENT)
Age: 54
LOS: 1 days | Discharge: HOME HEALTH | DRG: 470 | End: 2019-12-18
Attending: ORTHOPAEDIC SURGERY | Admitting: ORTHOPAEDIC SURGERY
Payer: COMMERCIAL

## 2019-12-17 ENCOUNTER — APPOINTMENT (OUTPATIENT)
Dept: GENERAL RADIOLOGY | Age: 54
DRG: 470 | End: 2019-12-17
Attending: ORTHOPAEDIC SURGERY
Payer: COMMERCIAL

## 2019-12-17 VITALS
DIASTOLIC BLOOD PRESSURE: 89 MMHG | SYSTOLIC BLOOD PRESSURE: 145 MMHG | OXYGEN SATURATION: 100 % | RESPIRATION RATE: 9 BRPM | TEMPERATURE: 97.7 F

## 2019-12-17 DIAGNOSIS — M17.12 PRIMARY OSTEOARTHRITIS OF LEFT KNEE: ICD-10-CM

## 2019-12-17 DIAGNOSIS — M94.262 CHONDROMALACIA OF LEFT KNEE: Primary | ICD-10-CM

## 2019-12-17 LAB
ABO/RH: NORMAL
ANTIBODY SCREEN: NORMAL
GLUCOSE BLD-MCNC: 112 MG/DL (ref 70–99)
GLUCOSE BLD-MCNC: 173 MG/DL (ref 70–99)
PERFORMED ON: ABNORMAL
PERFORMED ON: ABNORMAL

## 2019-12-17 PROCEDURE — 3700000001 HC ADD 15 MINUTES (ANESTHESIA): Performed by: ORTHOPAEDIC SURGERY

## 2019-12-17 PROCEDURE — 2580000003 HC RX 258: Performed by: ORTHOPAEDIC SURGERY

## 2019-12-17 PROCEDURE — 3600000015 HC SURGERY LEVEL 5 ADDTL 15MIN: Performed by: ORTHOPAEDIC SURGERY

## 2019-12-17 PROCEDURE — 88311 DECALCIFY TISSUE: CPT

## 2019-12-17 PROCEDURE — 86901 BLOOD TYPING SEROLOGIC RH(D): CPT

## 2019-12-17 PROCEDURE — 2720000010 HC SURG SUPPLY STERILE: Performed by: ORTHOPAEDIC SURGERY

## 2019-12-17 PROCEDURE — 6360000002 HC RX W HCPCS: Performed by: ANESTHESIOLOGY

## 2019-12-17 PROCEDURE — 8E0Y0CZ ROBOTIC ASSISTED PROCEDURE OF LOWER EXTREMITY, OPEN APPROACH: ICD-10-PCS | Performed by: ORTHOPAEDIC SURGERY

## 2019-12-17 PROCEDURE — 6370000000 HC RX 637 (ALT 250 FOR IP): Performed by: ORTHOPAEDIC SURGERY

## 2019-12-17 PROCEDURE — 86900 BLOOD TYPING SEROLOGIC ABO: CPT

## 2019-12-17 PROCEDURE — 6360000002 HC RX W HCPCS: Performed by: NURSE ANESTHETIST, CERTIFIED REGISTERED

## 2019-12-17 PROCEDURE — 7100000001 HC PACU RECOVERY - ADDTL 15 MIN: Performed by: ORTHOPAEDIC SURGERY

## 2019-12-17 PROCEDURE — 6360000002 HC RX W HCPCS: Performed by: ORTHOPAEDIC SURGERY

## 2019-12-17 PROCEDURE — 2580000003 HC RX 258: Performed by: ANESTHESIOLOGY

## 2019-12-17 PROCEDURE — 97162 PT EVAL MOD COMPLEX 30 MIN: CPT

## 2019-12-17 PROCEDURE — 1200000000 HC SEMI PRIVATE

## 2019-12-17 PROCEDURE — 97530 THERAPEUTIC ACTIVITIES: CPT

## 2019-12-17 PROCEDURE — 2500000003 HC RX 250 WO HCPCS: Performed by: ANESTHESIOLOGY

## 2019-12-17 PROCEDURE — 97535 SELF CARE MNGMENT TRAINING: CPT

## 2019-12-17 PROCEDURE — 86850 RBC ANTIBODY SCREEN: CPT

## 2019-12-17 PROCEDURE — 2500000003 HC RX 250 WO HCPCS: Performed by: NURSE ANESTHETIST, CERTIFIED REGISTERED

## 2019-12-17 PROCEDURE — 2500000003 HC RX 250 WO HCPCS: Performed by: ORTHOPAEDIC SURGERY

## 2019-12-17 PROCEDURE — 73560 X-RAY EXAM OF KNEE 1 OR 2: CPT

## 2019-12-17 PROCEDURE — 2709999900 HC NON-CHARGEABLE SUPPLY: Performed by: ORTHOPAEDIC SURGERY

## 2019-12-17 PROCEDURE — C1713 ANCHOR/SCREW BN/BN,TIS/BN: HCPCS | Performed by: ORTHOPAEDIC SURGERY

## 2019-12-17 PROCEDURE — 3600000005 HC SURGERY LEVEL 5 BASE: Performed by: ORTHOPAEDIC SURGERY

## 2019-12-17 PROCEDURE — 97166 OT EVAL MOD COMPLEX 45 MIN: CPT

## 2019-12-17 PROCEDURE — C1776 JOINT DEVICE (IMPLANTABLE): HCPCS | Performed by: ORTHOPAEDIC SURGERY

## 2019-12-17 PROCEDURE — 3700000000 HC ANESTHESIA ATTENDED CARE: Performed by: ORTHOPAEDIC SURGERY

## 2019-12-17 PROCEDURE — 97116 GAIT TRAINING THERAPY: CPT

## 2019-12-17 PROCEDURE — 88305 TISSUE EXAM BY PATHOLOGIST: CPT

## 2019-12-17 PROCEDURE — 0SRD0J9 REPLACEMENT OF LEFT KNEE JOINT WITH SYNTHETIC SUBSTITUTE, CEMENTED, OPEN APPROACH: ICD-10-PCS | Performed by: ORTHOPAEDIC SURGERY

## 2019-12-17 PROCEDURE — 7100000000 HC PACU RECOVERY - FIRST 15 MIN: Performed by: ORTHOPAEDIC SURGERY

## 2019-12-17 DEVICE — IMPL KNEE TIB INSRT POSTR SZ 6 9MM: Type: IMPLANTABLE DEVICE | Site: KNEE | Status: FUNCTIONAL

## 2019-12-17 DEVICE — CEMENT BNE 40GM HI VISC RADPQ FOR REV SURG: Type: IMPLANTABLE DEVICE | Site: KNEE | Status: FUNCTIONAL

## 2019-12-17 DEVICE — Z DUP USE 2373672 TRITANIUM SYMMETRIC METAL BACKED PATELLA S33X9: Type: IMPLANTABLE DEVICE | Site: KNEE | Status: FUNCTIONAL

## 2019-12-17 DEVICE — BASEPLATE TIB SZ 6 AP52MM ML77MM KNEE TRITANIUM 4 CRUCFRM: Type: IMPLANTABLE DEVICE | Site: KNEE | Status: FUNCTIONAL

## 2019-12-17 DEVICE — IMPLANTABLE DEVICE: Type: IMPLANTABLE DEVICE | Site: KNEE | Status: FUNCTIONAL

## 2019-12-17 RX ORDER — SODIUM CHLORIDE 0.9 % (FLUSH) 0.9 %
10 SYRINGE (ML) INJECTION EVERY 12 HOURS SCHEDULED
Status: DISCONTINUED | OUTPATIENT
Start: 2019-12-17 | End: 2019-12-18 | Stop reason: HOSPADM

## 2019-12-17 RX ORDER — OXYCODONE HYDROCHLORIDE AND ACETAMINOPHEN 5; 325 MG/1; MG/1
1 TABLET ORAL PRN
Status: DISCONTINUED | OUTPATIENT
Start: 2019-12-17 | End: 2019-12-17 | Stop reason: HOSPADM

## 2019-12-17 RX ORDER — OXYCODONE HYDROCHLORIDE 5 MG/1
5 TABLET ORAL EVERY 4 HOURS PRN
Status: DISCONTINUED | OUTPATIENT
Start: 2019-12-17 | End: 2019-12-18 | Stop reason: HOSPADM

## 2019-12-17 RX ORDER — TRAMADOL HYDROCHLORIDE 50 MG/1
50 TABLET ORAL EVERY 6 HOURS PRN
Status: ON HOLD | COMMUNITY
End: 2019-12-17 | Stop reason: HOSPADM

## 2019-12-17 RX ORDER — SODIUM CHLORIDE 9 MG/ML
INJECTION, SOLUTION INTRAVENOUS CONTINUOUS
Status: DISCONTINUED | OUTPATIENT
Start: 2019-12-17 | End: 2019-12-17

## 2019-12-17 RX ORDER — SODIUM CHLORIDE 0.9 % (FLUSH) 0.9 %
10 SYRINGE (ML) INJECTION PRN
Status: DISCONTINUED | OUTPATIENT
Start: 2019-12-17 | End: 2019-12-18 | Stop reason: HOSPADM

## 2019-12-17 RX ORDER — LIDOCAINE HYDROCHLORIDE 20 MG/ML
INJECTION, SOLUTION EPIDURAL; INFILTRATION; INTRACAUDAL; PERINEURAL PRN
Status: DISCONTINUED | OUTPATIENT
Start: 2019-12-17 | End: 2019-12-17 | Stop reason: SDUPTHER

## 2019-12-17 RX ORDER — OXYCODONE HYDROCHLORIDE 5 MG/1
10 TABLET ORAL ONCE
Status: COMPLETED | OUTPATIENT
Start: 2019-12-17 | End: 2019-12-17

## 2019-12-17 RX ORDER — ONDANSETRON 2 MG/ML
4 INJECTION INTRAMUSCULAR; INTRAVENOUS
Status: DISCONTINUED | OUTPATIENT
Start: 2019-12-17 | End: 2019-12-17 | Stop reason: HOSPADM

## 2019-12-17 RX ORDER — ESMOLOL HYDROCHLORIDE 10 MG/ML
INJECTION INTRAVENOUS PRN
Status: DISCONTINUED | OUTPATIENT
Start: 2019-12-17 | End: 2019-12-17 | Stop reason: SDUPTHER

## 2019-12-17 RX ORDER — DOCUSATE SODIUM 100 MG/1
100 CAPSULE, LIQUID FILLED ORAL 2 TIMES DAILY
Status: DISCONTINUED | OUTPATIENT
Start: 2019-12-17 | End: 2019-12-18 | Stop reason: HOSPADM

## 2019-12-17 RX ORDER — PANTOPRAZOLE SODIUM 40 MG/1
40 TABLET, DELAYED RELEASE ORAL DAILY
Status: DISCONTINUED | OUTPATIENT
Start: 2019-12-18 | End: 2019-12-18 | Stop reason: HOSPADM

## 2019-12-17 RX ORDER — SODIUM CHLORIDE 0.9 % (FLUSH) 0.9 %
10 SYRINGE (ML) INJECTION PRN
Status: DISCONTINUED | OUTPATIENT
Start: 2019-12-17 | End: 2019-12-17 | Stop reason: HOSPADM

## 2019-12-17 RX ORDER — PROPOFOL 10 MG/ML
INJECTION, EMULSION INTRAVENOUS PRN
Status: DISCONTINUED | OUTPATIENT
Start: 2019-12-17 | End: 2019-12-17 | Stop reason: SDUPTHER

## 2019-12-17 RX ORDER — OXYCODONE HYDROCHLORIDE 10 MG/1
10 TABLET ORAL EVERY 4 HOURS PRN
Status: DISCONTINUED | OUTPATIENT
Start: 2019-12-17 | End: 2019-12-18 | Stop reason: HOSPADM

## 2019-12-17 RX ORDER — CELECOXIB 200 MG/1
200 CAPSULE ORAL EVERY 24 HOURS
Status: DISCONTINUED | OUTPATIENT
Start: 2019-12-18 | End: 2019-12-18 | Stop reason: HOSPADM

## 2019-12-17 RX ORDER — FENTANYL CITRATE 50 UG/ML
25 INJECTION, SOLUTION INTRAMUSCULAR; INTRAVENOUS EVERY 5 MIN PRN
Status: DISCONTINUED | OUTPATIENT
Start: 2019-12-17 | End: 2019-12-17 | Stop reason: HOSPADM

## 2019-12-17 RX ORDER — CELECOXIB 200 MG/1
200 CAPSULE ORAL ONCE
Status: COMPLETED | OUTPATIENT
Start: 2019-12-17 | End: 2019-12-17

## 2019-12-17 RX ORDER — ONDANSETRON 2 MG/ML
INJECTION INTRAMUSCULAR; INTRAVENOUS PRN
Status: DISCONTINUED | OUTPATIENT
Start: 2019-12-17 | End: 2019-12-17 | Stop reason: SDUPTHER

## 2019-12-17 RX ORDER — ACETAMINOPHEN 325 MG/1
650 TABLET ORAL EVERY 6 HOURS
Status: DISCONTINUED | OUTPATIENT
Start: 2019-12-17 | End: 2019-12-18 | Stop reason: HOSPADM

## 2019-12-17 RX ORDER — DEXTROSE MONOHYDRATE 50 MG/ML
100 INJECTION, SOLUTION INTRAVENOUS PRN
Status: DISCONTINUED | OUTPATIENT
Start: 2019-12-17 | End: 2019-12-18 | Stop reason: HOSPADM

## 2019-12-17 RX ORDER — ONDANSETRON 2 MG/ML
4 INJECTION INTRAMUSCULAR; INTRAVENOUS EVERY 6 HOURS PRN
Status: DISCONTINUED | OUTPATIENT
Start: 2019-12-17 | End: 2019-12-18 | Stop reason: HOSPADM

## 2019-12-17 RX ORDER — ROCURONIUM BROMIDE 10 MG/ML
INJECTION, SOLUTION INTRAVENOUS PRN
Status: DISCONTINUED | OUTPATIENT
Start: 2019-12-17 | End: 2019-12-17 | Stop reason: SDUPTHER

## 2019-12-17 RX ORDER — SODIUM CHLORIDE 0.9 % (FLUSH) 0.9 %
10 SYRINGE (ML) INJECTION EVERY 12 HOURS SCHEDULED
Status: DISCONTINUED | OUTPATIENT
Start: 2019-12-17 | End: 2019-12-17 | Stop reason: HOSPADM

## 2019-12-17 RX ORDER — OXYCODONE HYDROCHLORIDE AND ACETAMINOPHEN 5; 325 MG/1; MG/1
2 TABLET ORAL PRN
Status: DISCONTINUED | OUTPATIENT
Start: 2019-12-17 | End: 2019-12-17 | Stop reason: HOSPADM

## 2019-12-17 RX ORDER — SENNA AND DOCUSATE SODIUM 50; 8.6 MG/1; MG/1
1 TABLET, FILM COATED ORAL 2 TIMES DAILY
Status: DISCONTINUED | OUTPATIENT
Start: 2019-12-17 | End: 2019-12-18 | Stop reason: HOSPADM

## 2019-12-17 RX ORDER — SUCCINYLCHOLINE/SOD CL,ISO/PF 200MG/10ML
SYRINGE (ML) INTRAVENOUS PRN
Status: DISCONTINUED | OUTPATIENT
Start: 2019-12-17 | End: 2019-12-17 | Stop reason: SDUPTHER

## 2019-12-17 RX ORDER — DEXTROSE MONOHYDRATE 25 G/50ML
12.5 INJECTION, SOLUTION INTRAVENOUS PRN
Status: DISCONTINUED | OUTPATIENT
Start: 2019-12-17 | End: 2019-12-18 | Stop reason: HOSPADM

## 2019-12-17 RX ORDER — HYDRALAZINE HYDROCHLORIDE 20 MG/ML
5 INJECTION INTRAMUSCULAR; INTRAVENOUS ONCE
Status: COMPLETED | OUTPATIENT
Start: 2019-12-17 | End: 2019-12-17

## 2019-12-17 RX ORDER — FENTANYL CITRATE 50 UG/ML
INJECTION, SOLUTION INTRAMUSCULAR; INTRAVENOUS PRN
Status: DISCONTINUED | OUTPATIENT
Start: 2019-12-17 | End: 2019-12-17 | Stop reason: SDUPTHER

## 2019-12-17 RX ORDER — MIDAZOLAM HYDROCHLORIDE 1 MG/ML
INJECTION INTRAMUSCULAR; INTRAVENOUS PRN
Status: DISCONTINUED | OUTPATIENT
Start: 2019-12-17 | End: 2019-12-17 | Stop reason: SDUPTHER

## 2019-12-17 RX ORDER — DEXAMETHASONE SODIUM PHOSPHATE 4 MG/ML
INJECTION, SOLUTION INTRA-ARTICULAR; INTRALESIONAL; INTRAMUSCULAR; INTRAVENOUS; SOFT TISSUE PRN
Status: DISCONTINUED | OUTPATIENT
Start: 2019-12-17 | End: 2019-12-17 | Stop reason: SDUPTHER

## 2019-12-17 RX ORDER — LABETALOL HYDROCHLORIDE 5 MG/ML
10 INJECTION, SOLUTION INTRAVENOUS ONCE
Status: COMPLETED | OUTPATIENT
Start: 2019-12-17 | End: 2019-12-17

## 2019-12-17 RX ORDER — ASPIRIN 81 MG/1
81 TABLET ORAL 2 TIMES DAILY
Qty: 28 TABLET | Refills: 0 | Status: SHIPPED | OUTPATIENT
Start: 2019-12-17 | End: 2020-09-02 | Stop reason: ALTCHOICE

## 2019-12-17 RX ORDER — SODIUM CHLORIDE 450 MG/100ML
INJECTION, SOLUTION INTRAVENOUS CONTINUOUS
Status: DISCONTINUED | OUTPATIENT
Start: 2019-12-17 | End: 2019-12-18 | Stop reason: HOSPADM

## 2019-12-17 RX ORDER — MORPHINE SULFATE 4 MG/ML
4 INJECTION, SOLUTION INTRAMUSCULAR; INTRAVENOUS
Status: DISCONTINUED | OUTPATIENT
Start: 2019-12-17 | End: 2019-12-18 | Stop reason: HOSPADM

## 2019-12-17 RX ORDER — NICOTINE POLACRILEX 4 MG
15 LOZENGE BUCCAL PRN
Status: DISCONTINUED | OUTPATIENT
Start: 2019-12-17 | End: 2019-12-18 | Stop reason: HOSPADM

## 2019-12-17 RX ORDER — INSULIN GLARGINE 100 [IU]/ML
0.25 INJECTION, SOLUTION SUBCUTANEOUS NIGHTLY
Status: DISCONTINUED | OUTPATIENT
Start: 2019-12-17 | End: 2019-12-18 | Stop reason: HOSPADM

## 2019-12-17 RX ORDER — OXYCODONE HYDROCHLORIDE 5 MG/1
5-10 TABLET ORAL EVERY 4 HOURS PRN
Qty: 40 TABLET | Refills: 0 | Status: SHIPPED | OUTPATIENT
Start: 2019-12-17 | End: 2019-12-22 | Stop reason: SDUPTHER

## 2019-12-17 RX ADMIN — FENTANYL CITRATE 50 MCG: 50 INJECTION INTRAMUSCULAR; INTRAVENOUS at 11:23

## 2019-12-17 RX ADMIN — OXYCODONE HYDROCHLORIDE 10 MG: 10 TABLET ORAL at 22:13

## 2019-12-17 RX ADMIN — MORPHINE SULFATE 4 MG: 4 INJECTION, SOLUTION INTRAMUSCULAR; INTRAVENOUS at 15:22

## 2019-12-17 RX ADMIN — SENNOSIDES AND DOCUSATE SODIUM 1 TABLET: 8.6; 5 TABLET ORAL at 21:07

## 2019-12-17 RX ADMIN — ESMOLOL HYDROCHLORIDE 30 MG: 100 INJECTION, SOLUTION INTRAVENOUS at 11:34

## 2019-12-17 RX ADMIN — PROPOFOL 200 MG: 10 INJECTION, EMULSION INTRAVENOUS at 10:23

## 2019-12-17 RX ADMIN — DEXAMETHASONE SODIUM PHOSPHATE 8 MG: 4 INJECTION, SOLUTION INTRAMUSCULAR; INTRAVENOUS at 10:40

## 2019-12-17 RX ADMIN — SODIUM CHLORIDE: 9 INJECTION, SOLUTION INTRAVENOUS at 11:49

## 2019-12-17 RX ADMIN — HYDROMORPHONE HYDROCHLORIDE 0.5 MG: 1 INJECTION, SOLUTION INTRAMUSCULAR; INTRAVENOUS; SUBCUTANEOUS at 12:38

## 2019-12-17 RX ADMIN — FENTANYL CITRATE 50 MCG: 50 INJECTION INTRAMUSCULAR; INTRAVENOUS at 10:23

## 2019-12-17 RX ADMIN — Medication 2 G: at 17:24

## 2019-12-17 RX ADMIN — HYDROMORPHONE HYDROCHLORIDE 0.5 MG: 1 INJECTION, SOLUTION INTRAMUSCULAR; INTRAVENOUS; SUBCUTANEOUS at 12:21

## 2019-12-17 RX ADMIN — HYDROMORPHONE HYDROCHLORIDE 0.5 MG: 1 INJECTION, SOLUTION INTRAMUSCULAR; INTRAVENOUS; SUBCUTANEOUS at 12:45

## 2019-12-17 RX ADMIN — ESMOLOL HYDROCHLORIDE 50 MG: 100 INJECTION, SOLUTION INTRAVENOUS at 11:38

## 2019-12-17 RX ADMIN — SODIUM CHLORIDE, PRESERVATIVE FREE 10 ML: 5 INJECTION INTRAVENOUS at 09:30

## 2019-12-17 RX ADMIN — TRANEXAMIC ACID 1000 MG: 1 INJECTION, SOLUTION INTRAVENOUS at 10:27

## 2019-12-17 RX ADMIN — SODIUM CHLORIDE: 9 INJECTION, SOLUTION INTRAVENOUS at 10:11

## 2019-12-17 RX ADMIN — HYDROMORPHONE HYDROCHLORIDE 0.5 MG: 1 INJECTION, SOLUTION INTRAMUSCULAR; INTRAVENOUS; SUBCUTANEOUS at 11:58

## 2019-12-17 RX ADMIN — SODIUM CHLORIDE: 9 INJECTION, SOLUTION INTRAVENOUS at 10:18

## 2019-12-17 RX ADMIN — Medication 100 MG: at 10:23

## 2019-12-17 RX ADMIN — Medication 2 G: at 10:22

## 2019-12-17 RX ADMIN — OXYCODONE HYDROCHLORIDE 10 MG: 5 TABLET ORAL at 10:09

## 2019-12-17 RX ADMIN — PROPOFOL 100 MG: 10 INJECTION, EMULSION INTRAVENOUS at 11:15

## 2019-12-17 RX ADMIN — ACETAMINOPHEN 650 MG: 325 TABLET ORAL at 17:24

## 2019-12-17 RX ADMIN — SODIUM CHLORIDE: 4.5 INJECTION, SOLUTION INTRAVENOUS at 15:13

## 2019-12-17 RX ADMIN — FENTANYL CITRATE 50 MCG: 50 INJECTION INTRAMUSCULAR; INTRAVENOUS at 10:59

## 2019-12-17 RX ADMIN — ONDANSETRON 4 MG: 2 INJECTION INTRAMUSCULAR; INTRAVENOUS at 11:49

## 2019-12-17 RX ADMIN — HYDRALAZINE HYDROCHLORIDE 5 MG: 20 INJECTION INTRAMUSCULAR; INTRAVENOUS at 13:30

## 2019-12-17 RX ADMIN — OXYCODONE HYDROCHLORIDE 10 MG: 10 TABLET ORAL at 14:10

## 2019-12-17 RX ADMIN — ROCURONIUM BROMIDE 40 MG: 10 INJECTION INTRAVENOUS at 10:29

## 2019-12-17 RX ADMIN — DOCUSATE SODIUM 100 MG: 100 CAPSULE, LIQUID FILLED ORAL at 21:07

## 2019-12-17 RX ADMIN — MIDAZOLAM 2 MG: 1 INJECTION INTRAMUSCULAR; INTRAVENOUS at 10:21

## 2019-12-17 RX ADMIN — OXYCODONE HYDROCHLORIDE 10 MG: 10 TABLET ORAL at 18:08

## 2019-12-17 RX ADMIN — MORPHINE SULFATE 4 MG: 4 INJECTION, SOLUTION INTRAMUSCULAR; INTRAVENOUS at 19:47

## 2019-12-17 RX ADMIN — SUGAMMADEX 200 MG: 100 INJECTION, SOLUTION INTRAVENOUS at 11:49

## 2019-12-17 RX ADMIN — FENTANYL CITRATE 25 MCG: 50 INJECTION, SOLUTION INTRAMUSCULAR; INTRAVENOUS at 13:03

## 2019-12-17 RX ADMIN — PROPOFOL 100 MG: 10 INJECTION, EMULSION INTRAVENOUS at 11:10

## 2019-12-17 RX ADMIN — LABETALOL HYDROCHLORIDE 10 MG: 5 INJECTION INTRAVENOUS at 13:01

## 2019-12-17 RX ADMIN — CELECOXIB 200 MG: 200 CAPSULE ORAL at 10:10

## 2019-12-17 RX ADMIN — LIDOCAINE HYDROCHLORIDE 50 MG: 20 INJECTION, SOLUTION EPIDURAL; INFILTRATION; INTRACAUDAL; PERINEURAL at 10:23

## 2019-12-17 RX ADMIN — FENTANYL CITRATE 25 MCG: 50 INJECTION, SOLUTION INTRAMUSCULAR; INTRAVENOUS at 13:22

## 2019-12-17 RX ADMIN — HYDROMORPHONE HYDROCHLORIDE 0.5 MG: 1 INJECTION, SOLUTION INTRAMUSCULAR; INTRAVENOUS; SUBCUTANEOUS at 12:27

## 2019-12-17 RX ADMIN — HYDROMORPHONE HYDROCHLORIDE 0.5 MG: 1 INJECTION, SOLUTION INTRAMUSCULAR; INTRAVENOUS; SUBCUTANEOUS at 11:51

## 2019-12-17 RX ADMIN — FENTANYL CITRATE 50 MCG: 50 INJECTION INTRAMUSCULAR; INTRAVENOUS at 10:49

## 2019-12-17 ASSESSMENT — PAIN DESCRIPTION - PROGRESSION
CLINICAL_PROGRESSION: GRADUALLY WORSENING
CLINICAL_PROGRESSION: GRADUALLY IMPROVING
CLINICAL_PROGRESSION: GRADUALLY WORSENING
CLINICAL_PROGRESSION: GRADUALLY WORSENING
CLINICAL_PROGRESSION: NOT CHANGED
CLINICAL_PROGRESSION: GRADUALLY IMPROVING
CLINICAL_PROGRESSION: NOT CHANGED
CLINICAL_PROGRESSION: GRADUALLY IMPROVING
CLINICAL_PROGRESSION: NOT CHANGED
CLINICAL_PROGRESSION: NOT CHANGED
CLINICAL_PROGRESSION: GRADUALLY WORSENING
CLINICAL_PROGRESSION: NOT CHANGED
CLINICAL_PROGRESSION: NOT CHANGED
CLINICAL_PROGRESSION: GRADUALLY IMPROVING

## 2019-12-17 ASSESSMENT — PAIN - FUNCTIONAL ASSESSMENT
PAIN_FUNCTIONAL_ASSESSMENT: PREVENTS OR INTERFERES SOME ACTIVE ACTIVITIES AND ADLS
PAIN_FUNCTIONAL_ASSESSMENT: ACTIVITIES ARE NOT PREVENTED
PAIN_FUNCTIONAL_ASSESSMENT: PREVENTS OR INTERFERES SOME ACTIVE ACTIVITIES AND ADLS
PAIN_FUNCTIONAL_ASSESSMENT: PREVENTS OR INTERFERES SOME ACTIVE ACTIVITIES AND ADLS

## 2019-12-17 ASSESSMENT — PAIN DESCRIPTION - ONSET
ONSET: ON-GOING
ONSET: GRADUAL
ONSET: ON-GOING
ONSET: ON-GOING

## 2019-12-17 ASSESSMENT — PULMONARY FUNCTION TESTS
PIF_VALUE: 19
PIF_VALUE: 21
PIF_VALUE: 17
PIF_VALUE: 17
PIF_VALUE: 19
PIF_VALUE: 17
PIF_VALUE: 17
PIF_VALUE: 18
PIF_VALUE: 18
PIF_VALUE: 1
PIF_VALUE: 17
PIF_VALUE: 17
PIF_VALUE: 3
PIF_VALUE: 18
PIF_VALUE: 17
PIF_VALUE: 16
PIF_VALUE: 17
PIF_VALUE: 18
PIF_VALUE: 18
PIF_VALUE: 17
PIF_VALUE: 18
PIF_VALUE: 2
PIF_VALUE: 17
PIF_VALUE: 17
PIF_VALUE: 18
PIF_VALUE: 19
PIF_VALUE: 0
PIF_VALUE: 18
PIF_VALUE: 17
PIF_VALUE: 16
PIF_VALUE: 18
PIF_VALUE: 17
PIF_VALUE: 17
PIF_VALUE: 19
PIF_VALUE: 17
PIF_VALUE: 18
PIF_VALUE: 17
PIF_VALUE: 0
PIF_VALUE: 18
PIF_VALUE: 17
PIF_VALUE: 1
PIF_VALUE: 18
PIF_VALUE: 17
PIF_VALUE: 18
PIF_VALUE: 18
PIF_VALUE: 17
PIF_VALUE: 16
PIF_VALUE: 16
PIF_VALUE: 19
PIF_VALUE: 18
PIF_VALUE: 17
PIF_VALUE: 16
PIF_VALUE: 18
PIF_VALUE: 16
PIF_VALUE: 18
PIF_VALUE: 17
PIF_VALUE: 0
PIF_VALUE: 4
PIF_VALUE: 18
PIF_VALUE: 16
PIF_VALUE: 16
PIF_VALUE: 17
PIF_VALUE: 19
PIF_VALUE: 18
PIF_VALUE: 18
PIF_VALUE: 20
PIF_VALUE: 17
PIF_VALUE: 19
PIF_VALUE: 18
PIF_VALUE: 17
PIF_VALUE: 18
PIF_VALUE: 18
PIF_VALUE: 16
PIF_VALUE: 17
PIF_VALUE: 18
PIF_VALUE: 16
PIF_VALUE: 6
PIF_VALUE: 18
PIF_VALUE: 17
PIF_VALUE: 18
PIF_VALUE: 19
PIF_VALUE: 17
PIF_VALUE: 17
PIF_VALUE: 5
PIF_VALUE: 16
PIF_VALUE: 17
PIF_VALUE: 6

## 2019-12-17 ASSESSMENT — PAIN DESCRIPTION - FREQUENCY
FREQUENCY: CONTINUOUS
FREQUENCY: INTERMITTENT
FREQUENCY: CONTINUOUS

## 2019-12-17 ASSESSMENT — PAIN DESCRIPTION - DESCRIPTORS
DESCRIPTORS: ACHING;THROBBING
DESCRIPTORS: ACHING
DESCRIPTORS: ACHING;THROBBING
DESCRIPTORS: ACHING
DESCRIPTORS: ACHING
DESCRIPTORS: ACHING;THROBBING
DESCRIPTORS: ACHING
DESCRIPTORS: PATIENT UNABLE TO DESCRIBE
DESCRIPTORS: ACHING
DESCRIPTORS: PATIENT UNABLE TO DESCRIBE
DESCRIPTORS: ACHING
DESCRIPTORS: PATIENT UNABLE TO DESCRIBE

## 2019-12-17 ASSESSMENT — PAIN DESCRIPTION - LOCATION
LOCATION: KNEE
LOCATION: GENERALIZED
LOCATION: KNEE

## 2019-12-17 ASSESSMENT — PAIN DESCRIPTION - ORIENTATION
ORIENTATION: LEFT

## 2019-12-17 ASSESSMENT — PAIN SCALES - GENERAL
PAINLEVEL_OUTOF10: 8
PAINLEVEL_OUTOF10: 7
PAINLEVEL_OUTOF10: 7
PAINLEVEL_OUTOF10: 8
PAINLEVEL_OUTOF10: 6
PAINLEVEL_OUTOF10: 8
PAINLEVEL_OUTOF10: 0
PAINLEVEL_OUTOF10: 7
PAINLEVEL_OUTOF10: 10
PAINLEVEL_OUTOF10: 9
PAINLEVEL_OUTOF10: 7
PAINLEVEL_OUTOF10: 6
PAINLEVEL_OUTOF10: 2
PAINLEVEL_OUTOF10: 6
PAINLEVEL_OUTOF10: 0
PAINLEVEL_OUTOF10: 7
PAINLEVEL_OUTOF10: 10
PAINLEVEL_OUTOF10: 6
PAINLEVEL_OUTOF10: 6
PAINLEVEL_OUTOF10: 0
PAINLEVEL_OUTOF10: 5
PAINLEVEL_OUTOF10: 9

## 2019-12-17 ASSESSMENT — PAIN DESCRIPTION - PAIN TYPE
TYPE: SURGICAL PAIN
TYPE: CHRONIC PAIN
TYPE: SURGICAL PAIN
TYPE: SURGICAL PAIN
TYPE: ACUTE PAIN;SURGICAL PAIN
TYPE: SURGICAL PAIN
TYPE: ACUTE PAIN;SURGICAL PAIN
TYPE: SURGICAL PAIN
TYPE: SURGICAL PAIN
TYPE: ACUTE PAIN;SURGICAL PAIN
TYPE: SURGICAL PAIN

## 2019-12-18 ENCOUNTER — TELEPHONE (OUTPATIENT)
Dept: ORTHOPEDIC SURGERY | Age: 54
End: 2019-12-18

## 2019-12-18 VITALS
BODY MASS INDEX: 32.88 KG/M2 | WEIGHT: 242.73 LBS | HEIGHT: 72 IN | DIASTOLIC BLOOD PRESSURE: 88 MMHG | HEART RATE: 73 BPM | SYSTOLIC BLOOD PRESSURE: 164 MMHG | OXYGEN SATURATION: 97 % | RESPIRATION RATE: 18 BRPM | TEMPERATURE: 97.8 F

## 2019-12-18 LAB
ESTIMATED AVERAGE GLUCOSE: 105.4 MG/DL
GLUCOSE BLD-MCNC: 148 MG/DL (ref 70–99)
HBA1C MFR BLD: 5.3 %
HCT VFR BLD CALC: 43.3 % (ref 40.5–52.5)
HEMOGLOBIN: 14.8 G/DL (ref 13.5–17.5)
PERFORMED ON: ABNORMAL

## 2019-12-18 PROCEDURE — 97110 THERAPEUTIC EXERCISES: CPT

## 2019-12-18 PROCEDURE — 85018 HEMOGLOBIN: CPT

## 2019-12-18 PROCEDURE — 83036 HEMOGLOBIN GLYCOSYLATED A1C: CPT

## 2019-12-18 PROCEDURE — 6360000002 HC RX W HCPCS: Performed by: ORTHOPAEDIC SURGERY

## 2019-12-18 PROCEDURE — 2580000003 HC RX 258: Performed by: ORTHOPAEDIC SURGERY

## 2019-12-18 PROCEDURE — 85014 HEMATOCRIT: CPT

## 2019-12-18 PROCEDURE — 97530 THERAPEUTIC ACTIVITIES: CPT

## 2019-12-18 PROCEDURE — 97535 SELF CARE MNGMENT TRAINING: CPT

## 2019-12-18 PROCEDURE — 97116 GAIT TRAINING THERAPY: CPT

## 2019-12-18 PROCEDURE — 6370000000 HC RX 637 (ALT 250 FOR IP): Performed by: NURSE PRACTITIONER

## 2019-12-18 PROCEDURE — 6370000000 HC RX 637 (ALT 250 FOR IP): Performed by: ORTHOPAEDIC SURGERY

## 2019-12-18 PROCEDURE — 36415 COLL VENOUS BLD VENIPUNCTURE: CPT

## 2019-12-18 RX ORDER — MORPHINE SULFATE 15 MG/1
15 TABLET, FILM COATED, EXTENDED RELEASE ORAL EVERY 12 HOURS SCHEDULED
Qty: 10 TABLET | Refills: 0 | Status: SHIPPED | OUTPATIENT
Start: 2019-12-18 | End: 2019-12-23

## 2019-12-18 RX ORDER — MORPHINE SULFATE 15 MG/1
15 TABLET, FILM COATED, EXTENDED RELEASE ORAL EVERY 12 HOURS SCHEDULED
Status: DISCONTINUED | OUTPATIENT
Start: 2019-12-18 | End: 2019-12-18 | Stop reason: HOSPADM

## 2019-12-18 RX ADMIN — MORPHINE SULFATE 4 MG: 4 INJECTION, SOLUTION INTRAMUSCULAR; INTRAVENOUS at 04:41

## 2019-12-18 RX ADMIN — ENOXAPARIN SODIUM 30 MG: 30 INJECTION SUBCUTANEOUS at 06:26

## 2019-12-18 RX ADMIN — OXYCODONE HYDROCHLORIDE 10 MG: 10 TABLET ORAL at 06:25

## 2019-12-18 RX ADMIN — DOCUSATE SODIUM 100 MG: 100 CAPSULE, LIQUID FILLED ORAL at 09:49

## 2019-12-18 RX ADMIN — OXYCODONE HYDROCHLORIDE 10 MG: 10 TABLET ORAL at 10:06

## 2019-12-18 RX ADMIN — ACETAMINOPHEN 650 MG: 325 TABLET ORAL at 06:25

## 2019-12-18 RX ADMIN — CELECOXIB 200 MG: 200 CAPSULE ORAL at 06:25

## 2019-12-18 RX ADMIN — Medication 10 ML: at 09:53

## 2019-12-18 RX ADMIN — MORPHINE SULFATE 15 MG: 15 TABLET, EXTENDED RELEASE ORAL at 10:06

## 2019-12-18 RX ADMIN — INSULIN LISPRO 1 UNITS: 100 INJECTION, SOLUTION INTRAVENOUS; SUBCUTANEOUS at 09:51

## 2019-12-18 RX ADMIN — PANTOPRAZOLE SODIUM 40 MG: 40 TABLET, DELAYED RELEASE ORAL at 09:49

## 2019-12-18 RX ADMIN — SENNOSIDES AND DOCUSATE SODIUM 1 TABLET: 8.6; 5 TABLET ORAL at 09:49

## 2019-12-18 RX ADMIN — OXYCODONE HYDROCHLORIDE 10 MG: 10 TABLET ORAL at 02:11

## 2019-12-18 RX ADMIN — ACETAMINOPHEN 650 MG: 325 TABLET ORAL at 00:38

## 2019-12-18 RX ADMIN — MORPHINE SULFATE 4 MG: 4 INJECTION, SOLUTION INTRAMUSCULAR; INTRAVENOUS at 00:38

## 2019-12-18 RX ADMIN — INSULIN LISPRO 9 UNITS: 100 INJECTION, SOLUTION INTRAVENOUS; SUBCUTANEOUS at 09:52

## 2019-12-18 RX ADMIN — Medication 2 G: at 02:12

## 2019-12-18 ASSESSMENT — PAIN DESCRIPTION - PAIN TYPE
TYPE: SURGICAL PAIN
TYPE: SURGICAL PAIN
TYPE: ACUTE PAIN;SURGICAL PAIN
TYPE: SURGICAL PAIN
TYPE: ACUTE PAIN;SURGICAL PAIN
TYPE: ACUTE PAIN;SURGICAL PAIN

## 2019-12-18 ASSESSMENT — PAIN DESCRIPTION - FREQUENCY
FREQUENCY: CONTINUOUS

## 2019-12-18 ASSESSMENT — PAIN - FUNCTIONAL ASSESSMENT
PAIN_FUNCTIONAL_ASSESSMENT: PREVENTS OR INTERFERES SOME ACTIVE ACTIVITIES AND ADLS

## 2019-12-18 ASSESSMENT — PAIN DESCRIPTION - ORIENTATION
ORIENTATION: LEFT

## 2019-12-18 ASSESSMENT — PAIN SCALES - GENERAL
PAINLEVEL_OUTOF10: 8
PAINLEVEL_OUTOF10: 7
PAINLEVEL_OUTOF10: 5
PAINLEVEL_OUTOF10: 5
PAINLEVEL_OUTOF10: 8
PAINLEVEL_OUTOF10: 8
PAINLEVEL_OUTOF10: 7
PAINLEVEL_OUTOF10: 10
PAINLEVEL_OUTOF10: 0

## 2019-12-18 ASSESSMENT — PAIN DESCRIPTION - LOCATION
LOCATION: KNEE

## 2019-12-18 ASSESSMENT — PAIN DESCRIPTION - ONSET
ONSET: ON-GOING

## 2019-12-18 ASSESSMENT — PAIN DESCRIPTION - PROGRESSION
CLINICAL_PROGRESSION: NOT CHANGED
CLINICAL_PROGRESSION: GRADUALLY IMPROVING
CLINICAL_PROGRESSION: GRADUALLY IMPROVING
CLINICAL_PROGRESSION: GRADUALLY WORSENING
CLINICAL_PROGRESSION: NOT CHANGED

## 2019-12-18 ASSESSMENT — PAIN DESCRIPTION - DESCRIPTORS
DESCRIPTORS: ACHING;THROBBING
DESCRIPTORS: ACHING;THROBBING
DESCRIPTORS: CONSTANT
DESCRIPTORS: CONSTANT
DESCRIPTORS: ACHING;THROBBING

## 2019-12-19 ENCOUNTER — TELEPHONE (OUTPATIENT)
Dept: ORTHOPEDIC SURGERY | Age: 54
End: 2019-12-19
Payer: COMMERCIAL

## 2019-12-19 ENCOUNTER — TELEPHONE (OUTPATIENT)
Dept: FAMILY MEDICINE CLINIC | Age: 54
End: 2019-12-19

## 2019-12-19 PROCEDURE — MISC90 ORTHOTIC REFURBISHMENT 90: Performed by: ORTHOPAEDIC SURGERY

## 2019-12-20 ENCOUNTER — TELEPHONE (OUTPATIENT)
Dept: ORTHOPEDIC SURGERY | Age: 54
End: 2019-12-20

## 2019-12-20 DIAGNOSIS — M94.262 CHONDROMALACIA OF LEFT KNEE: ICD-10-CM

## 2019-12-20 DIAGNOSIS — M17.12 PRIMARY OSTEOARTHRITIS OF LEFT KNEE: ICD-10-CM

## 2019-12-20 DIAGNOSIS — Z96.652 STATUS POST TOTAL LEFT KNEE REPLACEMENT: Primary | ICD-10-CM

## 2019-12-20 PROCEDURE — MISC60 ORTHOTIC REFURBISHMENT 60: Performed by: ORTHOPAEDIC SURGERY

## 2019-12-22 RX ORDER — OXYCODONE HYDROCHLORIDE 5 MG/1
5-10 TABLET ORAL EVERY 4 HOURS PRN
Qty: 40 TABLET | Refills: 0 | Status: SHIPPED | OUTPATIENT
Start: 2019-12-22 | End: 2019-12-26 | Stop reason: SDUPTHER

## 2019-12-23 ENCOUNTER — TELEPHONE (OUTPATIENT)
Dept: ORTHOPEDICS UNIT | Age: 54
End: 2019-12-23

## 2019-12-26 ENCOUNTER — TELEPHONE (OUTPATIENT)
Dept: ORTHOPEDIC SURGERY | Age: 54
End: 2019-12-26

## 2019-12-26 DIAGNOSIS — M17.12 PRIMARY OSTEOARTHRITIS OF LEFT KNEE: ICD-10-CM

## 2019-12-26 DIAGNOSIS — M94.262 CHONDROMALACIA OF LEFT KNEE: ICD-10-CM

## 2019-12-26 RX ORDER — OXYCODONE HYDROCHLORIDE 5 MG/1
5-10 TABLET ORAL EVERY 4 HOURS PRN
Qty: 40 TABLET | Refills: 0 | Status: SHIPPED | OUTPATIENT
Start: 2019-12-26 | End: 2020-01-02 | Stop reason: SDUPTHER

## 2020-01-02 ENCOUNTER — OFFICE VISIT (OUTPATIENT)
Dept: ORTHOPEDIC SURGERY | Age: 55
End: 2020-01-02

## 2020-01-02 ENCOUNTER — HOSPITAL ENCOUNTER (OUTPATIENT)
Dept: PHYSICAL THERAPY | Age: 55
Setting detail: THERAPIES SERIES
Discharge: HOME | End: 2020-01-02
Payer: COMMERCIAL

## 2020-01-02 VITALS — TEMPERATURE: 98 F | BODY MASS INDEX: 32.76 KG/M2 | HEIGHT: 71 IN | WEIGHT: 234 LBS

## 2020-01-02 PROBLEM — Z96.652 HISTORY OF TOTAL KNEE REPLACEMENT, LEFT: Status: ACTIVE | Noted: 2020-01-02

## 2020-01-02 PROCEDURE — 97164 PT RE-EVAL EST PLAN CARE: CPT

## 2020-01-02 PROCEDURE — 97530 THERAPEUTIC ACTIVITIES: CPT

## 2020-01-02 PROCEDURE — 97110 THERAPEUTIC EXERCISES: CPT

## 2020-01-02 PROCEDURE — 97140 MANUAL THERAPY 1/> REGIONS: CPT

## 2020-01-02 PROCEDURE — APPSS15 APP SPLIT SHARED TIME 0-15 MINUTES: Performed by: PHYSICIAN ASSISTANT

## 2020-01-02 PROCEDURE — 99024 POSTOP FOLLOW-UP VISIT: CPT | Performed by: PHYSICIAN ASSISTANT

## 2020-01-02 RX ORDER — OXYCODONE HYDROCHLORIDE 5 MG/1
5 TABLET ORAL EVERY 4 HOURS PRN
Qty: 40 TABLET | Refills: 0 | Status: SHIPPED | OUTPATIENT
Start: 2020-01-02 | End: 2020-01-13 | Stop reason: SDUPTHER

## 2020-01-02 NOTE — FLOWSHEET NOTE
with Physician:      Progress Report: [x]  Yes post-op eval  []  No     Date Range for reporting period:  Beginnin20  Ending: re-eval on 20    Progress report due (10 Rx/or 30 days whichever is less):     Recertification due (POC duration/ or 90 days whichever is less):    Visit # Insurance Allowable Auth required? Date Range   1 + re-eval preauthorize [x]  Yes  []  No To be determined     Latex Allergy:  [x]NO      []YES  Preferred Language for Healthcare:   [x]English       []other:    Functional Scale: LEFS: 41/40-59% LOF  Date assessed:19    Pain level:  5/10     SUBJECTIVE:  Patient returns to PT after undergoing a L TKR on 19. He states he finished with home health PT on Tuesday. He states he was somewhere in the 90's for bending and almost to straight . He presents walking with a SPC and when questioned if home PT advised him to do this he stated she did not mention for him to do that but he thought it would be easier getting in/out of the car with a SPC.   OBJECTIVE: : Test Measurements:L HS -38 L quad 60, L gastroc 2; see Table below for other measurements: gait with SPC : decreased L LE weight-bearing with foot flat initial contact and L LE hip/knee lateral rotation; in supine L lE in Hip lateral rotation with moderate to severe Lateral HS and piriformis tightness  Functional testing Pre hab        Date 19 Re eval post op   Date :20 4 week f/u   Date  8 week f/u  Date  D/c            TUG 5.98\" 14.85\"      30 second sit to stand 11 6      6 minute walk 466.04 m 298.98 m      Balance        Narrow BLAINE 10 10      Semi tandem 10 10      Tandem  L 6.71\"  R 3.88\" 1.7\"      SLS 10 0      Knee AROM L/R 0-140 L 10-90      Knee Extension MMT R/L L 5   R 4 L 4-/5   R       Hip aBduction MMT R/L L 3+  R 4 L 4-/5       LEFS 41;  20; 60-79%                    RESTRICTIONS/PRECAUTIONS: R TKR 3/2019    Exercises/Interventions:     Therapeutic Exercise (95824)  Resistance / level Sets/sec Reps Notes / Cues   Supine gluteal isos Verbally educated on HEP as patient had to leave as wife had to go to hospital for possible [place]   HEP   Supine quad isos  5\" 10 HEP   Supine knee extension prop  5\" 5 HEP   Ankle pumps    HEP   Supine assisted Knee flexion  5\" 10 HEP   SLR flexion  2/5\" 10 HEP; min A by PT to avoid quad lag initially   Long sit HS stretch  30\" 3 L HEP   Towel gastro stretch  20\" 2 L HEP   Quad stretch off EOB  30\" 3 L HEP   Standing TKE  1 10 L AROM HEP   Therapeutic Activities (48382)       11/29/19 Pt was educated on PT POC, Diagnosis, Prognosis, pathomechanics as well as frequency and duration of scheduling future physical therapy appointments.  Time was also taken on this day to answer all patient questions and participation in PT.     1/2/2020:  Og Royal' on need to return to RW until L quad is strong and he has nearly full knee extension, instrtucted to stop sleeping with pillow behind his knee and to prebemt his l leg from rolling outward                          Neuromuscular Re-ed (85706)                            Manual Intervention (05864)       Knee mobs/PROM  PROM KNee flexion 5\" X8     Tib/Fem Mobs       Patella Mobs  Superior glides Grade II followd with PROM knee extension 5\" X10 each     Ankle mobs       Muscle manual stretches L HS ; L Lateral HS, L piriformis 30\" 2 each L lE               Pt. Education:  -pt educated on diagnosis, prognosis and expectations for rehab  -pt provided with written and illustrated instructions for HEP  -all pt questions were answered    Therapeutic Exercise and NMR EXR  [] (41661) Provided verbal/tactile cueing for activities related to strengthening, flexibility, endurance, ROM for improvements in LE, proximal hip, and core control with self care, mobility, lifting, ambulation.  [] (37474) Provided verbal/tactile cueing for activities related to improving balance, coordination, kinesthetic sense, posture, motor skill, proprioception  to assist with LE, proximal hip, and core control in self care, mobility, lifting, ambulation and eccentric single leg control. NMR and Therapeutic Activities:    [x] (86905 or 92145) Provided verbal/tactile cueing for activities related to improving balance, coordination, kinesthetic sense, posture, motor skill, proprioception and motor activation to allow for proper function of core, proximal hip and LE with self care and ADLs  [] (96591) Gait Re-education- Provided training and instruction to the patient for proper LE, core and proximal hip recruitment and positioning and eccentric body weight control with ambulation re-education including up and down stairs     Home Exercise Program:    [x] (66344) Reviewed/Progressed HEP activities related to strengthening, flexibility, endurance, ROM of core, proximal hip and LE for functional self-care, mobility, lifting and ambulation/stair navigation   [] (81189)Reviewed/Progressed HEP activities related to improving balance, coordination, kinesthetic sense, posture, motor skill, proprioception of core, proximal hip and LE for self care, mobility, lifting, and ambulation/stair navigation      Manual Treatments:  PROM / STM / Oscillations-Mobs:  G-I, II, III, IV (PA's, Inf., Post.)  [] (77208) Provided manual therapy to mobilize LE, proximal hip and/or LS spine soft tissue/joints for the purpose of modulating pain, promoting relaxation,  increasing ROM, reducing/eliminating soft tissue swelling/inflammation/restriction, improving soft tissue extensibility and allowing for proper ROM for normal function with self care, mobility, lifting and ambulation.      Modalities:  [] (39612) Vasopneumatic compression: Utilized vasopneumatic compression to decrease edema / swelling for the purpose of improving mobility and quad tone / recruitment which will allow for increased overall function including but not limited to self-care, transfers, ambulation, and ascending return to functional activities including amb. Up/down ramp without increased symptoms or restriction. [] Progressing: [] Met: [] Not Met: [] Adjusted  5. Patient to return to work with [facility] and able to work a full schedule with pain 2/10 or less by discharge. [] Progressing: [] Met: [] Not Met: [] Adjusted       Overall Progression Towards Functional goals/ Treatment Progress Update:  [] Patient is progressing as expected towards functional goals listed. [] Progression is slowed due to complexities/Impairments listed. [] Progression has been slowed due to co-morbidities. [x] Plan just implemented, too soon to assess goals progression <30days   [] Goals require adjustment due to lack of progress  [] Patient is not progressing as expected and requires additional follow up with physician  [] Other    Persisting Functional Limitations/Impairments:  []Sitting [x]Standing   [x]Walking [x]Stairs   []Transfers []ADLs   [x]Squatting/bending [x]Kneeling  []Housework [x]Job related tasks  []Driving [x]Sports/Recreation   []Sleeping []Other:    ASSESSMENT:  Patient has limited L knee ROM especially into L knee extension with decreased flexibility in all L LE musculature. Patient has a positive L quad lag and due to weakness in quad was instructed to return to his RW at this time until further directed. Patient has decreased functional L LE strength which limits safe ambulation in the community as demonstrated by his TUG score . 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   Treatment/Activity Tolerance:  [x] Pt able to complete treatment [] Patient limited by fatique  [] Patient limited by pain  [] Patient limited by other medical complications  [] Other:     Prognosis:  [x] Good [] Fair  [] Poor    Patient Requires Follow-up: [x] Yes  [] No    Return to Play:    [x]

## 2020-01-03 NOTE — PROGRESS NOTES
Occupational History    Not on file   Tobacco Use    Smoking status: Never Smoker    Smokeless tobacco: Never Used   Substance and Sexual Activity    Alcohol use: Yes     Alcohol/week: 6.0 standard drinks     Types: 6 Cans of beer per week     Comment: socially    Drug use: Never    Sexual activity: Yes     Partners: Female       Current Outpatient Medications   Medication Sig Dispense Refill    oxyCODONE (ROXICODONE) 5 MG immediate release tablet Take 1 tablet by mouth every 4 hours as needed for Pain for up to 7 days. 40 tablet 0    aspirin EC 81 MG EC tablet Take 1 tablet by mouth 2 times daily for 14 days Take for DVT blood clot prophylaxis. Please avoid missing doses. 28 tablet 0    pantoprazole (PROTONIX) 40 MG tablet Take 40 mg by mouth daily       No current facility-administered medications for this visit. Objective:   He is alert, oriented x 3, pleasant, well nourished, developed and in no acute distress. Temp 98 °F (36.7 °C) (Temporal)   Ht 5' 11\" (1.803 m)   Wt 234 lb (106.1 kg)   BMI 32.64 kg/m²      KNEE EXAM:  Examination of the left knee shows: There is no erythema. There is no drainage. There is mild soft tissue swelling. AROM -  Extension 3             -   Flexion  105  Extensor Mechanism is intact. Calf is not swollen. X Rays: performed in the office today:     AP, Lateral and Sunrise  left Knee: There is a prosthetic total knee arthroplasty present. The alignment is satisfactory. There are no fractures. Diagnosis:       ICD-10-CM    1. History of total knee replacement, left 12/17/2019 Z96.652 XR KNEE LEFT (3 VIEWS)     Ambulatory referral to Physical Therapy   2. Chondromalacia of left knee M94.262 oxyCODONE (ROXICODONE) 5 MG immediate release tablet   3.  Primary osteoarthritis of left knee M17.12 oxyCODONE (ROXICODONE) 5 MG immediate release tablet        Assessment/plan:    Assessment/plan:     Clinically and radiographically stable left knee

## 2020-01-07 ENCOUNTER — HOSPITAL ENCOUNTER (OUTPATIENT)
Dept: PHYSICAL THERAPY | Age: 55
Setting detail: THERAPIES SERIES
Discharge: HOME | End: 2020-01-07
Payer: COMMERCIAL

## 2020-01-07 PROCEDURE — 97140 MANUAL THERAPY 1/> REGIONS: CPT

## 2020-01-07 PROCEDURE — 97110 THERAPEUTIC EXERCISES: CPT

## 2020-01-07 NOTE — FLOWSHEET NOTE
testing Pre hab        Date 11/29/19 Re eval post op   Date :1/2/20 4 week f/u   Date  8 week f/u  Date  D/c            TUG 5.98\" 14.85\"      30 second sit to stand 11 6      6 minute walk 466.04 m 298.98 m      Balance        Narrow BLAINE 10 10      Semi tandem 10 10      Tandem  L 6.71\"  R 3.88\" 1.7\"      SLS 10 0      Knee AROM L/R 0-140 L 10-90      Knee Extension MMT R/L L 5   R 4 L 4-/5   R       Hip aBduction MMT R/L L 3+  R 4 L 4-/5       LEFS 41;  20; 60-79%                    RESTRICTIONS/PRECAUTIONS: R TKR 3/2019    Exercises/Interventions:     Therapeutic Exercise (28572)  Resistance / level Sets/sec Reps Notes / Cues   Supine gluteal isos Verbally educated on HEP as patient had to leave as wife had to go to hospital for possible [place]   HEP   Supine quad isos  5\" 10 HEP   Supine knee extension prop  5\" 5 HEP   Ankle pumps    HEP   Supine assisted Knee flexion  5\" 10 HEP   SLR flexion  2/5\" 10 HEP; min A by PT to avoid quad lag initially   Long sit HS stretch  30\" 3 L HEP   Towel gastro stretch  20\" 3 L HEP   Quad stretch off EOB  30\" 3 L HEP   Standing TKE Blue band 2 10 L  HEP   Standing HS stretch L 30\" 3    Standing L Knee AAROM stretch on 8\" step  10\"  5    Standing Runner's stretch L  30\" 3    B Heel raises  2 10 zB UE support   Sit to stand chair  1 5 With B UE support and L=R WBing   Bike  5'  Partial revolutions                  Therapeutic Activities (48374)       11/29/19 Pt was educated on PT POC, Diagnosis, Prognosis, pathomechanics as well as frequency and duration of scheduling future physical therapy appointments. Time was also taken on this day to answer all patient questions and participation in PT.                              Neuromuscular Re-ed (21915)       TKE Blue 2 10 L    Tandem stance  floor 30\" 2 L/R           Manual Intervention (16058)       Knee mobs/PROM  Supine PA glides Grade II femur followed with PROM L knee ext 5\" X8; PROM KNee flexion 5\" X8 Tib/Fem Mobs  See above     Patella Mobs  Superior glides Grade II followd with PROM knee extension 5\" X10 each     Ankle mobs       Muscle manual stretches L HS ; L Lateral HS, L piriformis 30\" 2 each L lE    Prone  PA glide to L proximal fibular joint Grade III fiollowed with L passive DF and gastroc stretches;          Pt. Education:  -pt educated on diagnosis, prognosis and expectations for rehab  -pt provided with written and illustrated instructions for HEP  -all pt questions were answered    Therapeutic Exercise and NMR EXR  [x] (22443) Provided verbal/tactile cueing for activities related to strengthening, flexibility, endurance, ROM for improvements in LE, proximal hip, and core control with self care, mobility, lifting, ambulation.  [] (79150) Provided verbal/tactile cueing for activities related to improving balance, coordination, kinesthetic sense, posture, motor skill, proprioception  to assist with LE, proximal hip, and core control in self care, mobility, lifting, ambulation and eccentric single leg control.      NMR and Therapeutic Activities:    [x] (63872 or 55737) Provided verbal/tactile cueing for activities related to improving balance, coordination, kinesthetic sense, posture, motor skill, proprioception and motor activation to allow for proper function of core, proximal hip and LE with self care and ADLs  [] (06272) Gait Re-education- Provided training and instruction to the patient for proper LE, core and proximal hip recruitment and positioning and eccentric body weight control with ambulation re-education including up and down stairs     Home Exercise Program:    [x] (63918) Reviewed/Progressed HEP activities related to strengthening, flexibility, endurance, ROM of core, proximal hip and LE for functional self-care, mobility, lifting and ambulation/stair navigation   [] (89417)Reviewed/Progressed HEP activities related to improving balance, coordination, kinesthetic sense, posture, motor skill, proprioception of core, proximal hip and LE for self care, mobility, lifting, and ambulation/stair navigation      Manual Treatments:  PROM / STM / Oscillations-Mobs:  G-I, II, III, IV (PA's, Inf., Post.)  [] (08195) Provided manual therapy to mobilize LE, proximal hip and/or LS spine soft tissue/joints for the purpose of modulating pain, promoting relaxation,  increasing ROM, reducing/eliminating soft tissue swelling/inflammation/restriction, improving soft tissue extensibility and allowing for proper ROM for normal function with self care, mobility, lifting and ambulation. Modalities:  [] (44697) Vasopneumatic compression: Utilized vasopneumatic compression to decrease edema / swelling for the purpose of improving mobility and quad tone / recruitment which will allow for increased overall function including but not limited to self-care, transfers, ambulation, and ascending / descending stairs. Modalities:  Patient states will ice at home    Charges:  Timed Code Treatment Minutes: 50   Total Treatment Minutes: 50     [] EVAL - LOW (16921)   [] EVAL - MOD (15666)  [] EVAL - HIGH (59030)  [] RE-EVAL (99478)  [x] GY(04643) x   2   [] Ionto  [] NMR (58369) x      [] Vaso  [x] Manual (64367) x  1    [] Ultrasound  [] TA x  1     [] Mech Traction (38084)  [] Aquatic Therapy x     [] ES (un) (56457):   [] Home Management Training x [] ES(attended) (63050)   [] Group:     [] Other:     GOALS:   GOALS:  Patient stated goal: build quad muscle  [x] Progressing: [] Met: [] Not Met: [] Adjusted    Therapist goals for Patient:   Short Term Goals: To be achieved in: 1 visit  1. Independent in HEP and progression per patient tolerance, in order to prevent re-injury. [x] Progressing: [] Met: [] Not Met: [] Adjusted  2. Patient will have a decrease in pain to facilitate improvement in movement, function, and ADLs as indicated by Functional Deficits. [x] Progressing: [] Met: [] Not Met: [] Adjusted  3.  All patient

## 2020-01-09 ENCOUNTER — HOSPITAL ENCOUNTER (OUTPATIENT)
Dept: PHYSICAL THERAPY | Age: 55
Setting detail: THERAPIES SERIES
Discharge: HOME | End: 2020-01-09
Payer: COMMERCIAL

## 2020-01-09 ENCOUNTER — APPOINTMENT (OUTPATIENT)
Dept: PHYSICAL THERAPY | Age: 55
End: 2020-01-09
Payer: COMMERCIAL

## 2020-01-09 PROCEDURE — 97110 THERAPEUTIC EXERCISES: CPT

## 2020-01-09 NOTE — FLOWSHEET NOTE
Pt. Education:  -pt educated on diagnosis, prognosis and expectations for rehab  -pt provided with written and illustrated instructions for HEP  -all pt questions were answered    Therapeutic Exercise and NMR EXR  [x] (11593) Provided verbal/tactile cueing for activities related to strengthening, flexibility, endurance, ROM for improvements in LE, proximal hip, and core control with self care, mobility, lifting, ambulation.  [] (96862) Provided verbal/tactile cueing for activities related to improving balance, coordination, kinesthetic sense, posture, motor skill, proprioception  to assist with LE, proximal hip, and core control in self care, mobility, lifting, ambulation and eccentric single leg control.      NMR and Therapeutic Activities:    [] (79243 or 11259) Provided verbal/tactile cueing for activities related to improving balance, coordination, kinesthetic sense, posture, motor skill, proprioception and motor activation to allow for proper function of core, proximal hip and LE with self care and ADLs  [] (19520) Gait Re-education- Provided training and instruction to the patient for proper LE, core and proximal hip recruitment and positioning and eccentric body weight control with ambulation re-education including up and down stairs     Home Exercise Program:    [] (61401) Reviewed/Progressed HEP activities related to strengthening, flexibility, endurance, ROM of core, proximal hip and LE for functional self-care, mobility, lifting and ambulation/stair navigation   [] (58410)Reviewed/Progressed HEP activities related to improving balance, coordination, kinesthetic sense, posture, motor skill, proprioception of core, proximal hip and LE for self care, mobility, lifting, and ambulation/stair navigation      Manual Treatments:  PROM / STM / Oscillations-Mobs:  G-I, II, III, IV (PA's, Inf., Post.)  [] (25174) Provided manual therapy to mobilize LE, proximal hip and/or LS spine soft tissue/joints for the purpose of modulating pain, promoting relaxation,  increasing ROM, reducing/eliminating soft tissue swelling/inflammation/restriction, improving soft tissue extensibility and allowing for proper ROM for normal function with self care, mobility, lifting and ambulation. Modalities:  [] (09144) Vasopneumatic compression: Utilized vasopneumatic compression to decrease edema / swelling for the purpose of improving mobility and quad tone / recruitment which will allow for increased overall function including but not limited to self-care, transfers, ambulation, and ascending / descending stairs. Modalities:  Patient states will ice at home  1/9: CP to L knee in supine x 10 mins     Charges:  Timed Code Treatment Minutes: 26   Total Treatment Minutes: 36     [] EVAL - LOW (69313)   [] EVAL - MOD (88467)  [] EVAL - HIGH (81422)  [] RE-EVAL (25866)  [x] ZL(43891) x   2   [] Ionto  [] NMR (02104) x      [] Vaso  [] Manual (01384) x      [] Ultrasound  [] TA x       [] Mech Traction (75135)  [] Aquatic Therapy x     [] ES (un) (75171):   [] Home Management Training x [] ES(attended) (09340)   [] Group:     [] Other:     GOALS:   GOALS:  Patient stated goal: build quad muscle  [x] Progressing: [] Met: [] Not Met: [] Adjusted    Therapist goals for Patient:   Short Term Goals: To be achieved in: 1 visit  1. Independent in HEP and progression per patient tolerance, in order to prevent re-injury. [x] Progressing: [] Met: [] Not Met: [] Adjusted  2. Patient will have a decrease in pain to facilitate improvement in movement, function, and ADLs as indicated by Functional Deficits. [x] Progressing: [] Met: [] Not Met: [] Adjusted  3. All patient questions regarding expectations for rehab following upcoming surgery are answered. [] Progressing: [x] Met: [] Not Met: [] Adjusted    Long Term Goals:   Long Term Goals: To be achieved in: 6-8 weeks  1.  Disability index score of 18% or less for the LEFS to assist with reaching stevan najera  [] Patient limited by pain  [] Patient limited by other medical complications  [] Other:     Prognosis:  [x] Good [] Fair  [] Poor    Patient Requires Follow-up: [x] Yes  [] No    Return to Play:    [x]  N/A   []  Stage 1: Intro to Strength   []  Stage 2: Return to Run and Strength   []  Stage 3: Return to Jump and Strength   []  Stage 4: Dynamic Strength and Agility   []  Stage 5: Sport Specific Training     []  Ready to Return to Play, Meets All Above Stages   []  Not Ready for Return to Sports   Comments:            PLAN: See eval. PT 2-3x / week for 4-6 weeks after sx   [x] Continue per plan of care [] Alter current plan (see comments)  [] Plan of care initiated [] Hold pending MD visit [] Discharge    Electronically signed by: Jesus Alberto Hoyt PT, DPT      Note: If patient does not return for scheduled/ recommended follow up visits, his note will serve as a discharge from care along with most recent update on progress.

## 2020-01-10 ENCOUNTER — HOSPITAL ENCOUNTER (OUTPATIENT)
Dept: PHYSICAL THERAPY | Age: 55
Setting detail: THERAPIES SERIES
Discharge: HOME | End: 2020-01-10
Payer: COMMERCIAL

## 2020-01-10 PROCEDURE — 97110 THERAPEUTIC EXERCISES: CPT

## 2020-01-10 NOTE — FLOWSHEET NOTE
testing Pre hab        Date 11/29/19 Re eval post op   Date :1/2/20 4 week f/u   Date  8 week f/u  Date  D/c            TUG 5.98\" 14.85\"      30 second sit to stand 11 6      6 minute walk 466.04 m 298.98 m      Balance        Narrow BLAINE 10 10      Semi tandem 10 10      Tandem  L 6.71\"  R 3.88\" 1.7\"      SLS 10 0      Knee AROM L/R 0-140 L 10-90      Knee Extension MMT R/L L 5   R 4 L 4-/5   R       Hip aBduction MMT R/L L 3+  R 4 L 4-/5       LEFS 41;  20; 60-79%                    RESTRICTIONS/PRECAUTIONS: R TKR 3/2019    Exercises/Interventions:     Therapeutic Exercise (14269)  Resistance / level Sets/sec Reps Notes / Cues   Supine gluteal isos Verbally educated on HEP as patient had to leave as wife had to go to hospital for possible Blood clot from new port   HEP   Supine quad isos HEP   Supine knee extension prop HEP   Ankle pumps HEP   Supine assisted Knee flexion HEP   SLR flexion HEP; min A by PT to avoid quad lag initially   Long sit HS stretch HEP   Towel gastro stretch HEP   Quad stretch off EOB HEP   Standing TKE HEP   Standing HS stretch  Standing hip flexor stretch   Standing hip flexion with SLS  Lateral step ups   Forward step down    Forward step ups 12\" step  12\" step  6\" step  6\" step  6\" step    6\" step 30 sec  30 sec          Reciprocal up  Reciprocal down  X 2 B  X 2 B   X 10 B   X 10 B  x10 leading R     X 3  x3     VCs for weight shift onto L LE, B UE support   Quad weakness observed, required B UE support     B UE support  \"   Standing L Knee AAROM stretch on 8\" step     Standing Runner's stretch    B Heel raises/ B Toe raises  zB UE support   Sit to stand chair  With B UE support and L=R WBing   Bike  Partial revolutions    squats  B UE support    gastroc step off edge of stairs     LAQ     SciFit  Level 2 6 min          Intermittnet UE support                                                            Therapeutic Activities (48752)       11/29/19 Pt was educated on PT POC, to strengthening, flexibility, endurance, ROM of core, proximal hip and LE for functional self-care, mobility, lifting and ambulation/stair navigation   [] (12842)Reviewed/Progressed HEP activities related to improving balance, coordination, kinesthetic sense, posture, motor skill, proprioception of core, proximal hip and LE for self care, mobility, lifting, and ambulation/stair navigation      Manual Treatments:  PROM / STM / Oscillations-Mobs:  G-I, II, III, IV (PA's, Inf., Post.)  [] (17181) Provided manual therapy to mobilize LE, proximal hip and/or LS spine soft tissue/joints for the purpose of modulating pain, promoting relaxation,  increasing ROM, reducing/eliminating soft tissue swelling/inflammation/restriction, improving soft tissue extensibility and allowing for proper ROM for normal function with self care, mobility, lifting and ambulation. Modalities:  [] (52328) Vasopneumatic compression: Utilized vasopneumatic compression to decrease edema / swelling for the purpose of improving mobility and quad tone / recruitment which will allow for increased overall function including but not limited to self-care, transfers, ambulation, and ascending / descending stairs. Modalities:  Patient states will ice at home  1/10: 1/9: CP to L knee in supine x 10 mins     Charges:  Timed Code Treatment Minutes: 31   Total Treatment Minutes: 41     [] EVAL - LOW (88272)   [] EVAL - MOD (90759)  [] EVAL - HIGH (55953)  [] RE-EVAL (87701)  [x] YL(96691) x   2   [] Ionto  [] NMR (49679) x      [] Vaso  [] Manual (93277) x      [] Ultrasound  [] TA x       [] Mech Traction (30622)  [] Aquatic Therapy x     [] ES (un) (49934):   [] Home Management Training x [] ES(attended) (03593)   [] Group:     [] Other:       GOALS:  Patient stated goal: build quad muscle  [x] Progressing: [] Met: [] Not Met: [] Adjusted    Therapist goals for Patient:   Short Term Goals: To be achieved in: 1 visit  1.  Independent in HEP and progression per patient tolerance, in order to prevent re-injury. [x] Progressing: [] Met: [] Not Met: [] Adjusted  2. Patient will have a decrease in pain to facilitate improvement in movement, function, and ADLs as indicated by Functional Deficits. [x] Progressing: [] Met: [] Not Met: [] Adjusted  3. All patient questions regarding expectations for rehab following upcoming surgery are answered. [] Progressing: [x] Met: [] Not Met: [] Adjusted    Long Term Goals:   Long Term Goals: To be achieved in: 6-8 weeks  1. Disability index score of 18% or less for the LEFS to assist with reaching prior level of function. [] Progressing: [] Met: [] Not Met: [] Adjusted  2. Patient will demonstrate increased AROM to L knee 0-110  to allow for proper joint functioning as indicated by patientsability to perform L=R sit to stand with equal weight-bearing,  [] Progressing: [] Met: [] Not Met: [] Adjusted  3. Patient will demonstrate an increase in Strength to at least 4+/5  as well as good proximal hip strength and control to allow for proper functional mobility as indicated by patients ability to perform reciprocal stairs with 1 rail   [] Progressing: [] Met: [] Not Met: [] Adjusted  4. Patient will return to functional activities including amb. Up/down ramp without increased symptoms or restriction. [] Progressing: [] Met: [] Not Met: [] Adjusted  5. Patient to return to work with [employer] and able to work a full schedule with pain 2/10 or less by discharge. [] Progressing: [] Met: [] Not Met: [] Adjusted       Overall Progression Towards Functional goals/ Treatment Progress Update:  [] Patient is progressing as expected towards functional goals listed. [] Progression is slowed due to complexities/Impairments listed. [] Progression has been slowed due to co-morbidities.   [x] Plan just implemented, too soon to assess goals progression <30days   [] Goals require adjustment due to lack of progress  [] Patient is not

## 2020-01-13 ENCOUNTER — HOSPITAL ENCOUNTER (OUTPATIENT)
Dept: PHYSICAL THERAPY | Age: 55
Setting detail: THERAPIES SERIES
Discharge: HOME | End: 2020-01-13
Payer: COMMERCIAL

## 2020-01-13 ENCOUNTER — TELEPHONE (OUTPATIENT)
Dept: ORTHOPEDIC SURGERY | Age: 55
End: 2020-01-13

## 2020-01-13 PROCEDURE — 97110 THERAPEUTIC EXERCISES: CPT

## 2020-01-13 PROCEDURE — 97112 NEUROMUSCULAR REEDUCATION: CPT

## 2020-01-13 RX ORDER — OXYCODONE HYDROCHLORIDE 5 MG/1
5 TABLET ORAL EVERY 4 HOURS PRN
Qty: 40 TABLET | Refills: 0 | Status: SHIPPED | OUTPATIENT
Start: 2020-01-13 | End: 2020-01-21 | Stop reason: SDUPTHER

## 2020-01-13 NOTE — FLOWSHEET NOTE
[address] [facility]       Physical Therapy Treatment Note/ Progress Report:     Date:  2020    Patient Name:  Spencer Joshua    :  1965  MRN: [id number]  Restrictions/Precautions:    Medical/Treatment Diagnosis Information:  · Diagnosis: L knee OA M17.12  · Treatment Diagnosis: L LE decreased Knee ROM and l LE weakness Limiting Functional Gait/Stairs  Insurance/Certification information:  PT Insurance Information: BCBS; pre-authorization needed  Physician Information:  Referring Practitioner: Leopold Leek  Plan of care signed (Y/N): [x]  Yes []  No     Date of Patient follow up with Physician:      Progress Report: []  Yes  [x]  No     Date Range for reporting period:  Beginnin20  Ending: re-eval on 20    Progress report due (10 Rx/or 30 days whichever is less):     Recertification due (POC duration/ or 90 days whichever is less):    Visit # Insurance Allowable Auth required? Date Range    +  preauthorize []  Yes  [x]  No To be determined     Latex Allergy:  [x]NO      []YES  Preferred Language for Healthcare:   [x]English       []other:    Functional Scale: LEFS: 41/40-59% LOF  Date assessed:19    Pain level:  5-6/10     SUBJECTIVE:  Pt states he was walking with his cane at home this mornonmg and rolled his R ankle and\"almost \" came down on his L knee but \"I caught myself\". Patient states after tgis that he plans on walking his dogs despite PT recommending he should not do this activity yet especially since he has a fenced in yard. Patient states he would like to finish up PT in 2 more weeks as he plans on returning to work, but then decided to schedule a few more visits per PT suggestion.   OBJECTIVE:   :L knee 5 degrees from fulll terminal knee extension; after stretches/manual 3 degrees from terminal ext  : unable to get gliders on bottom of walker due to plastic from original tip being pushed up into leg, did care, mobility, lifting, ambulation and eccentric single leg control. NMR and Therapeutic Activities:    [x] (73915 or 79441) Provided verbal/tactile cueing for activities related to improving balance, coordination, kinesthetic sense, posture, motor skill, proprioception and motor activation to allow for proper function of core, proximal hip and LE with self care and ADLs  [] (33581) Gait Re-education- Provided training and instruction to the patient for proper LE, core and proximal hip recruitment and positioning and eccentric body weight control with ambulation re-education including up and down stairs     Home Exercise Program:    [x] (08895) Reviewed/Progressed HEP activities related to strengthening, flexibility, endurance, ROM of core, proximal hip and LE for functional self-care, mobility, lifting and ambulation/stair navigation   [] (79400)Reviewed/Progressed HEP activities related to improving balance, coordination, kinesthetic sense, posture, motor skill, proprioception of core, proximal hip and LE for self care, mobility, lifting, and ambulation/stair navigation      Manual Treatments:  PROM / STM / Oscillations-Mobs:  G-I, II, III, IV (PA's, Inf., Post.)  [x] (52492) Provided manual therapy to mobilize LE, proximal hip and/or LS spine soft tissue/joints for the purpose of modulating pain, promoting relaxation,  increasing ROM, reducing/eliminating soft tissue swelling/inflammation/restriction, improving soft tissue extensibility and allowing for proper ROM for normal function with self care, mobility, lifting and ambulation. Modalities:  [x] (47723) Vasopneumatic compression: Utilized vasopneumatic compression to decrease edema / swelling for the purpose of improving mobility and quad tone / recruitment which will allow for increased overall function including but not limited to self-care, transfers, ambulation, and ascending / descending stairs.        Modalities:  Patient states will ice at activities including amb. Up/down ramp without increased symptoms or restriction. [x] Progressing: [] Met: [] Not Met: [] Adjusted  5. Patient to return to work with [employer] and able to work a full schedule with pain 2/10 or less by discharge. [x] Progressing: [] Met: [] Not Met: [] Adjusted       Overall Progression Towards Functional goals/ Treatment Progress Update:  [x] Patient is progressing as expected towards functional goals listed. [] Progression is slowed due to complexities/Impairments listed. [] Progression has been slowed due to co-morbidities. [] Plan just implemented, too soon to assess goals progression <30days   [] Goals require adjustment due to lack of progress  [] Patient is not progressing as expected and requires additional follow up with physician  [] Other    Persisting Functional Limitations/Impairments:  []Sitting [x]Standing   [x]Walking [x]Stairs   []Transfers []ADLs   [x]Squatting/bending [x]Kneeling  []Housework [x]Job related tasks  []Driving [x]Sports/Recreation   []Sleeping []Other:    ASSESSMENT:  Patient slowly improving with L knee terminal extension but still struggles with L Hip Strength and terminal quad activation. Patient improved aftre using neuromuscular re-ed with quad/SLR flexion. Patient will benefit from skilled Pt to address his impairments and functional limitations.         Treatment/Activity Tolerance:  [x] Pt able to complete treatment [] Patient limited by fatique  [] Patient limited by pain  [] Patient limited by other medical complications  [] Other:     Prognosis:  [x] Good [] Fair  [] Poor    Patient Requires Follow-up: [x] Yes  [] No    Return to Play:    [x]  N/A   []  Stage 1: Intro to Strength   []  Stage 2: Return to Run and Strength   []  Stage 3: Return to Jump and Strength   []  Stage 4: Dynamic Strength and Agility   []  Stage 5: Sport Specific Training     []  Ready to Return to Play, Meets All Above Stages   []  Not Ready for Return to

## 2020-01-13 NOTE — TELEPHONE ENCOUNTER
Patient called to obtain refill of Hydrocodone.     Pharmacy:  [address]: [phone]    Please call patient when complete:  [phone]

## 2020-01-15 ENCOUNTER — HOSPITAL ENCOUNTER (OUTPATIENT)
Dept: PHYSICAL THERAPY | Age: 55
Setting detail: THERAPIES SERIES
Discharge: HOME | End: 2020-01-15
Payer: COMMERCIAL

## 2020-01-15 PROCEDURE — 97110 THERAPEUTIC EXERCISES: CPT

## 2020-01-15 PROCEDURE — 97112 NEUROMUSCULAR REEDUCATION: CPT

## 2020-01-15 NOTE — FLOWSHEET NOTE
[address] [facility]       Physical Therapy Treatment Note/ Progress Report:     Date:  1/15/2020    Patient Name:  Kiana Eric    :  1965  MRN: [id number]  Restrictions/Precautions:    Medical/Treatment Diagnosis Information:  · Diagnosis: L knee OA M17.12  · Treatment Diagnosis: L LE decreased Knee ROM and l LE weakness Limiting Functional Gait/Stairs  Insurance/Certification information:  PT Insurance Information: BCBS; pre-authorization needed  Physician Information:  Referring Practitioner: María Elena Fiore  Plan of care signed (Y/N): [x]  Yes []  No     Date of Patient follow up with Physician:      Progress Report: []  Yes  [x]  No     Date Range for reporting period:  Beginnin20  Ending: re-eval on 20    Progress report due (10 Rx/or 30 days whichever is less):     Recertification due (POC duration/ or 90 days whichever is less):    Visit # Insurance Allowable Auth required? Date Range    preauthorize []  Yes  [x]  No To be determined     Latex Allergy:  [x]NO      []YES  Preferred Language for Healthcare:   [x]English       []other:    Functional Scale: LEFS: 41/40-59% LOF  Date assessed:19    Pain level:  4-5/10     SUBJECTIVE:  Pt states his L knee is doing pretty well. He walked about a half a mile with his dogs while he was on a RW and they were off leash.     OBJECTIVE:   1/15:ROM not tested  :L knee 5 degrees from fulll terminal knee extension; after stretches/manual 3 degrees from terminal ext  : unable to get gliders on bottom of walker due to plastic from original tip being pushed up into leg, did put on tennis balls   :Test Measurements: L Knee flexion seated before tx: 95; after TX seated 110; after Tx L knee AAROM L Knee extension 4 degrees from neutral  : Test Measurements:L HS -38 L quad 60, L gastroc 2; see Table below for other measurements: gait with SPC : decreased L LE weight-bearing Sidelying Hip Ab 0# 2 10    Mat Prone hip ext 0# 2 10    Mat SAQ 0# 2 15 L                                       Therapeutic Activities (17539)       11/29/19 Pt was educated on PT POC, Diagnosis, Prognosis, pathomechanics as well as frequency and duration of scheduling future physical therapy appointments. Time was also taken on this day to answer all patient questions and participation in PT. Neuromuscular Re-ed (30715)       TKE    Tandem stance  floor 30\" 2 L/R    TRX Squats  Added on 1/13   6\" FWD step -up L LE with R foot tap on 4\" steo on side  1 15 Added on 1/15 Holding stick   4\" FWD step=down  1 12 L LE  Added on 1/15 Holding stick   Biodex Balance Postural    Added on 1/15   Airex Stagger stance    Stagger stance with B GH flexion 30\" X2      X10 L/R      1 each L/R    Manual Intervention (96407)       Knee mobs/PROM      Tib/Fem Mobs      Patella Mobs    Ankle mobs    Muscle manual stretches    Prone         Pt. Education:  1/10 - pt educated on performing forward and lateral step-ups for HEP with instruction on how to shift weight to facilitate alternating pattern on stairs   -pt educated on diagnosis, prognosis and expectations for rehab  -pt provided with written and illustrated instructions for HEP  -all pt questions were answered    Therapeutic Exercise and NMR EXR  [x] (17581) Provided verbal/tactile cueing for activities related to strengthening, flexibility, endurance, ROM for improvements in LE, proximal hip, and core control with self care, mobility, lifting, ambulation.  [] (30279) Provided verbal/tactile cueing for activities related to improving balance, coordination, kinesthetic sense, posture, motor skill, proprioception  to assist with LE, proximal hip, and core control in self care, mobility, lifting, ambulation and eccentric single leg control.      NMR and Therapeutic Activities:    [x] (07372 or 91057) Provided verbal/tactile cueing for activities related to improving balance, coordination, kinesthetic sense, posture, motor skill, proprioception and motor activation to allow for proper function of core, proximal hip and LE with self care and ADLs  [] (60688) Gait Re-education- Provided training and instruction to the patient for proper LE, core and proximal hip recruitment and positioning and eccentric body weight control with ambulation re-education including up and down stairs     Home Exercise Program:    [x] (72755) Reviewed/Progressed HEP activities related to strengthening, flexibility, endurance, ROM of core, proximal hip and LE for functional self-care, mobility, lifting and ambulation/stair navigation   [] (77592)Reviewed/Progressed HEP activities related to improving balance, coordination, kinesthetic sense, posture, motor skill, proprioception of core, proximal hip and LE for self care, mobility, lifting, and ambulation/stair navigation      Manual Treatments:  PROM / STM / Oscillations-Mobs:  G-I, II, III, IV (PA's, Inf., Post.)  [x] (83997) Provided manual therapy to mobilize LE, proximal hip and/or LS spine soft tissue/joints for the purpose of modulating pain, promoting relaxation,  increasing ROM, reducing/eliminating soft tissue swelling/inflammation/restriction, improving soft tissue extensibility and allowing for proper ROM for normal function with self care, mobility, lifting and ambulation. Modalities:  [x] (19143) Vasopneumatic compression: Utilized vasopneumatic compression to decrease edema / swelling for the purpose of improving mobility and quad tone / recruitment which will allow for increased overall function including but not limited to self-care, transfers, ambulation, and ascending / descending stairs.        Modalities:  Patient states will ice at home  1/15, 1/13, 1/10: 1/9: CP to L knee in supine x 10 mins     Charges:  Timed Code Treatment Minutes: 40   Total Treatment Minutes: 50     [] EVAL - LOW (45539)   [] EVAL - MOD (14093)  [] EVAL - HIGH (73505)  [] RE-EVAL (48002)  [x] XP(64622) x   1   [] Ionto  [x] NMR (33923) x2      [] Vaso  [] Manual (51610) x    1  [] Ultrasound  [] TA x       [] Mech Traction (67728)  [] Aquatic Therapy x     [] ES (un) (58985):   [] Home Management Training x [] ES(attended) (56826)   [] Group:     [] Other:       GOALS:  Patient stated goal: build quad muscle  [x] Progressing: [] Met: [] Not Met: [] Adjusted    Therapist goals for Patient:   Short Term Goals: To be achieved in: 1 visit  1. Independent in HEP and progression per patient tolerance, in order to prevent re-injury. [x] Progressing: [] Met: [] Not Met: [] Adjusted  2. Patient will have a decrease in pain to facilitate improvement in movement, function, and ADLs as indicated by Functional Deficits. [x] Progressing: [] Met: [] Not Met: [] Adjusted  3. All patient questions regarding expectations for rehab following upcoming surgery are answered. [] Progressing: [x] Met: [] Not Met: [] Adjusted    Long Term Goals:   Long Term Goals: To be achieved in: 6-8 weeks  1. Disability index score of 18% or less for the LEFS to assist with reaching prior level of function. [x] Progressing: [] Met: [] Not Met: [] Adjusted  2. Patient will demonstrate increased AROM to L knee 0-110  to allow for proper joint functioning as indicated by patientsability to perform L=R sit to stand with equal weight-bearing,  [x] Progressing: [] Met: [] Not Met: [] Adjusted  3. Patient will demonstrate an increase in Strength to at least 4+/5  as well as good proximal hip strength and control to allow for proper functional mobility as indicated by patients ability to perform reciprocal stairs with 1 rail   [x] Progressing: [] Met: [] Not Met: [] Adjusted  4. Patient will return to functional activities including amb. Up/down ramp without increased symptoms or restriction. [x] Progressing: [] Met: [] Not Met: [] Adjusted  5.  Patient to return to work with [employer] and

## 2020-01-17 ENCOUNTER — HOSPITAL ENCOUNTER (OUTPATIENT)
Dept: PHYSICAL THERAPY | Age: 55
Setting detail: THERAPIES SERIES
Discharge: HOME | End: 2020-01-17
Payer: COMMERCIAL

## 2020-01-17 PROCEDURE — 97530 THERAPEUTIC ACTIVITIES: CPT

## 2020-01-17 PROCEDURE — 97110 THERAPEUTIC EXERCISES: CPT

## 2020-01-17 NOTE — FLOWSHEET NOTE
gait with SPC : decreased L LE weight-bearing with foot flat initial contact and L LE hip/knee lateral rotation; in supine L lE in Hip lateral rotation with moderate to severe Lateral HS and piriformis tightness  Functional testing Pre hab        Date 11/29/19 Re eval post op   Date :1/2/20 4 week f/u   Date   1/30/19 8 week f/u  Date  D/c            TUG 5.98\" 14.85\"      30 second sit to stand 11 6      6 minute walk 466.04 m 298.98 m      Balance        Narrow BLAINE 10 10      Semi tandem 10 10      Tandem  L 6.71\"  R 3.88\" 1.7\"      SLS 10 0      Knee AROM L/R 0-140 L 10-90      Knee Extension MMT R/L L 5   R 4 L 4-/5   R       Hip aBduction MMT R/L L 3+  R 4 L 4-/5       LEFS 41;  20; 60-79%                    RESTRICTIONS/PRECAUTIONS: R TKR 3/2019    Exercises/Interventions:     Therapeutic Exercise (47540)  Resistance / level Sets/sec Reps Notes / Cues   Supine gluteal isos Verbally educated on HEP as patient had to leave as wife had to go to hospital [place]   HEP   Supine quad isos HEP NMR with towel rolls behind knee   Supine knee extension prop HEP   Ankle pumps HEP   Supine assisted Knee flexion HEP   SLR flexion HEP; NMR min A by PT to avoid quad lag initially, by end SBA   Long sit HS stretch HEP   Towel gastro stretch HEP   Quad stretch off EOB HEP   Standing TKE HEP   Standing HS stretch  Standing hip flexor stretch   Standing hip flexion with SLS  Lateral step ups   Forward step down    Forward step ups 12\" step30 seX 2 B    VCs for weight shift onto L LE, B UE support   Quad weakness observed, required B UE support     B UE support  \"   Standing L Knee AAROM stretch on 8\" step     Standing Runner's stretch    B Heel raises/ B Toe raises  zB UE support   Sit to stand chair  With B UE support and L=R WBing   Bike  5' minsl 1/13:  Full revolutions    squats  B UE support    gastroc step off edge of stairs     LAQ     SciFit       Cybex Leg Press 10  10 Intermittnet UE support    Mat Sidelying Hip Ab    Mat Prone hip ext    Mat SAQ    Cable column   Standing TKE   3-way SLR    3 plates  1 plate     X 15   X 10 B each direction       Cane in R hand during L stance                                Therapeutic Activities (62050)       11/29/19 Pt was educated on PT POC, Diagnosis, Prognosis, pathomechanics as well as frequency and duration of scheduling future physical therapy appointments. Time was also taken on this day to answer all patient questions and participation in PT.         6 in stairs - forward step up, forward step down   8 min  Alternating pattern with unilateral railing support                  Neuromuscular Re-ed (12152)       TKE    Tandem stance     TRX Squats Added on 1/13   6\" FWD step -up L LE with R foot tap on 4\" steo on side Added on 1/15 Holding stick   4\" FWD step=down Added on 1/15 Holding stick   Biodex Balance Added on 1/15   Airex    Manual Intervention (80171)       Knee mobs/PROM      Tib/Fem Mobs      Patella Mobs    Ankle mobs    Muscle manual stretches    Prone         Pt. Education:  1/10 - pt educated on performing forward and lateral step-ups for HEP with instruction on how to shift weight to facilitate alternating pattern on stairs   -pt educated on diagnosis, prognosis and expectations for rehab  -pt provided with written and illustrated instructions for HEP  -all pt questions were answered    Therapeutic Exercise and NMR EXR  [x] (34590) Provided verbal/tactile cueing for activities related to strengthening, flexibility, endurance, ROM for improvements in LE, proximal hip, and core control with self care, mobility, lifting, ambulation.  [] (06275) Provided verbal/tactile cueing for activities related to improving balance, coordination, kinesthetic sense, posture, motor skill, proprioception  to assist with LE, proximal hip, and core control in self care, mobility, lifting, ambulation and eccentric single leg control.      NMR and Therapeutic Progressing: [] Met: [] Not Met: [] Adjusted  5. Patient to return to work with [employer] and able to work a full schedule with pain 2/10 or less by discharge. [x] Progressing: [] Met: [] Not Met: [] Adjusted       Overall Progression Towards Functional goals/ Treatment Progress Update:  [x] Patient is progressing as expected towards functional goals listed. [] Progression is slowed due to complexities/Impairments listed. [] Progression has been slowed due to co-morbidities. [] Plan just implemented, too soon to assess goals progression <30days   [] Goals require adjustment due to lack of progress  [] Patient is not progressing as expected and requires additional follow up with physician  [] Other    Persisting Functional Limitations/Impairments:  []Sitting [x]Standing   [x]Walking [x]Stairs   []Transfers []ADLs   [x]Squatting/bending [x]Kneeling  []Housework [x]Job related tasks  []Driving [x]Sports/Recreation   []Sleeping []Other:    ASSESSMENT:  Patient demonstrated increased functional strength and ability to ascend/descend 6\" stairs with unilateral railing and alternating pattern. Pt was unable to descend stairs without use of railing and continues to demonstrate ROM deficits and quad weakness at the L knee. Continue to progress strength, ROM specifically into extension next visit, and balance to improve functional mobility and progress to ambulating without an AD.       Treatment/Activity Tolerance:  [x] Pt able to complete treatment [] Patient limited by fatique  [] Patient limited by pain  [] Patient limited by other medical complications  [] Other:     Prognosis:  [x] Good [] Fair  [] Poor    Patient Requires Follow-up: [x] Yes  [] No    Return to Play:    [x]  N/A   []  Stage 1: Intro to Strength   []  Stage 2: Return to Run and Strength   []  Stage 3: Return to Jump and Strength   []  Stage 4: Dynamic Strength and Agility   []  Stage 5: Sport Specific Training     []  Ready to Return to Play, [employer]

## 2020-01-20 ENCOUNTER — HOSPITAL ENCOUNTER (OUTPATIENT)
Dept: PHYSICAL THERAPY | Age: 55
Setting detail: THERAPIES SERIES
Discharge: HOME | End: 2020-01-20
Payer: COMMERCIAL

## 2020-01-20 PROCEDURE — 97140 MANUAL THERAPY 1/> REGIONS: CPT

## 2020-01-20 PROCEDURE — 97110 THERAPEUTIC EXERCISES: CPT

## 2020-01-20 NOTE — FLOWSHEET NOTE
to ambulating without an AD. Treatment/Activity Tolerance:  [x] Pt able to complete treatment [] Patient limited by fatique  [] Patient limited by pain  [] Patient limited by other medical complications  [] Other:     Prognosis:  [x] Good [] Fair  [] Poor    Patient Requires Follow-up: [x] Yes  [] No    Return to Play:    [x]  N/A   []  Stage 1: Intro to Strength   []  Stage 2: Return to Run and Strength   []  Stage 3: Return to Jump and Strength   []  Stage 4: Dynamic Strength and Agility   []  Stage 5: Sport Specific Training     []  Ready to Return to Play, Meets All Above Stages   []  Not Ready for Return to Sports   Comments:            PLAN: See eval. PT 2-3x / week for 4-6 weeks after sx   [x] Continue per plan of care [] Alter current plan (see comments)  [] Plan of care initiated [] Hold pending MD visit [] Discharge    Electronically signed by:Aleah Perry, SPT    Therapist was present, directed the patient's care, made skilled judgement, and was responsible for assessment and treatment of the patient. MICHELINE LYNN PT, DPT      Note: If patient does not return for scheduled/ recommended follow up visits, his note will serve as a discharge from care along with most recent update on progress.

## 2020-01-21 ENCOUNTER — TELEPHONE (OUTPATIENT)
Dept: ORTHOPEDIC SURGERY | Age: 55
End: 2020-01-21

## 2020-01-21 RX ORDER — OXYCODONE HYDROCHLORIDE 5 MG/1
5 TABLET ORAL EVERY 6 HOURS PRN
Qty: 28 TABLET | Refills: 0 | Status: SHIPPED | OUTPATIENT
Start: 2020-01-21 | End: 2020-01-28

## 2020-01-22 ENCOUNTER — HOSPITAL ENCOUNTER (OUTPATIENT)
Dept: PHYSICAL THERAPY | Age: 55
Setting detail: THERAPIES SERIES
Discharge: HOME | End: 2020-01-22
Payer: COMMERCIAL

## 2020-01-22 PROCEDURE — 97110 THERAPEUTIC EXERCISES: CPT

## 2020-01-22 PROCEDURE — 97112 NEUROMUSCULAR REEDUCATION: CPT

## 2020-01-22 NOTE — FLOWSHEET NOTE
[facility]       Physical Therapy Treatment Note/ Progress Report:     Date:  2020    Patient Name:  Spencer Joshua    :  1965  MRN: [id number]  Restrictions/Precautions:    Medical/Treatment Diagnosis Information:  · Diagnosis: L knee OA M17.12  · Treatment Diagnosis: L LE decreased Knee ROM and l LE weakness Limiting Functional Gait/Stairs  Insurance/Certification information:  PT Insurance Information: BCBS; pre-authorization needed  Physician Information:  Referring Practitioner: Leopold Leek  Plan of care signed (Y/N): [x]  Yes []  No     Date of Patient follow up with Physician: to MD 2/3/20     Progress Report: []  Yes  [x]  No     Date Range for reporting period:  Beginnin20  Ending: re-eval on 20    Progress report due (10 Rx/or 30 days whichever is less):     Recertification due (POC duration/ or 90 days whichever is less):    Visit # Insurance Allowable Auth required? Date Range    preauthorize []  Yes  [x]  No To be determined     Latex Allergy:  [x]NO      []YES  Preferred Language for Healthcare:   [x]English       []other:    Functional Scale: LEFS: 41/40-59% LOF  Date assessed:19    Pain level:  3/10 with pain medicine     SUBJECTIVE:  Pt states his L knee feels a little stiff and sore. .      OBJECTIVE:   :Amb.  With [facility] with decreasing wide BLAINE but limited knee flexion at terminal stance  : L knee 110 deg to lacking 7 PROM  1/15:ROM not tested  :L knee 5 degrees from fulll terminal knee extension; after stretches/manual 3 degrees from terminal ext  : unable to get gliders on bottom of walker due to plastic from original tip being pushed up into leg, did put on tennis balls   : Test Measurements: L Knee flexion supine before tx: 103; after TX seated 113; after Tx L knee AAROM L Knee extension 3 degrees from neutral  :Test Measurements: L Knee flexion seated before tx: 95; after TX seated 110; after Tx L knee AAROM L Knee extension 4 degrees from neutral  1/2: Test Measurements:L HS -38 L quad 60, L gastroc 2; see Table below for other measurements: gait with SPC : decreased L LE weight-bearing with foot flat initial contact and L LE hip/knee lateral rotation; in supine L lE in Hip lateral rotation with moderate to severe Lateral HS and piriformis tightness  Functional testing Pre hab        Date 11/29/19 Re eval post op   Date :1/2/20 4 week f/u   Date   1/30/19 8 week f/u  Date  D/c            TUG 5.98\" 14.85\"      30 second sit to stand 11 6      6 minute walk 466.04 m 298.98 m      Balance        Narrow BLAINE 10 10      Semi tandem 10 10      Tandem  L 6.71\"  R 3.88\" 1.7\"      SLS 10 0      Knee AROM L/R 0-140 L 10-90      Knee Extension MMT R/L L 5   R 4 L 4-/5   R       Hip aBduction MMT R/L L 3+  R 4 L 4-/5       LEFS 41;  20; 60-79%                    RESTRICTIONS/PRECAUTIONS: R TKR 3/2019    Exercises/Interventions:     Therapeutic Exercise (21526)  Resistance / level Sets/sec Reps Notes / Cues   Supine gluteal isos Verbally educated on HEP as patient had to leave as wife had to go to hospital [place]   HEP   Supine quad isos HEP NMR with towel rolls behind knee   Supine knee extension prop HEP   Ankle pumps HEP   Supine assisted Knee flexion HEP   SLR flexion 2# 3/5\" 10 HEP;    Long sit HS stretch 30\" 3 L HEP   Towel gastroc stretch 20\" 3 L HEP   Quad stretch Prone 30\" 3 L    Standing TKE HEP   Standing HS stretch  Standing hip flexor stretch   Standing hip flexion with SLS  Lateral step ups   Forward step down    Forward step ups 12\" step30 seX 2 B    VCs for weight shift onto L LE, B UE support   Quad weakness observed, required B UE support     B UE support  \"   Standing L Knee AAROM stretch on 8\" step     Standing Runner's stretch 30\" 3    B Heel raises/ B Toe raises  2 10 each  zB UE support   Sit to stand chair  With B UE support and activities related to strengthening, flexibility, endurance, ROM for improvements in LE, proximal hip, and core control with self care, mobility, lifting, ambulation.  [] (44662) Provided verbal/tactile cueing for activities related to improving balance, coordination, kinesthetic sense, posture, motor skill, proprioception  to assist with LE, proximal hip, and core control in self care, mobility, lifting, ambulation and eccentric single leg control. NMR and Therapeutic Activities:    [x] (64159 or 09571) Provided verbal/tactile cueing for activities related to improving balance, coordination, kinesthetic sense, posture, motor skill, proprioception and motor activation to allow for proper function of core, proximal hip and LE with self care and ADLs  [] (97576) Gait Re-education- Provided training and instruction to the patient for proper LE, core and proximal hip recruitment and positioning and eccentric body weight control with ambulation re-education including up and down stairs     Home Exercise Program:    [] (32771) Reviewed/Progressed HEP activities related to strengthening, flexibility, endurance, ROM of core, proximal hip and LE for functional self-care, mobility, lifting and ambulation/stair navigation   [] (85597)Reviewed/Progressed HEP activities related to improving balance, coordination, kinesthetic sense, posture, motor skill, proprioception of core, proximal hip and LE for self care, mobility, lifting, and ambulation/stair navigation      Manual Treatments:  PROM / STM / Oscillations-Mobs:  G-I, II, III, IV (PA's, Inf., Post.)  [] (07769) Provided manual therapy to mobilize LE, proximal hip and/or LS spine soft tissue/joints for the purpose of modulating pain, promoting relaxation,  increasing ROM, reducing/eliminating soft tissue swelling/inflammation/restriction, improving soft tissue extensibility and allowing for proper ROM for normal function with self care, mobility, lifting and ambulation. functional mobility and progress to ambulating without an AD. Treatment/Activity Tolerance:  [x] Pt able to complete treatment [] Patient limited by fatique  [] Patient limited by pain  [] Patient limited by other medical complications  [] Other:     Prognosis:  [x] Good [] Fair  [] Poor    Patient Requires Follow-up: [x] Yes  [] No    Return to Play:    [x]  N/A   []  Stage 1: Intro to Strength   []  Stage 2: Return to Run and Strength   []  Stage 3: Return to Jump and Strength   []  Stage 4: Dynamic Strength and Agility   []  Stage 5: Sport Specific Training     []  Ready to Return to Play, Meets All Above Stages   []  Not Ready for Return to Sports   Comments:            PLAN: See eval. PT 2-3x / week for 4-6 weeks after sx   [x] Continue per plan of care [] Alter current plan (see comments)  [] Plan of care initiated [] Hold pending MD visit [] Discharge    Electronically signed by: Stephanie Chavez PT, MSPT      Note: If patient does not return for scheduled/ recommended follow up visits, his note will serve as a discharge from care along with most recent update on progress.

## 2020-01-24 ENCOUNTER — HOSPITAL ENCOUNTER (OUTPATIENT)
Dept: PHYSICAL THERAPY | Age: 55
Setting detail: THERAPIES SERIES
Discharge: HOME | End: 2020-01-24
Payer: COMMERCIAL

## 2020-01-24 PROCEDURE — 97140 MANUAL THERAPY 1/> REGIONS: CPT

## 2020-01-24 PROCEDURE — 97110 THERAPEUTIC EXERCISES: CPT

## 2020-01-24 NOTE — FLOWSHEET NOTE
[facility]       Physical Therapy Treatment Note/ Progress Report:     Date:  2020    Patient Name:  Kiana Eric    :  1965  MRN: [id number]  Restrictions/Precautions:    Medical/Treatment Diagnosis Information:  · Diagnosis: L knee OA M17.12  · Treatment Diagnosis: L LE decreased Knee ROM and l LE weakness Limiting Functional Gait/Stairs  Insurance/Certification information:  PT Insurance Information: BCBS; pre-authorization needed  Physician Information:  Referring Practitioner: María Elena Fiore  Plan of care signed (Y/N): [x]  Yes []  No     Date of Patient follow up with Physician: to MD 2/3/20     Progress Report: []  Yes  [x]  No     Date Range for reporting period:  Beginnin20  Ending: re-eval on 20    Progress report due (10 Rx/or 30 days whichever is less):     Recertification due (POC duration/ or 90 days whichever is less):    Visit # Insurance Allowable Auth required? Date Range    preauthorize []  Yes  [x]  No To be determined     Latex Allergy:  [x]NO      []YES  Preferred Language for Healthcare:   [x]English       []other:    Functional Scale: LEFS: 41/40-59% LOF  Date assessed:19    Pain level:  3-4/10     SUBJECTIVE:  Pt reports he is returning to work Monday. Pt says his knee hurts worse after he has been up on it still, but ice, elevation and CBD oil seems to help. Is able to hike with dog in woods for about an hour and a half. OBJECTIVE:   : PROM of L knee lacking 4 deg ext   : Amb.  With [facility] with decreasing wide BLAINE but limited knee flexion at terminal stance  : L knee 110 deg to lacking 7 PROM  1/15:ROM not tested  :L knee 5 degrees from fulll terminal knee extension; after stretches/manual 3 degrees from terminal ext  : unable to get gliders on bottom of walker due to plastic from original tip being pushed up into leg, did put on tennis balls   : Test Measurements: L Knee flexion supine before tx: 103; after TX seated 113; after Tx L knee AAROM L Knee extension 3 degrees from neutral  1/7:Test Measurements: L Knee flexion seated before tx: 95; after TX seated 110; after Tx L knee AAROM L Knee extension 4 degrees from neutral  1/2: Test Measurements:L HS -38 L quad 60, L gastroc 2; see Table below for other measurements: gait with SPC : decreased L LE weight-bearing with foot flat initial contact and L LE hip/knee lateral rotation; in supine L lE in Hip lateral rotation with moderate to severe Lateral HS and piriformis tightness  Functional testing Pre hab        Date 11/29/19 Re eval post op   Date :1/2/20 4 week f/u   Date   1/30/19 8 week f/u  Date  D/c            TUG 5.98\" 14.85\"      30 second sit to stand 11 6      6 minute walk 466.04 m 298.98 m      Balance        Narrow BLAINE 10 10      Semi tandem 10 10      Tandem  L 6.71\"  R 3.88\" 1.7\"      SLS 10 0      Knee AROM L/R 0-140 L 10-90      Knee Extension MMT R/L L 5   R 4 L 4-/5   R       Hip aBduction MMT R/L L 3+  R 4 L 4-/5       LEFS 41;  20; 60-79%                    RESTRICTIONS/PRECAUTIONS: R TKR 3/2019    Exercises/Interventions:     Therapeutic Exercise (28361)  Resistance / level Sets/sec Reps Notes / Cues   Supine gluteal isos Verbally educated on HEP as patient had to leave as wife had to go to [place]   HEP   Supine quad isos HEP NMR with towel rolls behind knee   Supine knee extension prop HEP   Ankle pumps HEP   Supine assisted Knee flexion HEP   SLR flexion HEP;    Long sit HS stretch HEP   Towel gastroc stretch HEP   Quad stretch Prone    Standing TKE HEP   Standing HS stretch  Standing hip flexor stretch   Standing hip flexion with SLS  Lateral step ups   Forward step down    Forward step ups     VCs for weight shift onto L LE, B UE support   Quad weakness observed, required B UE support     B UE support  \"   Standing L Knee AAROM stretch on 8\" and illustrated instructions for HEP  -all pt questions were answered    Therapeutic Exercise and NMR EXR  [x] (70483) Provided verbal/tactile cueing for activities related to strengthening, flexibility, endurance, ROM for improvements in LE, proximal hip, and core control with self care, mobility, lifting, ambulation.  [] (92143) Provided verbal/tactile cueing for activities related to improving balance, coordination, kinesthetic sense, posture, motor skill, proprioception  to assist with LE, proximal hip, and core control in self care, mobility, lifting, ambulation and eccentric single leg control.      NMR and Therapeutic Activities:    [] (95914 or 89442) Provided verbal/tactile cueing for activities related to improving balance, coordination, kinesthetic sense, posture, motor skill, proprioception and motor activation to allow for proper function of core, proximal hip and LE with self care and ADLs  [] (41962) Gait Re-education- Provided training and instruction to the patient for proper LE, core and proximal hip recruitment and positioning and eccentric body weight control with ambulation re-education including up and down stairs     Home Exercise Program:    [] (93606) Reviewed/Progressed HEP activities related to strengthening, flexibility, endurance, ROM of core, proximal hip and LE for functional self-care, mobility, lifting and ambulation/stair navigation   [] (24790)Reviewed/Progressed HEP activities related to improving balance, coordination, kinesthetic sense, posture, motor skill, proprioception of core, proximal hip and LE for self care, mobility, lifting, and ambulation/stair navigation      Manual Treatments:  PROM / STM / Oscillations-Mobs:  G-I, II, III, IV (PA's, Inf., Post.)  [x] (35925) Provided manual therapy to mobilize LE, proximal hip and/or LS spine soft tissue/joints for the purpose of modulating pain, promoting relaxation,  increasing ROM, reducing/eliminating soft tissue Adjusted  2. Patient will demonstrate increased AROM to L knee 0-110  to allow for proper joint functioning as indicated by patientsability to perform L=R sit to stand with equal weight-bearing,  [x] Progressing: [] Met: [] Not Met: [] Adjusted  3. Patient will demonstrate an increase in Strength to at least 4+/5  as well as good proximal hip strength and control to allow for proper functional mobility as indicated by patients ability to perform reciprocal stairs with 1 rail   [x] Progressing: [] Met: [] Not Met: [] Adjusted  4. Patient will return to functional activities including amb. Up/down ramp without increased symptoms or restriction. [x] Progressing: [] Met: [] Not Met: [] Adjusted  5. Patient to return to work with [employer] and able to work a full schedule with pain 2/10 or less by discharge. [x] Progressing: [] Met: [] Not Met: [] Adjusted       Overall Progression Towards Functional goals/ Treatment Progress Update:  [x] Patient is progressing as expected towards functional goals listed. [] Progression is slowed due to complexities/Impairments listed. [] Progression has been slowed due to co-morbidities. [] Plan just implemented, too soon to assess goals progression <30days   [] Goals require adjustment due to lack of progress  [] Patient is not progressing as expected and requires additional follow up with physician  [] Other    Persisting Functional Limitations/Impairments:  []Sitting [x]Standing   [x]Walking [x]Stairs   []Transfers []ADLs   [x]Squatting/bending [x]Kneeling  []Housework [x]Job related tasks  []Driving [x]Sports/Recreation   []Sleeping []Other:    ASSESSMENT:  Pt demonstrated improved gait pattern with less trendelenburg and lateral sway and improved knee ROM into extension but continues to lack full range both passively and actively. Strength is improving however pt lacks stability on L LE in order to perform single limb stance activities without significant sway.  Continue to work

## 2020-01-28 ENCOUNTER — HOSPITAL ENCOUNTER (OUTPATIENT)
Dept: PHYSICAL THERAPY | Age: 55
Setting detail: THERAPIES SERIES
Discharge: HOME | End: 2020-01-28
Payer: COMMERCIAL

## 2020-01-28 PROCEDURE — 97110 THERAPEUTIC EXERCISES: CPT

## 2020-01-28 NOTE — PROGRESS NOTES
flat initial contact and L LE hip/knee lateral rotation; in supine L lE in Hip lateral rotation with moderate to severe Lateral HS and piriformis tightness  Functional testing Pre hab        Date 11/29/19 Re eval post op   Date :1/2/20 4 week f/u   Date   1/28/20 8 week f/u  Date  D/c            TUG 5.98\" 14.85\" 8.5\"     30 second sit to stand 11 6 9     6 minute walk 466.04 m 298.98 m 377.34 m     Balance        Narrow BLAINE 10 10 10     Semi tandem 10 10 10     Tandem  L 6.71\"  R 3.88\" 1.7\" L 10\" L       SLS 10\" 0\" 6\"     Knee AROM L/R 0-140 L 10-90 L 5-108     Knee Extension MMT R/L L 5   R 4 L 4-/5     L 4+/5     Hip aBduction MMT R/L L 3+  R 4 L 4-/5  L 4/5     LEFS 41; 40-59% 20; 60-79% 39; 52%                    RESTRICTIONS/PRECAUTIONS: R TKR 3/2019    Exercises/Interventions:     Therapeutic Exercise (98700)  Resistance / level Sets/sec Reps Notes / Cues   Supine gluteal isos Verbally educated on HEP as patient had to leave as wife had to go to [place]   HEP   Supine quad isos HEP NMR with towel rolls behind knee   Supine knee extension prop HEP   Ankle pumps HEP   Supine assisted Knee flexion HEP   SLR flexion HEP;    Long sit HS stretch HEP   Towel gastroc stretch HEP   Quad stretch Prone    Standing TKE HEP   Standing HS stretch  Standing hip flexor stretch   Standing hip flexion with SLS  Lateral step ups   Forward step down    Forward step ups     VCs for weight shift onto L LE, B UE support   Quad weakness observed, required B UE support     B UE support  \"   Standing L Knee AAROM stretch on 8\" step  Hamstring stretch on 8 inch step  30\"     30\" X 2 B    x2 B    Standing Runner's stretch    B Heel raises/ B Toe raises zB UE support   Sit to stand chair With B UE support and L=R WBing   Bike  5' minsl 1/13:  Full revolutions    squats B UE support    gastroc step off edge of stairs    LAQ    SciFit     Cybex Leg Press Intermittnet UE support    Mat Sidelying Hip Ab Mat Prone HS curl    Mat Prone hip ext    Mat SAQ    Cable column   Standing TKE   3-way SLR     UE support for balance during L stance    Biodex Cable  Standing TKE  3 way SLR   l     Standing TKE with ball on wall   lacking full extension ROM   Supine hamstring stretch      Supine heel slides   x5 L                         Therapeutic Activities (28356)       11/29/19 Pt was educated on PT POC, Diagnosis, Prognosis, pathomechanics as well as frequency and duration of scheduling future physical therapy appointments. Time was also taken on this day to answer all patient questions and participation in PT.         6 in stairs - forward step up, forward step down    Alternating pattern with unilateral railing support                  Neuromuscular Re-ed (99031)       TKE    Tandem stance     SLS Added 1/22   TRX Squats Added on 1/13   6\" FWD step -up L LE with R foot tap on 4\" steo on side Added on 1/15 Holding stick   6\" FWD step-ups     2\", 4\" FWD step=down Added on 1/15 Holding stick   Biodex Balance Added on 1/15   Ramp 1 rail; decreased control L knee flexion descending   Airex    Manual Intervention (10830)       Knee mobs/PROM      Tib/Fem Mobs      Patella Mobs    Ankle mobs    Knee extension PROM  Ankle on towel roll, lacking 5 actively, 4 passively    Muscle manual stretches    Prone         Pt.  Education:   1/28 - Reassessed goals, discussed progress made and areas remaining for improvement including normalized gait without SPC, increased hip and knee strength, increased knee ROM, and decrease in pain with functional mobility   1/10 - pt educated on performing forward and lateral step-ups for HEP with instruction on how to shift weight to facilitate alternating pattern on stairs   -pt educated on diagnosis, prognosis and expectations for rehab  -pt provided with written and illustrated instructions for HEP  -all pt questions were answered    Therapeutic Exercise and NMR EXR  [x] (85830) Provided lifting and ambulation. Modalities:  [] (31394) Vasopneumatic compression: Utilized vasopneumatic compression to decrease edema / swelling for the purpose of improving mobility and quad tone / recruitment which will allow for increased overall function including but not limited to self-care, transfers, ambulation, and ascending / descending stairs. Modalities:  Patient states will ice at home  1/28, 1/24, 1/22, 1/7, 1/15, 1/13, 1/10: 1/9: CP to L knee in supine x 10 mins     Charges:  Timed Code Treatment Minutes: 30   Total Treatment Minutes: 40     [] EVAL - LOW (03937)   [] EVAL - MOD (00031)  [] EVAL - HIGH (18523)  [] RE-EVAL (88676)  [x] QF(79209) x   2   [] Ionto  [] NMR (47870) x   [] Vaso  [] Manual (58292) x    [] Ultrasound  [] TA x1       [] Mech Traction (33384)  [] Aquatic Therapy x     [] ES (un) (79997):   [] Home Management Training x [] ES(attended) (05760)   [] Group:     [] Other:       GOALS:  Patient stated goal: build quad muscle  [x] Progressing: [] Met: [] Not Met: [] Adjusted    Therapist goals for Patient:   Short Term Goals: To be achieved in: 1 visit  1. Independent in HEP and progression per patient tolerance, in order to prevent re-injury. [x] Progressing: [] Met: [] Not Met: [] Adjusted  2. Patient will have a decrease in pain to facilitate improvement in movement, function, and ADLs as indicated by Functional Deficits. [x] Progressing: [] Met: [] Not Met: [] Adjusted  3. All patient questions regarding expectations for rehab following upcoming surgery are answered. [] Progressing: [x] Met: [] Not Met: [] Adjusted    Long Term Goals:   Long Term Goals: To be achieved in: 6-8 weeks  1. Disability index score of 18% or less for the LEFS to assist with reaching prior level of function. [x] Progressing: [] Met: [] Not Met: [] Adjusted  2.  Patient will demonstrate increased AROM to L knee 0-110  to allow for proper joint functioning as indicated by patientsability to perform L=R sit to stand with equal weight-bearing,  [x] Progressing: [] Met: [] Not Met: [] Adjusted  3. Patient will demonstrate an increase in Strength to at least 4+/5  as well as good proximal hip strength and control to allow for proper functional mobility as indicated by patients ability to perform reciprocal stairs with 1 rail   [] Progressing: [x] Met: [] Not Met: [] Adjusted  4. Patient will return to functional activities including amb. Up/down ramp without increased symptoms or restriction. [x] Progressing: [] Met: [] Not Met: [] Adjusted  5. Patient to return to work with [facility] and able to work a full schedule with pain 2/10 or less by discharge. [x] Progressing: [] Met: [] Not Met: [] Adjusted       Overall Progression Towards Functional goals/ Treatment Progress Update:  [x] Patient is progressing as expected towards functional goals listed. [] Progression is slowed due to complexities/Impairments listed. [] Progression has been slowed due to co-morbidities. [] Plan just implemented, too soon to assess goals progression <30days   [] Goals require adjustment due to lack of progress  [] Patient is not progressing as expected and requires additional follow up with physician  [] Other    Persisting Functional Limitations/Impairments:  []Sitting [x]Standing   [x]Walking [x]Stairs   []Transfers []ADLs   [x]Squatting/bending [x]Kneeling  []Housework [x]Job related tasks  []Driving [x]Sports/Recreation   []Sleeping []Other:    ASSESSMENT:  Pt is a 63 y/o male presenting 4 weeks s/p L TKA decreased knee ROM and LE weakness limiting functional gait/stairs. Pt is progressing towards independence in HEP and decrease in pain during ADL's. As indicated by the LEFS, the patient's disability index has decreased from 75% to 52% allowing him increased functional mobility. Knee AROM and strength has improved resulting in improved sit to stand, gait, and balance.  He is still lacking full AROM and has a mild

## 2020-01-30 ENCOUNTER — APPOINTMENT (OUTPATIENT)
Dept: PHYSICAL THERAPY | Age: 55
End: 2020-01-30
Payer: COMMERCIAL

## 2020-01-30 ENCOUNTER — HOSPITAL ENCOUNTER (OUTPATIENT)
Dept: PHYSICAL THERAPY | Age: 55
Setting detail: THERAPIES SERIES
Discharge: HOME | End: 2020-01-30
Payer: COMMERCIAL

## 2020-01-30 PROCEDURE — 97110 THERAPEUTIC EXERCISES: CPT

## 2020-01-30 PROCEDURE — 97112 NEUROMUSCULAR REEDUCATION: CPT

## 2020-01-30 NOTE — FLOWSHEET NOTE
Knee extension 3 degrees from neutral  1/7:Test Measurements: L Knee flexion seated before tx: 95; after TX seated 110; after Tx L knee AAROM L Knee extension 4 degrees from neutral  1/2: Test Measurements:L HS -38 L quad 60, L gastroc 2; see Table below for other measurements: gait with SPC : decreased L LE weight-bearing with foot flat initial contact and L LE hip/knee lateral rotation; in supine L lE in Hip lateral rotation with moderate to severe Lateral HS and piriformis tightness  Functional testing Pre hab        Date 11/29/19 Re eval post op   Date :1/2/20 4 week f/u   Date   1/28/20 8 week f/u  Date  D/c            TUG 5.98\" 14.85\" 8.5\"     30 second sit to stand 11 6 9     6 minute walk 466.04 m 298.98 m 377.34 m     Balance        Narrow BLAINE 10 10 10     Semi tandem 10 10 10     Tandem  L 6.71\"  R 3.88\" 1.7\" L 10\" L       SLS 10\" 0\" 6\"     Knee AROM L/R 0-140 L 10-90 L 5-108     Knee Extension MMT R/L L 5   R 4 L 4-/5     L 4+/5     Hip aBduction MMT R/L L 3+  R 4 L 4-/5  L 4/5     LEFS 41; 40-59% 20; 60-79% 39; 52%                    RESTRICTIONS/PRECAUTIONS: R TKR 3/2019    Exercises/Interventions:     Therapeutic Exercise (60486)  Resistance / level Sets/sec Reps Notes / Cues   Supine gluteal isos Verbally educated on HEP as patient had to leave as wife had to go to hospital [place]   HEP   Supine quad isos HEP NMR with towel rolls behind knee   Supine knee extension prop HEP   Ankle pumps HEP   Supine assisted Knee flexion HEP   SLR flexion [id number];     Long sit HS stretch HEP   Towel gastroc stretch HEP   Quad stretch Prone    Standing TKE HEP   Standing HS stretch  Standing hip flexor stretch   Standing hip flexion with SLS  Lateral step ups   Forward step down    Forward step ups     VCs for weight shift onto L LE, B UE support   Quad weakness observed, required B UE support     B UE support  \"   Standing L Knee AAROM stretch on 8\" step  Hamstring stretch on 8 inch 4 passively    Muscle manual stretches    Prone         Pt. Education:   1/28 - Reassessed goals, discussed progress made and areas remaining for improvement including normalized gait without SPC, increased hip and knee strength, increased knee ROM, and decrease in pain with functional mobility   1/10 - pt educated on performing forward and lateral step-ups for HEP with instruction on how to shift weight to facilitate alternating pattern on stairs   -pt educated on diagnosis, prognosis and expectations for rehab  -pt provided with written and illustrated instructions for HEP  -all pt questions were answered    Therapeutic Exercise and NMR EXR  [x] (64711) Provided verbal/tactile cueing for activities related to strengthening, flexibility, endurance, ROM for improvements in LE, proximal hip, and core control with self care, mobility, lifting, ambulation.  [] (76152) Provided verbal/tactile cueing for activities related to improving balance, coordination, kinesthetic sense, posture, motor skill, proprioception  to assist with LE, proximal hip, and core control in self care, mobility, lifting, ambulation and eccentric single leg control.      NMR and Therapeutic Activities:    [] (78776 or 65727) Provided verbal/tactile cueing for activities related to improving balance, coordination, kinesthetic sense, posture, motor skill, proprioception and motor activation to allow for proper function of core, proximal hip and LE with self care and ADLs  [] (88124) Gait Re-education- Provided training and instruction to the patient for proper LE, core and proximal hip recruitment and positioning and eccentric body weight control with ambulation re-education including up and down stairs     Home Exercise Program:    [] (63763) Reviewed/Progressed HEP activities related to strengthening, flexibility, endurance, ROM of core, proximal hip and LE for functional self-care, mobility, lifting and ambulation/stair navigation   [] (22001)Reviewed/Progressed HEP activities related to improving balance, coordination, kinesthetic sense, posture, motor skill, proprioception of core, proximal hip and LE for self care, mobility, lifting, and ambulation/stair navigation      Manual Treatments:  PROM / STM / Oscillations-Mobs:  G-I, II, III, IV (PA's, Inf., Post.)  [] (84770) Provided manual therapy to mobilize LE, proximal hip and/or LS spine soft tissue/joints for the purpose of modulating pain, promoting relaxation,  increasing ROM, reducing/eliminating soft tissue swelling/inflammation/restriction, improving soft tissue extensibility and allowing for proper ROM for normal function with self care, mobility, lifting and ambulation. Modalities:  [] (77869) Vasopneumatic compression: Utilized vasopneumatic compression to decrease edema / swelling for the purpose of improving mobility and quad tone / recruitment which will allow for increased overall function including but not limited to self-care, transfers, ambulation, and ascending / descending stairs. Modalities:  Patient states will ice at home  1/30, 1/28, 1/24, 1/22, 1/7, 1/15, 1/13, 1/10: 1/9: CP to L knee in supine x 10 mins     Charges:  Timed Code Treatment Minutes: 50   Total Treatment Minutes: 60     [] EVAL - LOW (08733)   [] EVAL - MOD (98566)  [] EVAL - HIGH (71457)  [] RE-EVAL (91622)  [x] HZ(81319) x   2   [] Ionto  [x] NMR (76287) x1   [] Vaso  [] Manual (32264) x    [] Ultrasound  [] TA x1       [] Mech Traction (31098)  [] Aquatic Therapy x     [] ES (un) (31034):   [] Home Management Training x [] ES(attended) (24117)   [] Group:     [] Other:       GOALS:  Patient stated goal: build quad muscle  [x] Progressing: [] Met: [] Not Met: [] Adjusted    Therapist goals for Patient:   Short Term Goals: To be achieved in: 1 visit  1. Independent in HEP and progression per patient tolerance, in order to prevent re-injury. [x] Progressing: [] Met: [] Not Met: [] Adjusted  2. Patient will have a decrease in pain to facilitate improvement in movement, function, and ADLs as indicated by Functional Deficits. [x] Progressing: [] Met: [] Not Met: [] Adjusted  3. All patient questions regarding expectations for rehab following upcoming surgery are answered. [] Progressing: [x] Met: [] Not Met: [] Adjusted    Long Term Goals:   Long Term Goals: To be achieved in: 6-8 weeks  1. Disability index score of 18% or less for the LEFS to assist with reaching prior level of function. [x] Progressing: [] Met: [] Not Met: [] Adjusted  2. Patient will demonstrate increased AROM to L knee 0-110  to allow for proper joint functioning as indicated by patientsability to perform L=R sit to stand with equal weight-bearing,  [x] Progressing: [] Met: [] Not Met: [] Adjusted  3. Patient will demonstrate an increase in Strength to at least 4+/5  as well as good proximal hip strength and control to allow for proper functional mobility as indicated by patients ability to perform reciprocal stairs with 1 rail   [] Progressing: [x] Met: [] Not Met: [] Adjusted  4. Patient will return to functional activities including amb. Up/down ramp without increased symptoms or restriction. [x] Progressing: [] Met: [] Not Met: [] Adjusted  5. Patient to return to work with [facility] and able to work a full schedule with pain 2/10 or less by discharge. [x] Progressing: [] Met: [] Not Met: [] Adjusted       Overall Progression Towards Functional goals/ Treatment Progress Update:  [x] Patient is progressing as expected towards functional goals listed. [] Progression is slowed due to complexities/Impairments listed. [] Progression has been slowed due to co-morbidities.   [] Plan just implemented, too soon to assess goals progression <30days   [] Goals require adjustment due to lack of progress  [] Patient is not progressing as expected and requires additional follow up with physician  [] Other    Persisting Functional Limitations/Impairments:  []Sitting [x]Standing   [x]Walking [x]Stairs   []Transfers []ADLs   [x]Squatting/bending [x]Kneeling  []Housework [x]Job related tasks  []Driving [x]Sports/Recreation   []Sleeping []Other:    ASSESSMENT:  Pt Knee AROM and strength has improved resulting in improved sit to stand, gait, and balance. He is still lacking full AROM and has a mild swelling present. Patient lacking hip AB strength limiting proper weight-bearing through his L LE with a lateral shift especially when using his cane. .  Patient would benefit from continued therapy to address strength, ROM, balance, and gait deficits in order to return to prior level of function and work a full shift without increases in pain. Treatment/Activity Tolerance:  [x] Pt able to complete treatment [] Patient limited by fatique  [] Patient limited by pain  [] Patient limited by other medical complications  [] Other:     Prognosis:  [x] Good [] Fair  [] Poor    Patient Requires Follow-up: [x] Yes  [] No    Return to Play:    [x]  N/A   []  Stage 1: Intro to Strength   []  Stage 2: Return to Run and Strength   []  Stage 3: Return to Jump and Strength   []  Stage 4: Dynamic Strength and Agility   []  Stage 5: Sport Specific Training     []  Ready to Return to Play, Meets All Above Stages   []  Not Ready for Return to Sports   Comments:            PLAN: See eval. PT 2-3x / week for 4-6 weeks after sx   [x] Continue per plan of care [] Alter current plan (see comments)  [] Plan of care initiated [] Hold pending MD visit [] Discharge    Electronically signed by:Aleah Perry, SPT    Therapist was present, directed the patient's care, made skilled judgement, and was responsible for assessment and treatment of the patient. MICHELINE LYNN PT, DPT      Note: If patient does not return for scheduled/ recommended follow up visits, his note will serve as a discharge from care along with most recent update on progress.

## 2020-02-01 ENCOUNTER — APPOINTMENT (OUTPATIENT)
Dept: PHYSICAL THERAPY | Age: 55
End: 2020-02-01
Payer: COMMERCIAL

## 2020-02-03 ENCOUNTER — OFFICE VISIT (OUTPATIENT)
Dept: ORTHOPEDIC SURGERY | Age: 55
End: 2020-02-03

## 2020-02-03 VITALS — TEMPERATURE: 98.2 F | HEIGHT: 71 IN | WEIGHT: 234 LBS | BODY MASS INDEX: 32.76 KG/M2

## 2020-02-03 PROCEDURE — APPNB15 APP NON BILLABLE TIME 0-15 MINS: Performed by: PHYSICIAN ASSISTANT

## 2020-02-03 PROCEDURE — 99024 POSTOP FOLLOW-UP VISIT: CPT | Performed by: PHYSICIAN ASSISTANT

## 2020-02-03 RX ORDER — HYDROCODONE BITARTRATE AND ACETAMINOPHEN 5; 325 MG/1; MG/1
1 TABLET ORAL EVERY 8 HOURS PRN
Qty: 21 TABLET | Refills: 0 | Status: SHIPPED | OUTPATIENT
Start: 2020-02-03 | End: 2020-02-17 | Stop reason: SDUPTHER

## 2020-02-04 ENCOUNTER — HOSPITAL ENCOUNTER (OUTPATIENT)
Dept: PHYSICAL THERAPY | Age: 55
Setting detail: THERAPIES SERIES
Discharge: HOME | End: 2020-02-04
Payer: COMMERCIAL

## 2020-02-04 PROCEDURE — 97110 THERAPEUTIC EXERCISES: CPT

## 2020-02-04 PROCEDURE — 97140 MANUAL THERAPY 1/> REGIONS: CPT

## 2020-02-04 NOTE — FLOWSHEET NOTE
walker due to plastic from original tip being pushed up into leg, did put on tennis balls   1/20: Test Measurements: L Knee flexion supine before tx: 103; after TX seated 113; after Tx L knee AAROM L Knee extension 3 degrees from neutral  1/7:Test Measurements: L Knee flexion seated before tx: 95; after TX seated 110; after Tx L knee AAROM L Knee extension 4 degrees from neutral  1/2: Test Measurements:L HS -38 L quad 60, L gastroc 2; see Table below for other measurements: gait with SPC : decreased L LE weight-bearing with foot flat initial contact and L LE hip/knee lateral rotation; in supine L lE in Hip lateral rotation with moderate to severe Lateral HS and piriformis tightness  Functional testing Pre hab        Date 11/29/19 Re eval post op   Date :1/2/20 4 week f/u   Date   1/28/20 8 week f/u  Date  D/c            TUG 5.98\" 14.85\" 8.5\"     30 second sit to stand 11 6 9     6 minute walk 466.04 m 298.98 m 377.34 m     Balance        Narrow BLAINE 10 10 10     Semi tandem 10 10 10     Tandem  L 6.71\"  R 3.88\" 1.7\" L 10\" L       SLS 10\" 0\" 6\"     Knee AROM L/R 0-140 L 10-90 L 5-108     Knee Extension MMT R/L L 5   R 4 L 4-/5     L 4+/5     Hip aBduction MMT R/L L 3+  R 4 L 4-/5  L 4/5     LEFS 41; 40-59% 20; 60-79% 39; 52%        RESTRICTIONS/PRECAUTIONS: R TKR 3/2019    Exercises/Interventions:     Therapeutic Exercise (23336) - 33' Resistance / level Sets/sec Reps Notes / Cues   Supine gluteal isos Verbally educated on HEP as patient had to leave as wife had to go to hospital [place]   HEP   Supine quad isos HEP NMR with towel rolls behind knee   Supine knee extension prop HEP   Ankle pumps HEP   Supine assisted Knee flexion HEP   SLR flexion [id number];    IB Gastroc  IB HSS  60''  2 x 30''  2/4 added  2/4 added   Long sit HS stretch HEP   Towel gastroc stretch HEP   Quad stretch Prone    Standing TKE Blue band 1 15 L  2/4 - re-intro'd, added ankle DF   Standing HS stretch  Standing complexities/Impairments listed. [] Progression has been slowed due to co-morbidities. [] Plan just implemented, too soon to assess goals progression <30days   [] Goals require adjustment due to lack of progress  [] Patient is not progressing as expected and requires additional follow up with physician  [] Other    Persisting Functional Limitations/Impairments:  []Sitting [x]Standing   [x]Walking [x]Stairs   []Transfers []ADLs   [x]Squatting/bending [x]Kneeling  []Housework [x]Job related tasks  []Driving [x]Sports/Recreation   []Sleeping []Other:    ASSESSMENT:  The patient's knee flexion ROM is improving, but extension remains limited. The patient needs to increase compliance of HEP at home in order to obtain more rapid results with strength, ROM. The patient was encouraged to stop using pillow under knee when sleeping to improve knee extension. The patient remains weak in hip abduction strength in OKC/CKC at this time, and was challenged by today's simple, yet effective session.     Treatment/Activity Tolerance:  [x] Pt able to complete treatment [] Patient limited by fatique  [] Patient limited by pain  [] Patient limited by other medical complications  [] Other:     Prognosis:  [x] Good [] Fair  [] Poor    Patient Requires Follow-up: [x] Yes  [] No    Return to Play:    [x]  N/A   []  Stage 1: Intro to Strength   []  Stage 2: Return to Run and Strength   []  Stage 3: Return to Jump and Strength   []  Stage 4: Dynamic Strength and Agility   []  Stage 5: Sport Specific Training     []  Ready to Return to Play, Meets All Above Stages   []  Not Ready for Return to Sports   Comments:            PLAN: See eval. PT 2-3x / week for 4-6 weeks after sx   [x] Continue per plan of care [] Alter current plan (see comments)  [] Plan of care initiated [] Hold pending MD visit [] Discharge    Electronically signed by:   Kylie Kamara PT, DPT    Note: If patient does not return for scheduled/ recommended follow up visits,

## 2020-02-08 ENCOUNTER — HOSPITAL ENCOUNTER (OUTPATIENT)
Dept: PHYSICAL THERAPY | Age: 55
Setting detail: THERAPIES SERIES
Discharge: HOME | End: 2020-02-08
Payer: COMMERCIAL

## 2020-02-08 ENCOUNTER — APPOINTMENT (OUTPATIENT)
Dept: PHYSICAL THERAPY | Age: 55
End: 2020-02-08
Payer: COMMERCIAL

## 2020-02-08 PROCEDURE — 97110 THERAPEUTIC EXERCISES: CPT

## 2020-02-08 PROCEDURE — 97140 MANUAL THERAPY 1/> REGIONS: CPT

## 2020-02-08 PROCEDURE — 97112 NEUROMUSCULAR REEDUCATION: CPT

## 2020-02-08 NOTE — FLOWSHEET NOTE
piriformis/LateralHS tightness; 2/4: 113 deg flexion AAROM; lack 4 deg ext AROM  1/28: see chart below  1/24: PROM of L knee lacking 4 deg ext   1/21: Amb.  With [facility] with decreasing wide BLAINE but limited knee flexion at terminal stance  1/17: L knee 110 deg to lacking 7 PROM  1/15:ROM not tested  1/13:L knee 5 degrees from fulll terminal knee extension; after stretches/manual 3 degrees from terminal ext  1/9: unable to get gliders on bottom of walker due to plastic from original tip being pushed up into leg, did put on tennis balls   1/20: Test Measurements: L Knee flexion supine before tx: 103; after TX seated 113; after Tx L knee AAROM L Knee extension 3 degrees from neutral  1/7:Test Measurements: L Knee flexion seated before tx: 95; after TX seated 110; after Tx L knee AAROM L Knee extension 4 degrees from neutral  1/2: Test Measurements:L HS -38 L quad 60, L gastroc 2; see Table below for other measurements: gait with SPC : decreased L LE weight-bearing with foot flat initial contact and L LE hip/knee lateral rotation; in supine L lE in Hip lateral rotation with moderate to severe Lateral HS and piriformis tightness  Functional testing Pre hab        Date 11/29/19 Re eval post op   Date :1/2/20  ~ 4 weeks post-op ~8 week  week f/u   Date   1/28/20 8 week f/u  Date  D/C           TUG 5.98\" 14.85\" 8.5\"     30 second sit to stand 11 6 9     6 minute walk 466.04 m 298.98 m 377.34 m     Balance        Narrow BLAINE 10 10 10     Semi tandem 10 10 10     Tandem  L 6.71\"  R 3.88\" 1.7\" L 10\" L       SLS 10\" 0\" 6\"     Knee AROM L/R 0-140 L 10-90 L 5-108     Knee Extension MMT R/L L 5   R 4 L 4-/5     L 4+/5     Hip aBduction MMT R/L L 3+  R 4 L 4-/5  L 4/5     LEFS 41; 40-59% 20; 60-79% 39; 52%        RESTRICTIONS/PRECAUTIONS: R TKR 3/2019    Exercises/Interventions:     Therapeutic Exercise (55057) - 33' Resistance / level Sets/sec Reps Notes / Cues   Supine gluteal isos    HEP   Supine quad isos HEP NMR with towel rolls behind knee   Supine knee extension prop HEP   Ankle pumps HEP   Supine assisted Knee flexion HEP   SLR flexion [id number];    IB Gastroc  IB HSS  60''  2 x 30''  2/4 added  2/4 added   Long sit HS stretch HEP   Towel gastroc stretch HEP   Quad stretch Prone    Standing TKE Blue band 1 15 L  2/4 - re-intro'd, added ankle DF   Standing HS stretch  Standing hip flexor stretch   Standing hip flexion with SLS  Lateral step ups   Forward step down    Forward step ups 12\" step6\" step30 secX 2 Bx10 leading R         VCs for weight shift onto L LE, B UE support   Quad weakness observed, required B UE support     B UE support  \"   Standing L Knee AAROM stretch on 8\" step  Hamstring stretch on 8 inch step  Standing Runner's stretch    B Heel raises/ B Toe raises zB UE support   Sit to stand chair With B UE support and L=R WBing   Recumbent Bike L1 3' minsl    squats B UE support    gastroc step off edge of stairs    LAQ    SciFit     Cybex Leg Press Intermittnet UE support    Mat Sidelying Hip Ab 3#   Mat Prone HS curl    Mat Prone hip ext 3#   Mat SAQ    Cable column   Standing TKE   3-way SLR     UE support for balance during L stance    Biodex Cable  Standing TKE  3 way SLR   l     Standing TKE with ball on wall   lacking full extension ROM   Supine hamstring stretch      Supine heel slides       Leg Press B 75#   L 40# 3  2 10  10 2/4 - progressed weight/sets, B2 L5   Standing SLR Abd lime 2 10, OKC/CKC R/L ea 2/4 - added          Therapeutic Activities (78180) 4'       11/29/19 Pt was educated on PT POC, Diagnosis, Prognosis, pathomechanics as well as frequency and duration of scheduling future physical therapy appointments.  Time was also taken on this day to answer all patient questions and participation in PT.         6 in stairs - forward step up to SLS 90/90 position, retro  step down (RLE never touches step)    2/4 - modified; cued to increase L quad activation and L hipextension                 Neuromuscular Re-ed (23111)       TKE    Tandem stance  floor 30\" 2 L/R    SLS Added 1/22   TRX Squats Added on 1/13   6\" FWD step -up L LE with R foot tap on 4\" steo on side Added on 1/15 Holding stick   6\" FWD step-ups  2 10 L/R   2\", 4\" FWD step=down 1  1 12 L LE   12 L LE Added on 1/15 Holding stick   Biodex Balance Added on 1/15   Ramp 1 rail; decreased control L knee flexion descending   Bungee walking Lateral    Bungee Walking Forward    Bungee wa;lking Backward Added 1/30 for NMR; Requires CGA-Min A at times due to quad/hip weakness       Airex    Manual Intervention (07518) - 12'       Prone Quad Stretch      Knee mobs/PROM  Supine PA glides Grade II femur followed witPROM L knee ext 5\" X8;    Tib/Fem Mobs      Patella Mobs    Ankle mobs    Knee extension/flexion PROM  Ankle on towel roll, lacking 5 actively, 4 passively    Muscle manual stretches    Prone PA glide to L proximal fibular joint Grade III fiollowed with L passive DF and gastroc stretches;  STM to Distal lateral HS/proximal lateral gastroc       Pt.  Education:   1/28 - Reassessed goals, discussed progress made and areas remaining for improvement including normalized gait without SPC, increased hip and knee strength, increased knee ROM, and decrease in pain with functional mobility   1/10 - pt educated on performing forward and lateral step-ups for HEP with instruction on how to shift weight to facilitate alternating pattern on stairs   -pt educated on diagnosis, prognosis and expectations for rehab  -pt provided with written and illustrated instructions for HEP  -all pt questions were answered    Therapeutic Exercise and NMR EXR  [x] (06269) Provided verbal/tactile cueing for activities related to strengthening, flexibility, endurance, ROM for improvements in LE, proximal hip, and core control with self care, mobility, lifting, ambulation.  [] (38901) Provided verbal/tactile cueing for activities related to improving balance, coordination, kinesthetic sense, posture, motor skill, proprioception  to assist with LE, proximal hip, and core control in self care, mobility, lifting, ambulation and eccentric single leg control. NMR and Therapeutic Activities:    [x] (68044 or 19238) Provided verbal/tactile cueing for activities related to improving balance, coordination, kinesthetic sense, posture, motor skill, proprioception and motor activation to allow for proper function of core, proximal hip and LE with self care and ADLs  [] (91409) Gait Re-education- Provided training and instruction to the patient for proper LE, core and proximal hip recruitment and positioning and eccentric body weight control with ambulation re-education including up and down stairs     Home Exercise Program:    [] (85435) Reviewed/Progressed HEP activities related to strengthening, flexibility, endurance, ROM of core, proximal hip and LE for functional self-care, mobility, lifting and ambulation/stair navigation   [] (43177)Reviewed/Progressed HEP activities related to improving balance, coordination, kinesthetic sense, posture, motor skill, proprioception of core, proximal hip and LE for self care, mobility, lifting, and ambulation/stair navigation      Manual Treatments:  PROM / STM / Oscillations-Mobs:  G-I, II, III, IV (PA's, Inf., Post.)  [x] (47488) Provided manual therapy to mobilize LE, proximal hip and/or LS spine soft tissue/joints for the purpose of modulating pain, promoting relaxation,  increasing ROM, reducing/eliminating soft tissue swelling/inflammation/restriction, improving soft tissue extensibility and allowing for proper ROM for normal function with self care, mobility, lifting and ambulation.      Modalities:  [] (76536) Vasopneumatic compression: Utilized vasopneumatic compression to decrease edema / swelling for the purpose of improving mobility and quad tone / recruitment which will allow for increased overall function including but not limited to self-care, transfers,

## 2020-02-11 ENCOUNTER — HOSPITAL ENCOUNTER (OUTPATIENT)
Dept: PHYSICAL THERAPY | Age: 55
Setting detail: THERAPIES SERIES
Discharge: HOME | End: 2020-02-11
Payer: COMMERCIAL

## 2020-02-11 PROCEDURE — 97110 THERAPEUTIC EXERCISES: CPT

## 2020-02-11 NOTE — FLOWSHEET NOTE
stairs - forward step up to SLS 90/90 position, retro  step down (RLE never touches step)    2/4 - modified; cued to increase L quad activation and L hipextension   Gait without SPC  X 300+ feet  Heavy VCs for glute activation to avoid trendelenburg           Neuromuscular Re-ed (00471)       TKE    Tandem stance  floor 30\" 2 L/R    SLS Added 1/22   TRX Squats Added on 1/13   6\" FWD step -up L LE with R foot tap on 4\" steo on side Added on 1/15 Holding stick   6\" FWD step-ups  2 10 L/R   2\", 4\" FWD step=down 1  1 12 L LE   12 L LE Added on 1/15 Holding stick   Biodex Balance Added on 1/15   Ramp 1 rail; decreased control L knee flexion descending   Bungee walking Lateral    Bungee Walking Forward    Bungee wa;lking Backward Added 1/30 for NMR; Requires CGA-Min A at times due to quad/hip weakness       Airex    Manual Intervention (72330)        Prone Quad Stretch      Knee mobs/PROM  Supine PA glides Grade II femur followed witPROM L knee ext 5\" X8;    Tib/Fem Mobs      Patella Mobs    Ankle mobs    Knee extension/flexion PROM  Ankle on towel roll, lacking 5 actively, 4 passively    Muscle manual stretches    Prone        Pt.  Education:   1/28 - Reassessed goals, discussed progress made and areas remaining for improvement including normalized gait without SPC, increased hip and knee strength, increased knee ROM, and decrease in pain with functional mobility   1/10 - pt educated on performing forward and lateral step-ups for HEP with instruction on how to shift weight to facilitate alternating pattern on stairs   -pt educated on diagnosis, prognosis and expectations for rehab  -pt provided with written and illustrated instructions for HEP  -all pt questions were answered    Therapeutic Exercise and NMR EXR  [x] (69751) Provided verbal/tactile cueing for activities related to strengthening, flexibility, endurance, ROM for improvements in LE, proximal hip, and core control with self care, mobility, lifting, and quad tone / recruitment which will allow for increased overall function including but not limited to self-care, transfers, ambulation, and ascending / descending stairs. Modalities:  Patient states will ice at home  2/8, 1/30, 1/28, 1/24, 1/22, 1/7, 1/15, 1/13, 1/10: 1/9: CP to L knee in supine x 10 mins     Charges:  Timed Code Treatment Minutes: 34   Total Treatment Minutes: 34     [] EVAL - LOW (14676)   [] EVAL - MOD (34589)  [] EVAL - HIGH (82770)  [] RE-EVAL (76404)  [x] BR(37173) x   2   [] Ionto  [] NMR (97986)    [] Vaso  [] Manual (94169)           [] Ultrasound  [] TA x1       [] Mech Traction (17678)  [] Aquatic Therapy x     [] ES (un) (96210):   [] Home Management Training x [] ES(attended) (81987)   [] Group:     [] Other:       GOALS:  Patient stated goal: build quad muscle  [x] Progressing: [] Met: [] Not Met: [] Adjusted    Therapist goals for Patient:   Short Term Goals: To be achieved in: 1 visit  1. Independent in HEP and progression per patient tolerance, in order to prevent re-injury. [x] Progressing: [] Met: [] Not Met: [] Adjusted  2. Patient will have a decrease in pain to facilitate improvement in movement, function, and ADLs as indicated by Functional Deficits. [x] Progressing: [] Met: [] Not Met: [] Adjusted  3. All patient questions regarding expectations for rehab following upcoming surgery are answered. [] Progressing: [x] Met: [] Not Met: [] Adjusted    Long Term Goals:   Long Term Goals: To be achieved in: 6-8 weeks  1. Disability index score of 18% or less for the LEFS to assist with reaching prior level of function. [x] Progressing: [] Met: [] Not Met: [] Adjusted  2. Patient will demonstrate increased AROM to L knee 0-110  to allow for proper joint functioning as indicated by patientsability to perform L=R sit to stand with equal weight-bearing,  [x] Progressing: [] Met: [] Not Met: [] Adjusted  3.  Patient will demonstrate an increase in Strength to at least 4+/5 as well as good proximal hip strength and control to allow for proper functional mobility as indicated by patients ability to perform reciprocal stairs with 1 rail   [] Progressing: [x] Met: [] Not Met: [] Adjusted  4. Patient will return to functional activities including amb. Up/down ramp without increased symptoms or restriction. [x] Progressing: [] Met: [] Not Met: [] Adjusted  5. Patient to return to work with [facility] and able to work a full schedule with pain 2/10 or less by discharge. [x] Progressing: [] Met: [] Not Met: [] Adjusted      Overall Progression Towards Functional goals/ Treatment Progress Update:  [x] Patient is progressing as expected towards functional goals listed. [] Progression is slowed due to complexities/Impairments listed. [] Progression has been slowed due to co-morbidities. [] Plan just implemented, too soon to assess goals progression <30days   [] Goals require adjustment due to lack of progress  [] Patient is not progressing as expected and requires additional follow up with physician  [] Other    Persisting Functional Limitations/Impairments:  []Sitting [x]Standing   [x]Walking [x]Stairs   []Transfers []ADLs   [x]Squatting/bending [x]Kneeling  []Housework [x]Job related tasks  []Driving [x]Sports/Recreation   []Sleeping []Other:    ASSESSMENT:  Pt continues to demo moderate weakness in glute med and quad on L which is allowing an altered gait pattern. Concerned about hip ER tightness as well which is pulling foot into toe out. Pt struggles to bear weight on L LE full, anastasia with knee flexion which is a large part of funcitonal movement. Pt requires max encouragement to complete reciprocal pattern on stairs and proper gait mechanics. Continue with strength, AROM, and functional mobility training.        Treatment/Activity Tolerance:  [x] Pt able to complete treatment [] Patient limited by fatique  [] Patient limited by pain  [] Patient limited by other medical complications  []

## 2020-02-13 ENCOUNTER — HOSPITAL ENCOUNTER (OUTPATIENT)
Dept: PHYSICAL THERAPY | Age: 55
Setting detail: THERAPIES SERIES
Discharge: HOME | End: 2020-02-13
Payer: COMMERCIAL

## 2020-02-13 PROCEDURE — 97110 THERAPEUTIC EXERCISES: CPT

## 2020-02-13 NOTE — FLOWSHEET NOTE
PT.         6 in stairs - forward step up to SLS 90/90 position, retro  step down (RLE never touches step)    2/4 - modified; cued to increase L quad activation and L hipextension   Gait without SPC  Gait without SPC with mirror feedback   100 feet Heavy VCs for glute activation to avoid trendelenburg   VC's for upright posture, glute activation, heel toe pattern and quicker pace                Neuromuscular Re-ed (31389)       TKE    Tandem stance  floor 30\" 2 L/R    SLS Added 1/22   TRX Squats Added on 1/13   6\" FWD step -up L LE with R foot tap on 4\" steo on side Added on 1/15 Holding stick   6\" FWD step-ups  L/R   2\", 4\" FWD step=down Added on 1/15 Holding stick   Biodex Balance Added on 1/15   Ramp 1 rail; decreased control L knee flexion descending   Bungee walking Lateral    Bungee Walking Forward    Bungee walking Backward Added 1/30 for NMR; Requires CGA-Min A at times due to quad/hip weakness   Rebounders on airex   SLS  3 lb med ball     X 10 B     Airex    Manual Intervention (46636)        Prone Quad Stretch      Knee mobs/PROM     Tib/Fem Mobs      Patella Mobs    Ankle mobs    Knee extension/flexion PROM  Ankle on towel roll, lacking 5 actively, 4 passively    Muscle manual stretches    Prone        Pt.  Education:   1/28 - Reassessed goals, discussed progress made and areas remaining for improvement including normalized gait without SPC, increased hip and knee strength, increased knee ROM, and decrease in pain with functional mobility   1/10 - pt educated on performing forward and lateral step-ups for HEP with instruction on how to shift weight to facilitate alternating pattern on stairs   -pt educated on diagnosis, prognosis and expectations for rehab  -pt provided with written and illustrated instructions for HEP  -all pt questions were answered    Therapeutic Exercise and NMR EXR  [x] (11645) Provided verbal/tactile cueing for activities related to strengthening, flexibility, endurance, ROM for vasopneumatic compression to decrease edema / swelling for the purpose of improving mobility and quad tone / recruitment which will allow for increased overall function including but not limited to self-care, transfers, ambulation, and ascending / descending stairs. Modalities:  Patient states will ice at home  2/13, 2/8, 1/30, 1/28, 1/24, 1/22, 1/7, 1/15, 1/13, 1/10: 1/9: CP to L knee in supine x 10 mins     Charges:  Timed Code Treatment Minutes: 28   Total Treatment Minutes: 38     [] EVAL - LOW (48648)   [] EVAL - MOD (59337)  [] EVAL - HIGH (24768)  [] RE-EVAL (57158)  [x] LH(27620) x   2   [] Ionto  [] NMR (39920)    [] Vaso  [] Manual (01621)           [] Ultrasound  [] TA x1       [] Mech Traction (41670)  [] Aquatic Therapy x     [] ES (un) (96333):   [] Home Management Training x [] ES(attended) (11883)   [] Group:     [] Other:       GOALS:  Patient stated goal: build quad muscle  [x] Progressing: [] Met: [] Not Met: [] Adjusted    Therapist goals for Patient:   Short Term Goals: To be achieved in: 1 visit  1. Independent in HEP and progression per patient tolerance, in order to prevent re-injury. [x] Progressing: [] Met: [] Not Met: [] Adjusted  2. Patient will have a decrease in pain to facilitate improvement in movement, function, and ADLs as indicated by Functional Deficits. [x] Progressing: [] Met: [] Not Met: [] Adjusted  3. All patient questions regarding expectations for rehab following upcoming surgery are answered. [] Progressing: [x] Met: [] Not Met: [] Adjusted    Long Term Goals:   Long Term Goals: To be achieved in: 6-8 weeks  1. Disability index score of 18% or less for the LEFS to assist with reaching prior level of function. [x] Progressing: [] Met: [] Not Met: [] Adjusted  2.  Patient will demonstrate increased AROM to L knee 0-110  to allow for proper joint functioning as indicated by patientsability to perform L=R sit to stand with equal weight-bearing,  [x] Progressing: [] complications  [] Other:     Prognosis:  [x] Good [] Fair  [] Poor    Patient Requires Follow-up: [x] Yes  [] No    Return to Play:    [x]  N/A   []  Stage 1: Intro to Strength   []  Stage 2: Return to Run and Strength   []  Stage 3: Return to Jump and Strength   []  Stage 4: Dynamic Strength and Agility   []  Stage 5: Sport Specific Training     []  Ready to Return to Play, Meets All Above Stages   []  Not Ready for Return to Sports   Comments:            PLAN: See eval. PT 2-3x / week for 4-6 weeks after sx   [x] Continue per plan of care [] Alter current plan (see comments)  [] Plan of care initiated [] Hold pending MD visit [] Discharge    Electronically signed by: Denny Bay, MENDOZA    Therapist was present, directed the patient's care, made skilled judgement, and was responsible for assessment and treatment of the patient. Maria Elena Gallagher PT, DPT    Note: If patient does not return for scheduled/ recommended follow up visits, his note will serve as a discharge from care along with most recent update on progress.

## 2020-02-17 ENCOUNTER — APPOINTMENT (OUTPATIENT)
Dept: PHYSICAL THERAPY | Age: 55
End: 2020-02-17
Payer: COMMERCIAL

## 2020-02-17 ENCOUNTER — TELEPHONE (OUTPATIENT)
Dept: ORTHOPEDIC SURGERY | Age: 55
End: 2020-02-17

## 2020-02-17 RX ORDER — HYDROCODONE BITARTRATE AND ACETAMINOPHEN 5; 325 MG/1; MG/1
1 TABLET ORAL 2 TIMES DAILY
Qty: 14 TABLET | Refills: 0 | Status: SHIPPED | OUTPATIENT
Start: 2020-02-17 | End: 2020-02-27 | Stop reason: SDUPTHER

## 2020-02-19 ENCOUNTER — APPOINTMENT (OUTPATIENT)
Dept: PHYSICAL THERAPY | Age: 55
End: 2020-02-19
Payer: COMMERCIAL

## 2020-02-20 ENCOUNTER — HOSPITAL ENCOUNTER (OUTPATIENT)
Dept: PHYSICAL THERAPY | Age: 55
Setting detail: THERAPIES SERIES
Discharge: HOME | End: 2020-02-20
Payer: COMMERCIAL

## 2020-02-20 PROCEDURE — 97112 NEUROMUSCULAR REEDUCATION: CPT

## 2020-02-20 PROCEDURE — 97110 THERAPEUTIC EXERCISES: CPT

## 2020-02-20 NOTE — FLOWSHEET NOTE
[facility] [facility]       Physical Therapy Treatment Note     Date:  2020    Patient Name:  Joaquim Ortiz    :  1965  MRN: [id number]  Restrictions/Precautions:    Medical/Treatment Diagnosis Information:  · Diagnosis: L knee OA M17.12  · Treatment Diagnosis: L LE decreased Knee ROM and l LE weakness Limiting Functional Gait/Stairs  Insurance/Certification information:  PT Insurance Information: BCBS; pre-authorization needed  Physician Information:  Referring Practitioner: Marilyn Sheehan  Plan of care signed (Y/N): [x]  Yes []  No     Date of Patient follow up with Physician: amy BECK 3/23/2020     Progress Report: []  Yes  [x]  No     Date Range for reporting period:  Beginnin20  Ending: re-eval on 20    Progress report due (10 Rx/or 30 days whichever is less):     Recertification due (POC duration/ or 90 days whichever is less):     Visit # Insurance Allowable Auth required? Date Range    Preauthorize? []  Yes  [x]  No To be determined     Latex Allergy:  [x]NO      []YES  Preferred Language for Healthcare:   [x]English       []other:    Functional Scale: LEFS: 39/80 52% impaired  Date assessed: 20    Pain level: 4/10; stiffness/soreness      SUBJECTIVE:  Patient reports he is still having knee discomfort. 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 He changed to a new school and walks around more at this job versus sitting.       OBJECTIVE:   : Patient performs sit to stand with L Hip ER/toe out as well as with ambulation likely due to decreased hip AB/quad strength  : pt amb with large angle of toe out on L, quads remain weak limiting ability to preform single leg squat and descending stairs, also maintain trendelenburg pattern which improved with cues for glute activation; concerned about hip ER tightness which is most likely cause of toe out on L - this PT post-operatively in the hospital and emphasis on working with both physical therapy and nursing staff to achieve ambulation goal of 150 feet was provided. The patient was highly encouraged to attend joint class in hospital prior to surgery for further instructions on pre and post-surgical care. Also, education regarding goals and expectations was provided; specifically, knee flexion range of motion greater than or equal to 90 degrees and 0 degrees knee extension to promote improved gait mechanics and ambulation. The patient was encouraged to utilize ice/cold pack after surgery to address pain, minimize swelling as often as possible.  It is in my medical opinion that this patient is clear from all physical barriers prior to consideration for surgery, activity modifications prior to and post operatively have been discussed with this patient as well as discharge planning and is cleared for surgery from physical therapy [id number]' Functional testing Pre hab        Date 11/29/19 Re eval post op   Date :1/2/20  ~ 4 weeks post-op ~8 week  week f/u   Date   1/28/20 12 week f/u  Date : 2/25 D/C           TUG 5.98\" 14.85\" 8.5\"     30 second sit to stand 11 6 9     6 minute walk 466.04 m 298.98 m 377.34 m     Balance        Narrow BLAINE 10 10 10     Semi tandem 10 10 10     Tandem  L 6.71\"  R 3.88\" 1.7\" L 10\" L       SLS 10\" 0\" 6\"     Knee AROM L/R 0-140 L 10-90 L 5-108     Knee Extension MMT R/L L 5   R 4 L 4-/5     L 4+/5     Hip aBduction MMT R/L L 3+  R 4 L 4-/5  L 4/5     LEFS 41; 40-59% 20; 60-79% 39; 52%        RESTRICTIONS/PRECAUTIONS: R TKR 3/2019    Exercises/Interventions:     Therapeutic Exercise (27534) - 27' Resistance / level Sets/sec Reps Notes / Cues   Supine gluteal isos    HEP   Supine quad isos HEP NMR with towel rolls behind knee   Supine knee extension prop HEP   Ankle pumps HEP   Supine assisted Knee flexion HEP   SLR flexion HEP;    IB Gastroc  IB soleus stretch  IB HSS 2 X60''  2 X30\"  2 x 30'3  3  32/4 added  2/4 added   Long sit HS stretch HEP   Towel gastroc stretch HEP   Quad stretch Prone    Standing TKE 2/4 - re-intro'd, added ankle DF   Standing HS stretch  Standing hip flexor stretch   Standing hip flexion with SLS  Lateral step ups   Forward step down    Forward step ups     VCs for weight shift onto L LE, B UE support   Quad weakness observed, required B UE support     B UE support  \"   Standing L Knee AAROM stretch on 8\" step  Hamstring stretch on 8 inch step  Standing Runner's stretch    B Heel raises/ B Toe raises 2 10 each  zB UE support   Sit to stand chair 2 X5 reps without UE support without Hip ER 2/20  Recumbent Bike L1 l    squats B UE support    gastroc step off edge of stairs    LAQ    SciFit     Cybex Leg Press Intermittnet UE support    Mat Sidelying Hip Ab 3#   Mat Prone HS curl    Mat Prone hip ext 3#   Mat SAQ    Cable column   Standing TKE   3-way SLR     UE support for balance during L stance    Biodex Cable  Standing TKE  3 way SLR   l    Standing TKE with ball on wall   lacking full extension ROM   Supine hamstring stretch      Supine heel slides    Leg Press 2/4 - progressed weight/sets, B2 L5   Standing SLR Abd 2/4 - added   HEALTHPLEX   Ascend/descend stairs  TM    Squat machine  Leg press  single leg press  Ham curl  Leg ext  ecc leg ext L  TRX squats  TRX single leg attempted  Squat with alt leg lift  Lateral band walk  Sit to stand with 3 taps on medium plyobox    1 UE support, poor ecc control on L  B UE support to no UE support, VCs for tight glutes and heel/toe pattern                    VCs for maintaining squat   Bike    5 min     TG squats   SL squats  Hip abduction    2X 10   2X 10 B   2X 5 B     Lateral dip 4\"  2 X10 NMRVarying UE support from B to 2 finger tips    Steps ups with marches 8\" box   X 15 R, 15L  NMR, Difficulty with L stance, requiring intermittent UE support    Standing Marches opp  Parallel Bars X10 each NMR, Added 2/20 no UE   Standing Hip assist with reaching prior level of function. [x] Progressing: [] Met: [] Not Met: [] Adjusted  2. Patient will demonstrate increased AROM to L knee 0-110  to allow for proper joint functioning as indicated by patientsability to perform L=R sit to stand with equal weight-bearing,  [x] Progressing: [] Met: [] Not Met: [] Adjusted  3. Patient will demonstrate an increase in Strength to at least 4+/5  as well as good proximal hip strength and control to allow for proper functional mobility as indicated by patients ability to perform reciprocal stairs with 1 rail   [] Progressing: [x] Met: [] Not Met: [] Adjusted  4. Patient will return to functional activities including amb. Up/down ramp without increased symptoms or restriction. [x] Progressing: [] Met: [] Not Met: [] Adjusted  5. Patient to return to work with [employer] and able to work a full schedule with pain 2/10 or less by discharge. [x] Progressing: [] Met: [] Not Met: [] Adjusted      Overall Progression Towards Functional goals/ Treatment Progress Update:  [x] Patient is progressing as expected towards functional goals listed. [] Progression is slowed due to complexities/Impairments listed. [] Progression has been slowed due to co-morbidities. [] Plan just implemented, too soon to assess goals progression <30days   [] Goals require adjustment due to lack of progress  [] Patient is not progressing as expected and requires additional follow up with physician  [] Other    Persisting Functional Limitations/Impairments:  []Sitting [x]Standing   [x]Walking [x]Stairs   []Transfers []ADLs   [x]Squatting/bending [x]Kneeling  []Housework [x]Job related tasks  []Driving [x]Sports/Recreation   []Sleeping []Other:    ASSESSMENT:  Pt is slowly starting to improve with single limb balance stability on the L LE as he improves with NMR of Hip AB/LE musculature.   He still has lateral trunk lean during gait but after NMR this date he has decreased the ROM of the compensation by at least 50%. Continue to strengthen B hips and knees and improve balance in order to improve gait and functional mobility without pain. Treatment/Activity Tolerance:  [x] Pt able to complete treatment [] Patient limited by fatique  [] Patient limited by pain  [] Patient limited by other medical complications  [] Other:     Prognosis:  [x] Good [] Fair  [] Poor    Patient Requires Follow-up: [x] Yes  [] No    Return to Play:    [x]  N/A   []  Stage 1: Intro to Strength   []  Stage 2: Return to Run and Strength   []  Stage 3: Return to Jump and Strength   []  Stage 4: Dynamic Strength and Agility   []  Stage 5: Sport Specific Training     []  Ready to Return to Play, Meets All Above Stages   []  Not Ready for Return to Sports   Comments:            PLAN: See eval. PT 2-3x / week for 4-6 weeks after sx   [x] Continue per plan of care [] Alter current plan (see comments)  [] Plan of care initiated [] Hold pending MD visit [] Discharge    Electronically signed by:  Latonya Anton PT, MSPT    Note: If patient does not return for scheduled/ recommended follow up visits, his note will serve as a discharge from care along with most recent update on progress.

## 2020-02-22 ENCOUNTER — HOSPITAL ENCOUNTER (OUTPATIENT)
Dept: PHYSICAL THERAPY | Age: 55
Setting detail: THERAPIES SERIES
Discharge: HOME | End: 2020-02-22
Payer: COMMERCIAL

## 2020-02-22 PROCEDURE — 97530 THERAPEUTIC ACTIVITIES: CPT

## 2020-02-22 PROCEDURE — 97110 THERAPEUTIC EXERCISES: CPT

## 2020-02-22 NOTE — FLOWSHEET NOTE
[address] [facility]       Physical Therapy Treatment Note     Date:  2020    Patient Name:  Osmel Raya    :  1965  MRN: [id number]  Restrictions/Precautions:    Medical/Treatment Diagnosis Information:  · Diagnosis: L knee OA M17.12  · Treatment Diagnosis: L LE decreased Knee ROM and l LE weakness Limiting Functional Gait/Stairs  Insurance/Certification information:  PT Insurance Information: BCBS; pre-authorization needed  Physician Information:  Referring Practitioner: Yola Miller  Plan of care signed (Y/N): [x]  Yes []  No     Date of Patient follow up with Physician: amy BECK 3/23/2020     Progress Report: []  Yes  [x]  No     Date Range for reporting period:  Beginnin20  Ending: re-eval on 20    Progress report due (10 Rx/or 30 days whichever is less):     Recertification due (POC duration/ or 90 days whichever is less):     Visit # Insurance Allowable Auth required? Date Range   1 + 15/18 Preauthorize? []  Yes  [x]  No To be determined     Latex Allergy:  [x]NO      []YES  Preferred Language for Healthcare:   [x]English       []other:    Functional Scale: LEFS: 39/80 52% impaired  Date assessed: 20    Pain level: 3/10; stiffness/soreness      SUBJECTIVE:  : Patient mentioned that he'd like to go to the  today to try the machines there since he will be continuing therapy on his own starting end of next week.  coupon distributed    OBJECTIVE: : snapping ITB at knee with knee flexion; Discussed use of the stick and manual massage to help to decrease tension at the ITB. Patient does have some tenderness and snapping patellar retinaculum laterally as well. Discussed massage for that as well.   : Patient performs sit to stand with L Hip ER/toe out as well as with ambulation likely due to decreased hip AB/quad strength  : pt amb with large angle of toe out on L, quads remain weak limiting ability to NMR with towel rolls behind knee   Supine knee extension prop HEP   Ankle pumps HEP   Supine assisted Knee flexion HEP   SLR flexion HEP;    IB Gastroc  IB soleus stretch  IB HSS 2 X60''  2 X30\"  2 x 30'3  3  32/4 added  2/4 added   Long sit HS stretch HEP   Towel gastroc stretch HEP   Quad stretch Prone    Standing TKE 2/4 - re-intro'd, added ankle DF   Standing HS stretch  Standing hip flexor stretch   Standing hip flexion with SLS  Lateral step ups   Forward step down    Forward step ups     VCs for weight shift onto L LE, B UE support   Quad weakness observed, required B UE support     B UE support  \"   Standing L Knee AAROM stretch on 8\" step  Hamstring stretch on 8 inch step  Standing Runner's stretch    B Heel raises/ B Toe raises 2 10 each  zB UE support   Sit to stand chair 2 X5 reps without UE support without Hip ER 2/20  Recumbent Bike L1 l    squats B UE support    gastroc step off edge of stairs    LAQ    SciFit     Cybex Leg Press Intermittnet UE support    Mat Sidelying Hip Ab 3#   Mat Prone HS curl    Mat Prone hip ext 3#   Mat SAQ    Cable column   Standing TKE   3-way SLR     UE support for balance during L stance    Biodex Cable  Standing TKE  3 way SLR   l    Standing TKE with ball on wall   lacking full extension ROM   Supine hamstring stretch      Supine heel slides    Leg Press 2/4 - progressed weight/sets, B2 L5   Standing SLR Abd 2/4 - added   HEALTHPLEX   Ascend/descend stairs  TM    Squat machine  Leg press  single leg press  Ham curl  Leg ext  ecc leg ext L  TRX squats  TRX single leg attempted  Squat with alt leg lift  Lateral band walk  Sit to stand with 3 taps on medium plyobox  2 flights reciprocally  x1 up and x1 down  1 UE support, poor ecc control on L  B UE support to no UE support, VCs for tight glutes and heel/toe pattern                    VCs for maintaining squat   Bike    5 min     TG squats   SL squats  Hip abduction        Lateral dip 4\"  x15 total  2 X10 NMRVarying UE support from B to 2 finger tips    Steps ups with marches     NMR, Difficulty with L stance, requiring intermittent UE support    Standing Marches opp  NMR, Added 2/20 no UE   Standing Hip AB   NMR, Added 2/20   Therapeutic Activities (97774)      11/29/19 Pt was educated on PT POC, Diagnosis, Prognosis, pathomechanics as well as frequency and duration of scheduling future physical therapy appointments. Time was also taken on this day to answer all patient questions and participation in PT.         6 in stairs - forward step up to SLS 90/90 position, retro  step down (RLE never touches step)    2/4 - modified; cued to increase L quad activation and L hipextension   Gait without SPC  Gait without SPC with mirror feedback   100 feet Heavy VCs for glute activation to avoid trendelenburg   VC's for upright posture, glute activation, heel toe pattern and quicker pace  [facility]:   Leg press B  Leg press U  Ham curl B  Hip add   100#  60#  40#  40#    20  20  20  20           Neuromuscular Re-ed (93698)       TKE    Tandem stance  floor 30\" 2 L/R    SLS Added 1/22   TRX Squats Added on 1/13   6\" FWD step -up L LE with R foot tap on 4\" steo on side Added on 1/15 Holding stick   6\" FWD step-ups  L/R   2\", 4\" FWD step=down Added on 1/15 Holding stick   Biodex Balance Added on 1/15   Ramp 1 rail; decreased control L knee flexion descending   Bungee walking Lateral    Bungee Walking Forward    Bungee walking Backward Added 1/30 for NMR; Requires CGA-Min A at times due to quad/hip weakness   Rebounders on    SLS  No airex on 2/20 NMR   Airex    Manual Intervention (64814)        Prone Quad Stretch      Knee mobs/PROM     Tib/Fem Mobs      Patella Mobs    Ankle mobs    Knee extension/flexion PROM  Ankle on towel roll, lacking 5 actively, 4 passively    Muscle manual stretches    Prone        Pt.  Education:   1/28 - Reassessed goals, discussed progress made and areas remaining for improvement including normalized gait without SPC, increased hip and knee strength, increased knee ROM, and decrease in pain with functional mobility   1/10 - pt educated on performing forward and lateral step-ups for HEP with instruction on how to shift weight to facilitate alternating pattern on stairs   -pt educated on diagnosis, prognosis and expectations for rehab  -pt provided with written and illustrated instructions for HEP  -all pt questions were answered    Therapeutic Exercise and NMR EXR  [x] (79812) Provided verbal/tactile cueing for activities related to strengthening, flexibility, endurance, ROM for improvements in LE, proximal hip, and core control with self care, mobility, lifting, ambulation.  [] (28461) Provided verbal/tactile cueing for activities related to improving balance, coordination, kinesthetic sense, posture, motor skill, proprioception  to assist with LE, proximal hip, and core control in self care, mobility, lifting, ambulation and eccentric single leg control.      NMR and Therapeutic Activities:    [x] (40107 or 10408) Provided verbal/tactile cueing for activities related to improving balance, coordination, kinesthetic sense, posture, motor skill, proprioception and motor activation to allow for proper function of core, proximal hip and LE with self care and ADLs  [] (68726) Gait Re-education- Provided training and instruction to the patient for proper LE, core and proximal hip recruitment and positioning and eccentric body weight control with ambulation re-education including up and down stairs     Home Exercise Program:    [] (79974) Reviewed/Progressed HEP activities related to strengthening, flexibility, endurance, ROM of core, proximal hip and LE for functional self-care, mobility, lifting and ambulation/stair navigation   [] (66513)Reviewed/Progressed HEP activities related to improving balance, coordination, kinesthetic sense, posture, motor skill, proprioception of core, proximal hip and LE for self care, mobility,

## 2020-02-25 ENCOUNTER — HOSPITAL ENCOUNTER (OUTPATIENT)
Dept: PHYSICAL THERAPY | Age: 55
Setting detail: THERAPIES SERIES
Discharge: HOME | End: 2020-02-25
Payer: COMMERCIAL

## 2020-02-25 PROCEDURE — 97110 THERAPEUTIC EXERCISES: CPT

## 2020-02-25 NOTE — FLOWSHEET NOTE
[facility] [facility]       Physical Therapy Treatment Note     Date:  2020    Patient Name:  Karmen Fry    :  1965  MRN: [id number]  Restrictions/Precautions:    Medical/Treatment Diagnosis Information:  · Diagnosis: L knee OA M17.12  · Treatment Diagnosis: L LE decreased Knee ROM and l LE weakness Limiting Functional Gait/Stairs  Insurance/Certification information:  PT Insurance Information: BCBS; pre-authorization needed  Physician Information:  Referring Practitioner: Sumeet Marrero  Plan of care signed (Y/N): [x]  Yes []  No     Date of Patient follow up with Physician: amy BECK 3/23/2020     Progress Report: []  Yes  [x]  No     Date Range for reporting period:  Beginnin20  Ending: re-eval on 20    Progress report due (10 Rx/or 30 days whichever is less):     Recertification due (POC duration/ or 90 days whichever is less):     Visit # Insurance Allowable Auth required? Date Range    Preauthorize? []  Yes  [x]  No To be determined     Latex Allergy:  [x]NO      []YES  Preferred Language for Healthcare:   [x]English       []other:    Functional Scale: LEFS: 39/80 52% impaired  Date assessed: 20    Pain level: 2-3/10; stiffness/soreness      SUBJECTIVE:       Pt states he is actually getting up and doing HEP now. Feels better a little each day. Wants to go to [facility] for last few visits to solidify HEP. OBJECTIVE:   : pt ambulating without AD and with only mild deviations   : snapping ITB at knee with knee flexion; Discussed use of the stick and manual massage to help to decrease tension at the ITB. Patient does have some tenderness and snapping patellar retinaculum laterally as well. Discussed massage for that as well.   : Patient performs sit to stand with L Hip ER/toe out as well as with ambulation likely due to decreased hip AB/quad strength  : pt amb with large angle of toe out on L, well as at home. The discharge plan from the hospital was reviewed with the patient; specifically, to reduce length of hospital stay and to minimize time before reinitiating outpatient physical therapy after surgery. Education regarding early mobility post-operatively in the hospital and emphasis on working with both physical therapy and nursing staff to achieve ambulation goal of 150 feet was provided. The patient was highly encouraged to attend joint class in hospital prior to surgery for further instructions on pre and post-surgical care. Also, education regarding goals and expectations was provided; specifically, knee flexion range of motion greater than or equal to 90 degrees and 0 degrees knee extension to promote improved gait mechanics and ambulation. The patient was encouraged to utilize ice/cold pack after surgery to address pain, minimize swelling as often as possible.  It is in my medical opinion that this patient is clear from all physical barriers prior to consideration for surgery, activity modifications prior to and post operatively have been discussed with this patient as well as discharge planning and is cleared for surgery from physical therapy [id number]' Functional testing Pre hab        Date 11/29/19 Re eval post op   Date :1/2/20  ~ 4 weeks post-op ~8 week  week f/u   Date   1/28/20 12 week f/u  Date : D/C           TUG 5.98\" 14.85\" 8.5\"     30 second sit to stand 11 6 9     6 minute walk 466.04 m 298.98 m 377.34 m     Balance        Narrow BLAINE 10 10 10     Semi tandem 10 10 10     Tandem  L 6.71\"  R 3.88\" 1.7\" L 10\" L       SLS 10\" 0\" 6\"     Knee AROM L/R 0-140 L 10-90 L 5-108     Knee Extension MMT R/L L 5   R 4 L 4-/5     L 4+/5     Hip aBduction MMT R/L L 3+  R 4 L 4-/5  L 4/5     LEFS 41; 40-59% 20; 60-79% 39; 52%        RESTRICTIONS/PRECAUTIONS: R TKR 3/2019    Exercises/Interventions:     Therapeutic Exercise (44958) -  Resistance / level Sets/sec Reps Notes / Cues   Supine gluteal isos    HEP   Supine quad isos HEP NMR with towel rolls behind knee   Supine knee extension prop HEP   Ankle pumps HEP   Supine assisted Knee flexion HEP   SLR flexion HEP;    IB Gastroc  IB soleus stretch  IB HSS 2/4 added  2/4 added   Long sit HS stretch HEP   Towel gastroc stretch HEP   Quad stretch Prone    Standing TKE 2/4 - re-intro'd, added ankle DF   Standing HS stretch  Standing hip flexor stretch   Standing hip flexion with SLS  Lateral step ups   Forward step down    Forward step ups     VCs for weight shift onto L LE, B UE support   Quad weakness observed, required B UE support     B UE support  \"   Standing L Knee AAROM stretch on 8\" step  Hamstring stretch on 8 inch step  Standing Runner's stretch    B Heel raises/ B Toe raises zB UE support   Sit to stand chair   Recumbent Bike L1 l    squats B UE support    gastroc step off edge of stairs    LAQ    SciFit     Cybex Leg Press Intermittnet UE support    Mat Sidelying Hip Ab 3#   Mat Prone HS curl    Mat Prone hip ext 3#   Mat SAQ    Cable column   Standing TKE   3-way SLR     UE support for balance during L stance    Biodex Cable  Standing TKE  3 way SLR   l    Standing TKE with ball on wall   lacking full extension ROM   Supine hamstring stretch      Supine heel slides    Leg Press 2/4 - progressed weight/sets, B2 L5   Standing SLR Abd 2/4 - added   HEALTH[employer]X   Ascend/descend stairs  TM  Elliptical     Squat machine  Leg press  single leg press  Ham curl  Leg ext  ecc leg ext L  TRX squats  TRX single leg attempted  Squat with alt leg lift  Lateral band walk  Sit to stand with 3 taps on medium plyobox    Hip abd   Hip add  Single leg squats with B UE support  Squat taps on smallest plyo box   2 flights reciprocally  Level 1 30#  30#, 20#, 15#  30#x1 up and x1 downX 5 mins3 x 101 x 10 each weight  3 x 10  x10 B  x10      Improved ecc phase of stairs descending                 VCs for maintaining squat                Unable to actually squat to box but hovering about 1 inch above   Bike        TG squats   SL squats  Hip abduction        Lateral dip 4\"  x15 total  2 X10 NMRVarying UE support from B to 2 finger tips    Steps ups with marches     NMR, Difficulty with L stance, requiring intermittent UE support    Standing Marches opp  NMR, Added 2/20 no UE   Standing Hip AB   NMR, Added 2/20                  Therapeutic Activities (07783)      11/29/19 Pt was educated on PT POC, Diagnosis, Prognosis, pathomechanics as well as frequency and duration of scheduling future physical therapy appointments. Time was also taken on this day to answer all patient questions and participation in PT.         6 in stairs - forward step up to SLS 90/90 position, retro  step down (RLE never touches step)    2/4 - modified; cued to increase L quad activation and L hipextension   Gait without SPC  Gait without SPC with mirror feedback   100 feet Heavy VCs for glute activation to avoid trendelenburg   VC's for upright posture, glute activation, heel toe pattern and quicker pace  [facility]:   Leg press B  Leg press U  Ham curl B  Hip add   100#  60#  40#  40#               Neuromuscular Re-ed (74329)       TKE    Tandem stance  floor 30\" 2 L/R    SLS Added 1/22   TRX Squats Added on 1/13   6\" FWD step -up L LE with R foot tap on 4\" steo on side Added on 1/15 Holding stick   6\" FWD step-ups  L/R   2\", 4\" FWD step=down Added on 1/15 Holding stick   Biodex Balance Added on 1/15   Ramp 1 rail; decreased control L knee flexion descending   Bungee walking Lateral    Bungee Walking Forward    Bungee walking Backward Added 1/30 for NMR; Requires CGA-Min A at times due to quad/hip weakness   Rebounders on    SLS  No airex on 2/20 NMR   Airex    Manual Intervention (74695)        Prone Quad Stretch      Knee mobs/PROM     Tib/Fem Mobs      Patella Mobs    Ankle mobs    Knee extension/flexion PROM  Ankle on towel roll, lacking 5 actively, 4 passively    Muscle manual stretches    Prone        Pt. Education:   1/28 - Reassessed goals, discussed progress made and areas remaining for improvement including normalized gait without SPC, increased hip and knee strength, increased knee ROM, and decrease in pain with functional mobility   1/10 - pt educated on performing forward and lateral step-ups for HEP with instruction on how to shift weight to facilitate alternating pattern on stairs   -pt educated on diagnosis, prognosis and expectations for rehab  -pt provided with written and illustrated instructions for HEP  -all pt questions were answered    Therapeutic Exercise and NMR EXR  [x] (11517) Provided verbal/tactile cueing for activities related to strengthening, flexibility, endurance, ROM for improvements in LE, proximal hip, and core control with self care, mobility, lifting, ambulation.  [] (85907) Provided verbal/tactile cueing for activities related to improving balance, coordination, kinesthetic sense, posture, motor skill, proprioception  to assist with LE, proximal hip, and core control in self care, mobility, lifting, ambulation and eccentric single leg control.      NMR and Therapeutic Activities:    [] (10851 or 53491) Provided verbal/tactile cueing for activities related to improving balance, coordination, kinesthetic sense, posture, motor skill, proprioception and motor activation to allow for proper function of core, proximal hip and LE with self care and ADLs  [] (52410) Gait Re-education- Provided training and instruction to the patient for proper LE, core and proximal hip recruitment and positioning and eccentric body weight control with ambulation re-education including up and down stairs     Home Exercise Program:    [] (28586) Reviewed/Progressed HEP activities related to strengthening, flexibility, endurance, ROM of core, proximal hip and LE for functional self-care, mobility, lifting and ambulation/stair navigation   [] (09027)Reviewed/Progressed HEP activities related to improving balance, coordination, kinesthetic sense, posture, motor skill, proprioception of core, proximal hip and LE for self care, mobility, lifting, and ambulation/stair navigation      Manual Treatments:  PROM / STM / Oscillations-Mobs:  G-I, II, III, IV (PA's, Inf., Post.)  [] (72836) Provided manual therapy to mobilize LE, proximal hip and/or LS spine soft tissue/joints for the purpose of modulating pain, promoting relaxation,  increasing ROM, reducing/eliminating soft tissue swelling/inflammation/restriction, improving soft tissue extensibility and allowing for proper ROM for normal function with self care, mobility, lifting and ambulation. Modalities:  [] (18476) Vasopneumatic compression: Utilized vasopneumatic compression to decrease edema / swelling for the purpose of improving mobility and quad tone / recruitment which will allow for increased overall function including but not limited to self-care, transfers, ambulation, and ascending / descending stairs. Modalities:    2/25, 2/20, 2/13, 2/8, 1/30, 1/28, 1/24, 1/22, 1/7, 1/15, 1/13, 1/10: 1/9: CP to L knee in supine x 10 mins     Charges:  Timed Code Treatment Minutes: 34   Total Treatment Minutes: 44     [] EVAL - LOW (84780)   [] EVAL - MOD (79011)  [] EVAL - HIGH (74435)  [] RE-EVAL (06393)  [x] LT(66817) x   2   [] Ionto  [] NMR (55019) X   [] Vaso  [] Manual (63712)           [] Ultrasound  [] TA x       [] Mech Traction (43638)  [] Aquatic Therapy x     [] ES (un) (73265):   [] Home Management Training x [] ES(attended) (60981)   [] Group:     [] Other:       GOALS:  Patient stated goal: build quad muscle  [x] Progressing: [] Met: [] Not Met: [] Adjusted    Therapist goals for Patient:   Short Term Goals: To be achieved in: 1 visit  1. Independent in HEP and progression per patient tolerance, in order to prevent re-injury. [x] Progressing: [] Met: [] Not Met: [] Adjusted  2.  Patient will have a decrease in pain to facilitate improvement in movement,

## 2020-02-27 ENCOUNTER — TELEPHONE (OUTPATIENT)
Dept: ORTHOPEDIC SURGERY | Age: 55
End: 2020-02-27

## 2020-02-27 ENCOUNTER — HOSPITAL ENCOUNTER (OUTPATIENT)
Dept: PHYSICAL THERAPY | Age: 55
Setting detail: THERAPIES SERIES
Discharge: HOME | End: 2020-02-27
Payer: COMMERCIAL

## 2020-02-27 PROCEDURE — 97112 NEUROMUSCULAR REEDUCATION: CPT

## 2020-02-27 PROCEDURE — 97110 THERAPEUTIC EXERCISES: CPT

## 2020-02-27 RX ORDER — HYDROCODONE BITARTRATE AND ACETAMINOPHEN 5; 325 MG/1; MG/1
1 TABLET ORAL DAILY
Qty: 7 TABLET | Refills: 0 | Status: CANCELLED | OUTPATIENT
Start: 2020-02-27 | End: 2020-03-05

## 2020-02-27 NOTE — FLOWSHEET NOTE
[address] [facility]   Physical Therapy /DISCHARGE    Dear   Sandoval Tompkins,    We had the pleasure of treating the following patient for physical therapy services at [address] [place] [address]. A summary of our findings can be found in the updated assessment below. This includes our plan of care. If you have any questions or concerns regarding these findings, please do not hesitate to contact me at the office phone number checked above. Thank you for the referral.     Physician Signature:________________________________Date:__________________  By signing above (or electronic signature), therapists plan is approved by physician    ASSESSMENT: Pt demonstrates improved L knee and hip strength, knee ROM, and endurance as noted by formal strength and ROM testing and functional tests including TUG, balance testing, 6 minute walk test, and 30s sit to stand. LEFS has also improved to 20-39% disability with the majority of difficulty being with descending stairs and ambulating down ramps. Pt displays limited eccentric quad control with descending stairs and eccentric exercises, but is unable to afford further therapy to address these deficits. Therefore, pt will be discharged this session and was provided with HEP and pass to [facility] in order to address these deficits and continue to strengthen his LLE for full return to PLOF.      Functional Outcome: LEFS: 20-39% disability    Overall Response to Treatment:   [x]Patient is responding well to treatment and improvement is noted with regards to goals   []Patient should continue to improve in reasonable time if they continue HEP   []Patient has plateaued and is no longer responding to skilled PT intervention    []Patient is getting worse and would benefit from return to referring MD   []Patient unable to adhere to initial POC   []Other:     Date range of Visits: 11/29/19 - 2/27/20  Total Visits: prehabilitation will facilitate improved post-operative outcomes. The patient was educated on and instructed in HEP as listed. The patient was given a detailed handout for exercises to initiate in the hospital post-operatively as well as at home. The discharge plan from the hospital was reviewed with the patient; specifically, to reduce length of hospital stay and to minimize time before reinitiating outpatient physical therapy after surgery. Education regarding early mobility post-operatively in the hospital and emphasis on working with both physical therapy and nursing staff to achieve ambulation goal of 150 feet was provided. The patient was highly encouraged to attend joint class in hospital prior to surgery for further instructions on pre and post-surgical care. Also, education regarding goals and expectations was provided; specifically, knee flexion range of motion greater than or equal to 90 degrees and 0 degrees knee extension to promote improved gait mechanics and ambulation. The patient was encouraged to utilize ice/cold pack after surgery to address pain, minimize swelling as often as possible.  It is in my medical opinion that this patient is clear from all physical barriers prior to consideration for surgery, activity modifications prior to and post operatively have been discussed with this patient as well as discharge planning and is cleared for surgery from physical therapy [id number]' Functional testing Pre hab        Date 11/29/19 Re eval post op   Date :1/2/20  ~ 4 weeks post-op ~8 week  week f/u   Date   1/28/20 D/C D/C           TUG 5.98\" 14.85\" 8.5\" 8.67\"    30 second sit to stand 11 6 9 11    6 minute walk 466.04 m 298.98 m 377.34 m 472.44    Balance        Narrow BLAINE 10 10 10 10    Semi tandem 10 10 10 10    Tandem  L 6.71\"  R 3.88\" 1.7\" L 10\" L   10\" L    SLS 10\" 0\" 6\" 6.5\"    Knee AROM L/R 0-140 L 10-90 L 5-108 L 5 -110    Knee Extension MMT R/L L 5   R 4 L 4-/5     L 4+/5 L 4+/5    Hip (72561) Reviewed/Progressed HEP activities related to strengthening, flexibility, endurance, ROM of core, proximal hip and LE for functional self-care, mobility, lifting and ambulation/stair navigation   [] (79073)Reviewed/Progressed HEP activities related to improving balance, coordination, kinesthetic sense, posture, motor skill, proprioception of core, proximal hip and LE for self care, mobility, lifting, and ambulation/stair navigation      Manual Treatments:  PROM / STM / Oscillations-Mobs:  G-I, II, III, IV (PA's, Inf., Post.)  [] (69221) Provided manual therapy to mobilize LE, proximal hip and/or LS spine soft tissue/joints for the purpose of modulating pain, promoting relaxation,  increasing ROM, reducing/eliminating soft tissue swelling/inflammation/restriction, improving soft tissue extensibility and allowing for proper ROM for normal function with self care, mobility, lifting and ambulation. Modalities:  [] (54017) Vasopneumatic compression: Utilized vasopneumatic compression to decrease edema / swelling for the purpose of improving mobility and quad tone / recruitment which will allow for increased overall function including but not limited to self-care, transfers, ambulation, and ascending / descending stairs.      Modalities:    2/25, 2/20, 2/13, 2/8, 1/30, 1/28, 1/24, 1/22, 1/7, 1/15, 1/13, 1/10: 1/9: CP to L knee in supine x 10 mins     Charges:  Timed Code Treatment Minutes: 50   Total Treatment Minutes: 60     [] EVAL - LOW (27095)   [] EVAL - MOD (82823)  [] EVAL - HIGH (64415)  [] RE-EVAL (85971)  [x] DJ(20307) x   2   [] Ionto  [x] NMR (30246) X 1   [] Vaso  [] Manual (34742)           [] Ultrasound  [] TA x       [] Mech Traction (54171)  [] Aquatic Therapy x     [] ES (un) (89305):   [] Home Management Training x [] ES(attended) (83909)   [] Group:     [] Other:       GOALS:  Patient stated goal: build quad muscle  [x] Progressing: [] Met: [] Not Met: [] Adjusted    Therapist goals for Patient:   Short Term Goals: To be achieved in: 1 visit  1. Independent in HEP and progression per patient tolerance, in order to prevent re-injury. [] Progressing: [x] Met: [] Not Met: [] Adjusted  2. Patient will have a decrease in pain to facilitate improvement in movement, function, and ADLs as indicated by Functional Deficits. [] Progressing: [x] Met: [] Not Met: [] Adjusted  3. All patient questions regarding expectations for rehab following upcoming surgery are answered. [] Progressing: [x] Met: [] Not Met: [] Adjusted    Long Term Goals:   Long Term Goals: To be achieved in: 6-8 weeks  1. Disability index score of 18% or less for the LEFS to assist with reaching prior level of function. [] Progressing: [] Met: [x] Not Met: 20-39% disability [] Adjusted  2. Patient will demonstrate increased AROM to L knee 0-110  to allow for proper joint functioning as indicated by patientsability to perform L=R sit to stand with equal weight-bearing,  [] Progressing: [x] Partially Met:  5-110 deg L knee [] Not Met: [] Adjusted  3. Patient will demonstrate an increase in Strength to at least 4+/5  as well as good proximal hip strength and control to allow for proper functional mobility as indicated by patients ability to perform reciprocal stairs with 1 rail   [] Progressing: [x] Partially Met: 4/5 hip abduction [] Not Met: [] Adjusted  4. Patient will return to functional activities including amb. Up/down ramp without increased symptoms or restriction. [] Progressing: [x] Partially Met: decreasing with time [] Not Met: [] Adjusted  5. Patient to return to work with [facility] [employer] [facility] and able to work a full schedule with pain 2/10 or less by discharge. [] Progressing: [x] Met: [] Not Met: [] Adjusted      Overall Progression Towards Functional goals/ Treatment Progress Update:  [x] Patient has progressed as expected towards functional goals listed.  Has met all goals but LEFS score and partially met strength and knee extension goal.   [] Progression is slowed due to complexities/Impairments listed. [] Progression has been slowed due to co-morbidities. [] Plan just implemented, too soon to assess goals progression <30days   [] Goals require adjustment due to lack of progress  [] Patient is not progressing as expected and requires additional follow up with physician  [] Other    Persisting Functional Limitations/Impairments:  []Sitting []Standing   [x]Walking [x]Stairs   []Transfers []ADLs   [x]Squatting/bending [x]Kneeling  []Housework [x]Job related tasks  []Driving [x]Sports/Recreation   []Sleeping []Other:    ASSESSMENT: Pt demonstrates improved L knee and hip strength, knee ROM, and endurance as noted by formal strength and ROM testing and functional tests including TUG, balance testing, 6 minute walk test, and 30s sit to stand. LEFS has also improved to 20-39% disability with the majority of difficulty being with descending stairs and ambulating down ramps. Pt displays limited eccentric quad control with descending stairs and eccentric exercises, but is unable to afford further therapy to address these deficits. Therefore, pt will be discharged this session and was provided with HEP and pass to [facility] in order to address these deficits and continue to strengthen his LLE for full return to PLOF. Treatment/Activity Tolerance:  [x] Pt able to complete treatment [] Patient limited by fatique  [] Patient limited by pain  [] Patient limited by other medical complications  [] Other:     Prognosis:  [x] Good [] Fair  [] Poor    Patient Requires Follow-up: [x] Yes  [] No    Return to Play:    [x]  N/A   []  Stage 1: Intro to Strength   []  Stage 2: Return to Run and Strength   []  Stage 3: Return to Jump and Strength   []  Stage 4: Dynamic Strength and Agility   []  Stage 5: Sport Specific Training     []  Ready to Return to Play, Meets All Above Stages   []  Not Ready for Return to Sports   Comments:            PLAN: D/C pt with HEP.

## 2020-02-28 RX ORDER — HYDROCODONE BITARTRATE AND ACETAMINOPHEN 5; 325 MG/1; MG/1
1 TABLET ORAL DAILY
Qty: 7 TABLET | Refills: 0 | Status: SHIPPED | OUTPATIENT
Start: 2020-02-28 | End: 2020-03-06

## 2020-05-04 RX ORDER — CEPHALEXIN 500 MG/1
CAPSULE ORAL
Qty: 8 CAPSULE | Refills: 1 | Status: SHIPPED | OUTPATIENT
Start: 2020-05-04 | End: 2020-09-02 | Stop reason: ALTCHOICE
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
## 2020-08-04 ENCOUNTER — TELEPHONE (OUTPATIENT)
Dept: ORTHOPEDIC SURGERY | Age: 55
End: 2020-08-04

## 2020-08-04 NOTE — TELEPHONE ENCOUNTER
He said his Diclofinac needs to be ordered for 4 tubes every month for 6 months.   He can be reached at [phone]

## 2020-08-06 ENCOUNTER — TELEPHONE (OUTPATIENT)
Dept: FAMILY MEDICINE CLINIC | Age: 55
End: 2020-08-06

## 2020-08-06 NOTE — TELEPHONE ENCOUNTER
----- Message from Bipin Hunter sent at 8/5/2020  3:39 PM EDT -----  Subject: Message to Provider    QUESTIONS  Information for Provider? Patient requesting yearly labs   would like phone call once orders are in the system. ---------------------------------------------------------------------------  --------------  Peola Backer INFO  What is the best way for the office to contact you? OK to leave message on   voicemail  Preferred Call Back Phone Number? [phone]  ---------------------------------------------------------------------------  --------------  SCRIPT ANSWERS  Relationship to Patient?  Self

## 2020-08-07 NOTE — TELEPHONE ENCOUNTER
Patient notified to please get lab work done prior to appointment 9/2/20 with Franc Lopes. Orders placed in Epic patient states he will go to [facility] for lab.

## 2020-08-29 ENCOUNTER — HOSPITAL ENCOUNTER (OUTPATIENT)
Age: 55
Discharge: HOME | End: 2020-08-29
Payer: COMMERCIAL

## 2020-08-29 LAB
CHOLESTEROL, TOTAL: 168 MG/DL (ref 0–199)
HDLC SERPL-MCNC: 42 MG/DL (ref 40–60)
LDL CHOLESTEROL CALCULATED: 108 MG/DL
PROSTATE SPECIFIC ANTIGEN: 0.59 NG/ML (ref 0–4)
TRIGL SERPL-MCNC: 91 MG/DL (ref 0–150)
VLDLC SERPL CALC-MCNC: 18 MG/DL

## 2020-08-29 PROCEDURE — 80061 LIPID PANEL: CPT

## 2020-08-29 PROCEDURE — 36415 COLL VENOUS BLD VENIPUNCTURE: CPT

## 2020-08-29 PROCEDURE — 84153 ASSAY OF PSA TOTAL: CPT

## 2020-09-02 ENCOUNTER — OFFICE VISIT (OUTPATIENT)
Dept: FAMILY MEDICINE CLINIC | Age: 55
End: 2020-09-02
Payer: COMMERCIAL

## 2020-09-02 VITALS
DIASTOLIC BLOOD PRESSURE: 86 MMHG | WEIGHT: 235 LBS | HEART RATE: 76 BPM | TEMPERATURE: 97.3 F | SYSTOLIC BLOOD PRESSURE: 133 MMHG | BODY MASS INDEX: 32.78 KG/M2

## 2020-09-02 PROBLEM — K21.9 GASTROESOPHAGEAL REFLUX DISEASE WITHOUT ESOPHAGITIS: Status: ACTIVE | Noted: 2020-09-02

## 2020-09-02 PROCEDURE — 99396 PREV VISIT EST AGE 40-64: CPT | Performed by: PHYSICIAN ASSISTANT

## 2020-09-02 PROCEDURE — 90686 IIV4 VACC NO PRSV 0.5 ML IM: CPT | Performed by: PHYSICIAN ASSISTANT

## 2020-09-02 PROCEDURE — 90471 IMMUNIZATION ADMIN: CPT | Performed by: PHYSICIAN ASSISTANT

## 2020-09-02 RX ORDER — MENTHOL 40 MG/ML
GEL TOPICAL
COMMUNITY

## 2020-09-02 RX ORDER — TRAMADOL HYDROCHLORIDE 50 MG/1
50 TABLET ORAL 2 TIMES DAILY PRN
Qty: 60 TABLET | Refills: 2 | Status: SHIPPED | OUTPATIENT
Start: 2020-09-02 | End: 2020-12-23 | Stop reason: SDUPTHER

## 2020-09-02 ASSESSMENT — ENCOUNTER SYMPTOMS
TROUBLE SWALLOWING: 0
CHEST TIGHTNESS: 0
EYE PAIN: 0
CONSTIPATION: 0
SHORTNESS OF BREATH: 0
COUGH: 0
SORE THROAT: 0
DIARRHEA: 0
ABDOMINAL PAIN: 0
BACK PAIN: 0
VOICE CHANGE: 0

## 2020-09-02 NOTE — PATIENT INSTRUCTIONS
Eleuterio Pretty was seen today for annual exam.    Diagnoses and all orders for this visit:    Preventative health care    Arthritis of right knee  -     traMADol (ULTRAM) 50 MG tablet; Take 1 tablet by mouth 2 times daily as needed for Pain for up to 30 days. Vitamin D deficiency  -     Vitamin D 25 Hydroxy    YOUSIF (obstructive sleep apnea)    Need for immunization against influenza  -     INFLUENZA, QUADV, 3 YRS AND OLDER, IM PF, PREFILL SYR OR SDV, 0.5ML (AFLURIA QUADV, PF)    Gastroesophageal reflux disease without esophagitis    Screening for diabetes mellitus  -     Comprehensive Metabolic Panel    Arthritis of left knee  -     traMADol (ULTRAM) 50 MG tablet; Take 1 tablet by mouth 2 times daily as needed for Pain for up to 30 days. Flu shot today, refilled Tramadol, get more labs, will find out when last colonoscopy was.

## 2020-09-02 NOTE — PROGRESS NOTES
Subjective:      Patient ID: Smita Solorzano is a 60 y.o. male. HPI  Patient is here today for his yearly exam. He feels pretty good. He just recently had labs done. He had left knee replacement in December of 2019. Was a little lazy with PT, started again in his pool this summer. Still having left knee pain and limping. It did not heal like the right way like his right knee. He hikes 3 days a week with his dogs. He does use biofreeze and diclofenac gel. He cannot take any NSAID's due to skin reaction. He would like to restart Tramdol as needed. His GERD is stable taking the protonix. No SE's. He would like me to take over prescribing his protonix. He sleeps \"ok\". Has mild sleep apnea, does not use a CPAP. No bowel/bladder issues. He exercises daily. He walks, bikes, hikes. He feels his diet is pretty healthy most of the time. His immunizations are current. Would like a flu shot today. He is current with eye exam, needs a dental cleaning. Will find out when last colonoscopy was. He had low Vit D back in December but never was told what to take and never rechecked. Ordered by Dr. William Sevilla. Review of Systems   Constitutional: Negative for appetite change, fatigue and unexpected weight change. HENT: Negative for congestion, dental problem, ear pain, hearing loss, sore throat, trouble swallowing and voice change. Eyes: Negative for pain and visual disturbance. Respiratory: Negative for cough, chest tightness and shortness of breath. Cardiovascular: Negative for chest pain and palpitations. Gastrointestinal: Negative for abdominal pain, constipation and diarrhea. Genitourinary: Negative for difficulty urinating. Musculoskeletal: Positive for arthralgias (knees). Negative for back pain, myalgias and neck pain. Skin: Negative for rash. Neurological: Negative for dizziness, speech difficulty, weakness, numbness and headaches. Hematological: Negative for adenopathy. Psychiatric/Behavioral: Negative for confusion and sleep disturbance. The patient is not nervous/anxious. Objective:   Physical Exam  Vitals signs reviewed. Constitutional:       Appearance: Normal appearance. He is well-developed and well-groomed. HENT:      Head: Normocephalic. Right Ear: Tympanic membrane and ear canal normal.      Left Ear: Tympanic membrane and ear canal normal.      Nose: Nose normal.      Mouth/Throat:      Pharynx: Oropharynx is clear. Uvula midline. Eyes:      Conjunctiva/sclera: Conjunctivae normal.      Pupils: Pupils are equal, round, and reactive to light. Neck:      Musculoskeletal: Normal range of motion and neck supple. No muscular tenderness. Thyroid: No thyromegaly. Trachea: Trachea normal.   Cardiovascular:      Rate and Rhythm: Normal rate and regular rhythm. Chest Wall: PMI is not displaced. Pulses: Normal pulses. Heart sounds: Normal heart sounds. Pulmonary:      Effort: Pulmonary effort is normal.      Breath sounds: Normal breath sounds. Abdominal:      General: Abdomen is protuberant. Bowel sounds are normal.      Palpations: Abdomen is soft. Tenderness: There is no abdominal tenderness. There is no guarding or rebound. Hernia: No hernia is present. Musculoskeletal: Normal range of motion. Cervical back: Normal.      Thoracic back: Normal.      Lumbar back: Normal.   Lymphadenopathy:      Cervical: No cervical adenopathy. Skin:     General: Skin is warm and dry. Findings: No rash. Neurological:      Mental Status: He is alert and oriented to person, place, and time. Cranial Nerves: No cranial nerve deficit. Sensory: No sensory deficit. Gait: Gait normal.   Psychiatric:         Attention and Perception: Attention normal.         Mood and Affect: Mood normal.         Speech: Speech normal.         Behavior: Behavior normal. Behavior is cooperative.          Assessment:      Courtney snell seen today for annual exam.    Diagnoses and all orders for this visit:    Preventative health care    Arthritis of right knee  -     traMADol (ULTRAM) 50 MG tablet; Take 1 tablet by mouth 2 times daily as needed for Pain for up to 30 days. Vitamin D deficiency  -     Vitamin D 25 Hydroxy    YOUSIF (obstructive sleep apnea)    Need for immunization against influenza  -     INFLUENZA, QUADV, 3 YRS AND OLDER, IM PF, PREFILL SYR OR SDV, 0.5ML (AFLURIA QUADV, PF)    Gastroesophageal reflux disease without esophagitis    Screening for diabetes mellitus  -     Comprehensive Metabolic Panel    Arthritis of left knee  -     traMADol (ULTRAM) 50 MG tablet; Take 1 tablet by mouth 2 times daily as needed for Pain for up to 30 days. Plan:      Get labs done, refilled Tramadol for prn, stable GERD, flu shot today, return in 3 months. Controlled substances monitoring: possible medication side effects, risk of tolerance and/or dependence, and alternative treatments discussed, no signs of potential drug abuse or diversion identified, OARRS report reviewed today- activity consistent with treatment plan and medication contract signed today.           Dori Perkins

## 2020-09-03 ENCOUNTER — TELEPHONE (OUTPATIENT)
Dept: ADMINISTRATIVE | Age: 55
End: 2020-09-03

## 2020-09-03 NOTE — TELEPHONE ENCOUNTER
Submitted PA for traMADol HCl 50MG tablets, Key: [id number]. Medication has been APPROVED through 03/02/2021. Please notify patient. Thank you.

## 2020-10-20 RX ORDER — PANTOPRAZOLE SODIUM 40 MG/1
40 TABLET, DELAYED RELEASE ORAL DAILY
Qty: 90 TABLET | Refills: 3 | Status: SHIPPED | OUTPATIENT
Start: 2020-10-20 | End: 2021-10-04

## 2020-12-23 ENCOUNTER — OFFICE VISIT (OUTPATIENT)
Dept: FAMILY MEDICINE CLINIC | Age: 55
End: 2020-12-23
Payer: COMMERCIAL

## 2020-12-23 VITALS
DIASTOLIC BLOOD PRESSURE: 96 MMHG | HEART RATE: 68 BPM | OXYGEN SATURATION: 99 % | SYSTOLIC BLOOD PRESSURE: 136 MMHG | WEIGHT: 242 LBS | BODY MASS INDEX: 33.75 KG/M2 | TEMPERATURE: 98 F

## 2020-12-23 PROCEDURE — 99213 OFFICE O/P EST LOW 20 MIN: CPT | Performed by: PHYSICIAN ASSISTANT

## 2020-12-23 RX ORDER — TRAMADOL HYDROCHLORIDE 50 MG/1
50 TABLET ORAL 2 TIMES DAILY PRN
Qty: 1 TABLET | Refills: 0 | Status: CANCELLED
Start: 2020-12-23 | End: 2021-01-22

## 2020-12-23 RX ORDER — TRAMADOL HYDROCHLORIDE 50 MG/1
50 TABLET ORAL 2 TIMES DAILY PRN
Qty: 1 TABLET | Refills: 0
Start: 2020-12-23 | End: 2021-01-07

## 2020-12-23 RX ORDER — TRAMADOL HYDROCHLORIDE 50 MG/1
50 TABLET ORAL 2 TIMES DAILY PRN
Qty: 60 TABLET | Refills: 2 | Status: CANCELLED | OUTPATIENT
Start: 2020-12-23 | End: 2021-01-22

## 2020-12-23 ASSESSMENT — ENCOUNTER SYMPTOMS
BACK PAIN: 0
ABDOMINAL PAIN: 0
COUGH: 0
SHORTNESS OF BREATH: 0
VOMITING: 0
CONSTIPATION: 0
NAUSEA: 0

## 2020-12-23 ASSESSMENT — PATIENT HEALTH QUESTIONNAIRE - PHQ9
SUM OF ALL RESPONSES TO PHQ QUESTIONS 1-9: 0
2. FEELING DOWN, DEPRESSED OR HOPELESS: 0
1. LITTLE INTEREST OR PLEASURE IN DOING THINGS: 0
SUM OF ALL RESPONSES TO PHQ9 QUESTIONS 1 & 2: 0
SUM OF ALL RESPONSES TO PHQ QUESTIONS 1-9: 0
SUM OF ALL RESPONSES TO PHQ QUESTIONS 1-9: 0

## 2020-12-23 NOTE — PROGRESS NOTES
Subjective:      Patient ID: Ever Donaldson is a 60 y.o. male. HPI Patient is here today for a med check for his chronic knee pain. Has had bilateral knee replacements. Left knee didn't heal like he hoped for. He says he got a little lazy with PT back in the spring and it is not as good as the right knee. He takes his Tramadol anywhere from not at all to twice daily. He is considering medical marijuana for his pain. He just can't afford to see the doctor right now to get the card. He already had his flu shot. BP up a little today. Review of Systems   Constitutional: Negative for chills, fatigue, fever and unexpected weight change. Respiratory: Negative for cough and shortness of breath. Cardiovascular: Negative for chest pain. Gastrointestinal: Negative for abdominal pain, constipation, nausea and vomiting. Genitourinary: Negative for difficulty urinating, dysuria, frequency and hematuria. Musculoskeletal: Positive for arthralgias (both knees but left worse). Negative for back pain and neck pain. Skin: Negative for rash. Neurological: Negative for dizziness and weakness. Objective:   Physical Exam  Vitals signs reviewed. Constitutional:       Appearance: Normal appearance. He is well-developed and well-groomed. Musculoskeletal:      Comments: Did not examine his knees today   Neurological:      Mental Status: He is alert and oriented to person, place, and time. Psychiatric:         Attention and Perception: Attention normal.         Mood and Affect: Mood normal.         Speech: Speech normal.         Behavior: Behavior normal. Behavior is cooperative. Assessment:     Leonela Solitario was seen today for 3 month follow-up.     Diagnoses and all orders for this visit:    Arthritis of right knee    Arthritis of left knee               Plan: Controlled substances monitoring: possible medication side effects, risk of tolerance and/or dependence, and alternative treatments discussed, no signs of potential drug abuse or diversion identified, OARRS report reviewed today- activity consistent with treatment plan and random urine drug screen sent today. Return here in 3-4 months when Tramadol needed again. Let me know what BP is running in a couple weeks.      Yinka Leyva [facility]

## 2020-12-23 NOTE — PATIENT INSTRUCTIONS
Christia Schirmer was seen today for 3 month follow-up. Diagnoses and all orders for this visit:    Arthritis of right knee    Arthritis of left knee       Monitor BP and let me know in 2-3 weeks with readings.

## 2021-01-05 ENCOUNTER — TELEPHONE (OUTPATIENT)
Dept: FAMILY MEDICINE CLINIC | Age: 56
End: 2021-01-05

## 2021-01-05 ENCOUNTER — PATIENT MESSAGE (OUTPATIENT)
Dept: FAMILY MEDICINE CLINIC | Age: 56
End: 2021-01-05

## 2021-01-05 DIAGNOSIS — M17.12 ARTHRITIS OF LEFT KNEE: ICD-10-CM

## 2021-01-05 DIAGNOSIS — M17.11 ARTHRITIS OF RIGHT KNEE: ICD-10-CM

## 2021-01-05 NOTE — TELEPHONE ENCOUNTER
From: Tam Garcia  To: Dori Whalen  Sent: 1/5/2021 9:36 AM EST  Subject: Prescription Question    Can you please order another Rx refill for the Diclofinac? please include 4 tubes per month.

## 2021-01-05 NOTE — TELEPHONE ENCOUNTER
Eloy Vargas from the pharmacy needs to know how many grams the patient is to apply    diclofenac sodium (VOLTAREN) 1 % GEL [[id number]

## 2021-01-07 RX ORDER — TRAMADOL HYDROCHLORIDE 50 MG/1
TABLET ORAL
Qty: 60 TABLET | Refills: 1 | Status: SHIPPED | OUTPATIENT
Start: 2021-01-07 | End: 2021-04-07 | Stop reason: SDUPTHER

## 2021-04-07 DIAGNOSIS — M17.12 ARTHRITIS OF LEFT KNEE: ICD-10-CM

## 2021-04-07 DIAGNOSIS — M17.11 ARTHRITIS OF RIGHT KNEE: ICD-10-CM

## 2021-04-07 RX ORDER — TRAMADOL HYDROCHLORIDE 50 MG/1
50 TABLET ORAL 2 TIMES DAILY PRN
Qty: 60 TABLET | Refills: 0 | Status: SHIPPED | OUTPATIENT
Start: 2021-04-07 | End: 2021-07-15 | Stop reason: SDUPTHER

## 2021-06-14 ENCOUNTER — TELEPHONE (OUTPATIENT)
Dept: FAMILY MEDICINE CLINIC | Age: 56
End: 2021-06-14

## 2021-06-14 NOTE — TELEPHONE ENCOUNTER
Can't do labs or physical until after 9/2 because he had last physical 9/2/20. So physical will have to be rescheduled.      Labs in September will be PSA, lipid panel and CMP

## 2021-06-14 NOTE — TELEPHONE ENCOUNTER
Pt would like an order to have PSA screening - he would like to have the labs done at [facility].      Please advise

## 2021-06-15 NOTE — TELEPHONE ENCOUNTER
Ok that is fine, I'm sorry I can't keep track of every [employer]. But if that is the case for him, then he can keep his appointment. But he might want to check on the labs though.  Sometimes insurance companies do not allow labs done more than once a year if they are normal.

## 2021-07-14 DIAGNOSIS — Z12.5 SCREENING FOR PROSTATE CANCER: Primary | ICD-10-CM

## 2021-07-15 ENCOUNTER — OFFICE VISIT (OUTPATIENT)
Dept: FAMILY MEDICINE CLINIC | Age: 56
End: 2021-07-15
Payer: COMMERCIAL

## 2021-07-15 VITALS
DIASTOLIC BLOOD PRESSURE: 88 MMHG | TEMPERATURE: 98 F | SYSTOLIC BLOOD PRESSURE: 128 MMHG | HEART RATE: 93 BPM | WEIGHT: 239.8 LBS | HEIGHT: 72 IN | OXYGEN SATURATION: 99 % | BODY MASS INDEX: 32.48 KG/M2

## 2021-07-15 DIAGNOSIS — E55.9 VITAMIN D DEFICIENCY: ICD-10-CM

## 2021-07-15 DIAGNOSIS — Z00.00 PREVENTATIVE HEALTH CARE: Primary | ICD-10-CM

## 2021-07-15 DIAGNOSIS — K21.9 GASTROESOPHAGEAL REFLUX DISEASE WITHOUT ESOPHAGITIS: ICD-10-CM

## 2021-07-15 DIAGNOSIS — M17.12 ARTHRITIS OF LEFT KNEE: ICD-10-CM

## 2021-07-15 PROCEDURE — 99396 PREV VISIT EST AGE 40-64: CPT | Performed by: PHYSICIAN ASSISTANT

## 2021-07-15 RX ORDER — TRAMADOL HYDROCHLORIDE 50 MG/1
50 TABLET ORAL 2 TIMES DAILY PRN
Qty: 60 TABLET | Refills: 1 | Status: SHIPPED | OUTPATIENT
Start: 2021-07-15 | End: 2021-08-14

## 2021-07-15 ASSESSMENT — ENCOUNTER SYMPTOMS
SORE THROAT: 0
COUGH: 0
CHEST TIGHTNESS: 0
SHORTNESS OF BREATH: 0
VOICE CHANGE: 0
CONSTIPATION: 0
DIARRHEA: 0
BACK PAIN: 0
ABDOMINAL PAIN: 0
TROUBLE SWALLOWING: 0
EYE PAIN: 0

## 2021-07-15 ASSESSMENT — PATIENT HEALTH QUESTIONNAIRE - PHQ9
2. FEELING DOWN, DEPRESSED OR HOPELESS: 0
1. LITTLE INTEREST OR PLEASURE IN DOING THINGS: 0
SUM OF ALL RESPONSES TO PHQ9 QUESTIONS 1 & 2: 0
SUM OF ALL RESPONSES TO PHQ QUESTIONS 1-9: 0

## 2021-07-15 NOTE — PROGRESS NOTES
Objective:   Physical Exam  Vitals reviewed. Constitutional:       Appearance: Normal appearance. He is well-developed and well-groomed. HENT:      Head: Normocephalic. Right Ear: Tympanic membrane and ear canal normal.      Left Ear: Tympanic membrane and ear canal normal.      Nose: Nose normal.      Mouth/Throat:      Pharynx: Oropharynx is clear. Uvula midline. Eyes:      Conjunctiva/sclera: Conjunctivae normal.      Pupils: Pupils are equal, round, and reactive to light. Neck:      Thyroid: No thyromegaly. Trachea: Trachea normal.   Cardiovascular:      Rate and Rhythm: Normal rate and regular rhythm. Chest Wall: PMI is not displaced. Pulses: Normal pulses. Heart sounds: Normal heart sounds. Pulmonary:      Effort: Pulmonary effort is normal.      Breath sounds: Normal breath sounds. Abdominal:      General: Abdomen is flat. Bowel sounds are normal.      Palpations: Abdomen is soft. Tenderness: There is no abdominal tenderness. There is no guarding or rebound. Hernia: No hernia is present. Musculoskeletal:      Cervical back: Normal range of motion and neck supple. No muscular tenderness. Thoracic back: Normal.      Lumbar back: Normal.      Comments: Did not examine left knee   Lymphadenopathy:      Cervical: No cervical adenopathy. Skin:     General: Skin is warm and dry. Findings: No rash. Neurological:      Mental Status: He is alert and oriented to person, place, and time. Cranial Nerves: No cranial nerve deficit. Sensory: No sensory deficit. Gait: Gait normal.   Psychiatric:         Attention and Perception: Attention normal.         Mood and Affect: Mood normal.         Speech: Speech normal.         Behavior: Behavior normal. Behavior is cooperative.          Assessment:     Leonela Solitario was seen today for annual exam.    Diagnoses and all orders for this visit:    Preventative health care    Arthritis of left knee  - traMADol (ULTRAM) 50 MG tablet; Take 1 tablet by mouth 2 times daily as needed for Pain for up to 30 days. Gastroesophageal reflux disease without esophagitis    Vitamin D deficiency  -     Vitamin D 25 Hydroxy               Plan:      Controlled substances monitoring: possible medication side effects, risk of tolerance and/or dependence, and alternative treatments discussed, no signs of potential drug abuse or diversion identified, OARRS report reviewed today- activity consistent with treatment plan, medication contract signed today and random urine drug screen sent today. Stable conditions, get Vit D and PSA, reviewed labs that were all good, Return here in 3-6 months or when you need more Tramadol.     Dori Delgado

## 2021-07-15 NOTE — PATIENT INSTRUCTIONS
Nelda Chandra was seen today for annual exam.    Diagnoses and all orders for this visit:    Preventative health care    Arthritis of left knee  -     traMADol (ULTRAM) 50 MG tablet; Take 1 tablet by mouth 2 times daily as needed for Pain for up to 30 days.     Gastroesophageal reflux disease without esophagitis    Vitamin D deficiency  -     Vitamin D 25 Hydroxy

## 2021-07-16 ENCOUNTER — TELEPHONE (OUTPATIENT)
Dept: ADMINISTRATIVE | Age: 56
End: 2021-07-16

## 2021-07-16 NOTE — TELEPHONE ENCOUNTER
Submitted PA for traMADol HCl 50MG tablets. [employer] - Rx #: [id number]. Via CM STATUS: APPROVED 7/16/2021 12:00:00 AM, Coverage Ends on: 1/12/2022. Will scan letter when received. Please notify patient, thank you.

## 2021-07-21 ENCOUNTER — TELEPHONE (OUTPATIENT)
Dept: FAMILY MEDICINE CLINIC | Age: 56
End: 2021-07-21

## 2021-07-21 NOTE — TELEPHONE ENCOUNTER
Notified patient regards to drug screen report. THC showed in his system and asked how often he does it? States occasionally, once a week.

## 2021-11-02 ENCOUNTER — OFFICE VISIT (OUTPATIENT)
Dept: ORTHOPEDIC SURGERY | Age: 56
End: 2021-11-02
Payer: COMMERCIAL

## 2021-11-02 VITALS — HEIGHT: 72 IN | WEIGHT: 239 LBS | BODY MASS INDEX: 32.37 KG/M2

## 2021-11-02 DIAGNOSIS — M16.12 ARTHRITIS OF LEFT HIP: Primary | ICD-10-CM

## 2021-11-02 PROCEDURE — 99213 OFFICE O/P EST LOW 20 MIN: CPT | Performed by: ORTHOPAEDIC SURGERY

## 2021-11-02 NOTE — PROGRESS NOTES
[address] [facility] Left     MENDOZA'S NEUROMA    JOINT REPLACEMENT      right, 3/18    KNEE ARTHROSCOPY Bilateral     SYNOVECTOMY, AND CHONDROPLASTY LEFT KNEE    OTHER SURGICAL HISTORY      wakes up fighting  and severe cough    OTHER SURGICAL HISTORY      acl removed    HI INCISE FINGER TENDON SHEATH Left 10/18/2018    LEFT RING FINGER TRIGGER RELEASE performed by Milana Joyce MD at [facility] Left 12/17/2019    LEFT TOTAL KNEE REPLACEMENT ROBOTIC ASSISTED performed by Kathi Fuentes MD at [address] [place] [address] Left 10/7/14    ulnar nerve decompression at elbow    ULNAR TUNNEL RELEASE Right 76/12/75    UMBILICAL HERNIA REPAIR  6/25/15    UPPER GASTROINTESTINAL ENDOSCOPY      UPPER GASTROINTESTINAL ENDOSCOPY  11/16/2017    Dr Bernarda Alicea       Family History   Problem Relation Age of Onset    Breast Cancer Mother     Cancer Father         prostate    Heart Disease Paternal Aunt        Social History     Socioeconomic History    Marital status:      Spouse name: Not on file    Number of children: Not on file    Years of education: Not on file    Highest education level: Not on file   Occupational History    Not on file   Tobacco Use    Smoking status: Never Smoker    Smokeless tobacco: Never Used   Vaping Use    Vaping Use: Never used   Substance and Sexual Activity    Alcohol use: Yes     Alcohol/week: 6.0 standard drinks     Types: 6 Cans of beer per week     Comment: socially    Drug use: Never    Sexual activity: Yes     Partners: Female   Other Topics Concern    Not on file   Social History Narrative    Not on file     Social Determinants of Health     Financial Resource Strain:     Difficulty of Paying Living Expenses:    Food Insecurity:     Worried About Running Out of Food in the Last Year:     [address] in the Last Year:    Transportation Needs:     Lack of Transportation (Medical):      Lack of Transportation (Non-Medical):    Physical Activity:     Days of Exercise per Week:     Minutes of Exercise per Session:    Stress:     Feeling of Stress :    Social Connections:     Frequency of Communication with Friends and Family:     Frequency of Social Gatherings with Friends and Family:     Attends Zoroastrianism Services:     Active Member of Clubs or Organizations:     Attends Club or Organization Meetings:     Marital Status:    Intimate Partner Violence:     Fear of Current or Ex-Partner:     Emotionally Abused:     Physically Abused:     Sexually Abused:         Vitals:    11/02/21 0813   Weight: 239 lb (108.4 kg)   Height: 5' 11.5\" (1.816 m)       Physical Exam  Constitutional  well-groomed, well-nourished, Body mass index is 32.87 kg/m². Cardiovascular  RRR, negative peripheral edema, posterior tibialis pulse 2+  Respiratory  respirations unlabored, on room air; mask on  Abdomen - soft NDNT  Skin  no rashes, wounds, or lesions seen on exposed skin  Neurological - LLE SILT SP/DP/T/sural/saphenous nerve distributions; EHL/FHL/TA/GS intact  Left hip -    Inspection:  No obvious deformity/swelling/ecchymosis   Range of Motion:    Flexion contracture:  none    Flexion:  95 pain     IR:  -5 pain     ER:  50   Special tests:    positive Stinchfield test  Left knee - prior surgical scar well-healed   Range of motion 3 - 115        Imaging:  Images were personally reviewed by myself and discussed with the patient  AP pelvis and Left hip 2 views performed today in clinic - Moderate to severe left hip arthrosis. Subchondral sclerosis and cystic changes are seen. No acute processes such as fracture or dislocation present. Assessment & Plan:  60 y.o. male who presents with    Diagnosis Orders   1. Arthritis of left hip         No orders of the defined types were placed in this encounter.       Discussed at length conservative treatment of hip symptoms/arthritis, according to [facility] [address] Guidelines:  1)  Recommend ice and anti-inflammatories to reduce pain and swelling. Topical NSAIDs and/or patches are options as well. 2)  Recommend maintaining weight in a healthy range to reduce stresses on the hip joint. 3)  Home exercise program, focusing on strengthening, low impact aerobic exercises, and neuromuscular education. 4)  Intra-articular corticosteroid injections are an option. Will refer to Dr. Mary Chowdary for ultrasound-guided injection.       Skip Hong MD

## 2021-11-17 ENCOUNTER — OFFICE VISIT (OUTPATIENT)
Dept: ORTHOPEDIC SURGERY | Age: 56
End: 2021-11-17
Payer: COMMERCIAL

## 2021-11-17 VITALS — BODY MASS INDEX: 32.51 KG/M2 | WEIGHT: 240 LBS | HEIGHT: 72 IN

## 2021-11-17 DIAGNOSIS — M16.12 ARTHRITIS OF LEFT HIP: Primary | ICD-10-CM

## 2021-11-17 PROCEDURE — 20611 DRAIN/INJ JOINT/BURSA W/US: CPT | Performed by: ORTHOPAEDIC SURGERY

## 2021-11-17 RX ORDER — METHYLPREDNISOLONE ACETATE 40 MG/ML
80 INJECTION, SUSPENSION INTRA-ARTICULAR; INTRALESIONAL; INTRAMUSCULAR; SOFT TISSUE ONCE
Status: COMPLETED | OUTPATIENT
Start: 2021-11-17 | End: 2021-11-17

## 2021-11-17 RX ORDER — LIDOCAINE HYDROCHLORIDE 10 MG/ML
5 INJECTION, SOLUTION INFILTRATION; PERINEURAL ONCE
Status: COMPLETED | OUTPATIENT
Start: 2021-11-17 | End: 2021-11-17

## 2021-11-17 RX ADMIN — LIDOCAINE HYDROCHLORIDE 5 ML: 10 INJECTION, SOLUTION INFILTRATION; PERINEURAL at 17:08

## 2021-11-17 RX ADMIN — METHYLPREDNISOLONE ACETATE 80 MG: 40 INJECTION, SUSPENSION INTRA-ARTICULAR; INTRALESIONAL; INTRAMUSCULAR; SOFT TISSUE at 17:08

## 2021-11-17 NOTE — PROGRESS NOTES
ULTRASOUND GUIDED HIP INJECTION    Viry Gan    November 17, 2021    Chief Complaint   Patient presents with    Follow-up     Left Hip ultrasound injection. Referred by Dr Susan Perez        Ht 6' (1.829 m)   Wt 240 lb (108.9 kg)   BMI 32.55 kg/m²     Elizabeth Chapa is 75-year-old gentleman who is sent by Dr. Susan Perez for an ultrasound directed intra-articular steroid injection of his left hip. He has a 1 year history of left hip pain primarily lateral.  States he was status post left total knee replacement in December 2019. He states he was never able to rehab his left knee fully but realized about 6 months ago that it was actually his left hip which was causing his left knee discomfort. He complains of an intermittent achy pain with no pain at rest with pain up to 8 with walking radiates from her lateral hip to his anterior thigh and to knee region. I have today reviewed with Viry Gan the clinically relevant past medical history, medications, allergies, family history, and Review of Systems from the patients most recent history form, and I have documented any details relevant to today's presenting complaints in my history above. The patient's self recorded documents concerning the above have been scanned  into the chart under the \"Media\" tab. Physical Exam:   Examination of the lefthip shows a positive logroll. No Lesion of the skin is noted over the injection site      X-rays of pelvis and left hip show marked loss of lateral joint space and sclerotic and osteophytic changes of both the femoral head and the acetabulum of the left hip. Impression:  left hip osteoarthris does appear to be the source of his lateral hip pain. Plan:  1. Injection with cortisone was recommended into  left   hip joint using ultrasound visualization. He understands the risks and benefits of the injection and describes no potential allergies.  Time out was performed to verify correct person, correct procedure, and correct site. 2. Ultrasound visualization was first performed utilizing the [facility] Ultrasound unit using an 5/2 MHz Curved Probe. finding the femoral neck head junction. Next the femoral artery and vein were visualized with ultrasound medial to the femoral head. The location of the needle insertion was determined well lateral to the neurovascular structures and the site was anesthetized with 3 mL of 1% Lidocaine. The site was then prepped with ChloraPrep. A sterile cover was placed over the transducer head and the femoral head neck junction once again visualized. A 20-gauge spinal needle was then introduced under ultrasound control to the head neck junction. Once the needle was intracapsular the hip joint was injected with 2 mL  of 1% Lidocaine mixed with 2 ml of 40 mg Depo Medrol. Vineet tolerated the procedure well and  did report 75% relief of left hip pain within 5 minutes of the injection. He was not sure yet whether the left knee felt any better. 3.  He was given a handicap sticker. 4.  He is return to Dr. Stevenson [facility].       Heather Acosta MD  11/17/2021

## 2021-11-17 NOTE — PATIENT INSTRUCTIONS
Impression:  left hip osteoarthris does appear to be the source of his lateral hip pain. Plan:  1. Injection with cortisone was recommended into  left   hip joint using ultrasound visualization. He understands the risks and benefits of the injection and describes no potential allergies. Time out was performed to verify correct person, correct procedure, and correct site. 2. Ultrasound visualization was first performed utilizing the [facility] Ultrasound unit using an 5/2 MHz Curved Probe. finding the femoral neck head junction. Next the femoral artery and vein were visualized with ultrasound medial to the femoral head. The location of the needle insertion was determined well lateral to the neurovascular structures and the site was anesthetized with 3 mL of 1% Lidocaine. The site was then prepped with ChloraPrep. A sterile cover was placed over the transducer head and the femoral head neck junction once again visualized. A 20-gauge spinal needle was then introduced under ultrasound control to the head neck junction. Once the needle was intracapsular the hip joint was injected with 2 mL  of 1% Lidocaine mixed with 2 ml of 40 mg Depo Medrol. Vineet tolerated the procedure well and  did report 75% relief of left hip pain within 5 minutes of the injection. He was not sure yet whether the left knee felt any better. 3.  He was given a handicap sticker. 4.  He is return to Dr. G[facility].       Cuauhtemoc Darnell MD  11/17/2021

## 2021-11-17 NOTE — LETTER
[facility] [address] [place], [place] [address] [address] [employer] [address]  Phone: [phone]  Fax: [phone]    Ryan Tracy MD         November 17, 2021     Patient: Symone Montalvo   YOB: 1965   Date of Visit: 11/17/2021       To Whom It May Concern: It is my medical opinion that Chema Wei requires a disability parking placard for the following reasons:  Left hip osteoarthritis  And Left total knee replacement  Duration of need: 5 years    If you have any questions or concerns, please don't hesitate to call.     Sincerely,          Ryan Tracy MD

## 2022-02-21 ENCOUNTER — OFFICE VISIT (OUTPATIENT)
Dept: ORTHOPEDIC SURGERY | Age: 57
End: 2022-02-21
Payer: COMMERCIAL

## 2022-02-21 VITALS — HEIGHT: 73 IN | WEIGHT: 240 LBS | RESPIRATION RATE: 16 BRPM | BODY MASS INDEX: 31.81 KG/M2

## 2022-02-21 DIAGNOSIS — M17.12 PRIMARY OSTEOARTHRITIS OF LEFT KNEE: ICD-10-CM

## 2022-02-21 DIAGNOSIS — Z96.652 STATUS POST TOTAL LEFT KNEE REPLACEMENT: ICD-10-CM

## 2022-02-21 DIAGNOSIS — M76.32 ILIOTIBIAL BAND SYNDROME OF LEFT SIDE: Primary | ICD-10-CM

## 2022-02-21 LAB
C-REACTIVE PROTEIN: <3 MG/L (ref 0–5.1)
SEDIMENTATION RATE, ERYTHROCYTE: 14 MM/HR (ref 0–20)

## 2022-02-21 PROCEDURE — 99213 OFFICE O/P EST LOW 20 MIN: CPT | Performed by: ORTHOPAEDIC SURGERY

## 2022-02-21 NOTE — PROGRESS NOTES
[facility]  [facility]    Chief Complaint: Left total knee arthroplasty with continued pain    HPI:  Josh Montalvo is a 60 y.o. who is here in follow-up of left total knee arthroplasty performed on December 17, 2018 by Gorodn Murphy. Reports that he has had issues with lateral knee pain since the time of surgery. He also has a history of right total knee arthroplasty with Dr. Mcclain Deanasim in this leg is doing well. He feels that his left knee never regained the mobility that his right he has. He also has known left hip osteoarthritis and had a steroid injection done on November 17, 2021 which did help with hip pain for 3 days. This never did help bilateral knee pain. He takes Aleve, tramadol, Tylenol, and uses diclofenac gel all to help with this pain. He reports issues with walking due to the left knee pain. The pain follows his IT band from mid thigh down to the knee. He denies any swelling or redness about the knee. Patient Active Problem List   Diagnosis    Chondromalacia of left knee    Good's esophagus with esophagitis    Left knee DJD    Right knee DJD    Cubital tunnel syndrome    Carpal tunnel syndrome    Diastasis recti    Metatarsalgia of right foot    Primary osteoarthritis of both knees    Arthritis of right knee    Vitamin D deficiency    Facial droop    Bell's palsy    YOUSIF (obstructive sleep apnea)    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    Arthritis of left knee    History of total knee replacement, left 12/17/2019    Gastroesophageal reflux disease without esophagitis       ROS:  Constitutional: denies fever, chills, weight loss  MSK: denies pain in other joints, muscle aches  Neurological: denies numbness, tingling, weakness    Exam:  Resp. rate 16, height 6' 1\" (1.854 m), weight 240 lb (108.9 kg).     Appearance: sitting in exam room chair, appears to be in no acute distress, awake and alert  Resp: unlabored breathing on room air  Skin: warm, dry and intact with out erythema or significant increased temperature  Neuro: grossly intact both lower extremities. Intact sensation to light touch. Motor exam 4+ to 5/5 in all major motor groups. LLE: Examination demonstrates mild pain with logroll and negative Stinchfield. There is a well-healed anterior midline incision over the knee. Active knee range of motion 0-120 degrees (compared to 0-130 degrees contralaterally). The knee is stable to varus and valgus stress and stable to anterior and posterior drawer sign. Tender along the IT band at the knee and the pes anserine bursa. Sensation is intact light touch. There is brisk capillary refill. There is 5/5 muscle strength in all muscle groups. Imaging:  3 views of the left knee were performed and interpreted today. There is a total knee arthroplasty prosthesis in place. The femoral component is cemented. The patellar and tibial components are press-fit with good bony ingrowth. There are no obvious signs of loosening. Prior left hip radiographs were reviewed and is seen for bone-on-bone osteoarthritis with large periarticular osteophytes. Assessment:  Left hip osteoarthritis  Left total knee arthroplasty with continued pain    Plan:  We discussed the possible diagnoses and treatment options. I have low suspicion for infection but ESR and CRP were ordered to help evaluate for this. I assume there was trouble intraoperatively with the press-fit of the femoral component so it was cemented into place. For this reason, I ordered a bone scan to evaluate if the cemented component is becoming loose as he has mostly femoral symptoms. Finally, his issues are mostly along the IT band at the knee. I referred him to physical therapy to help stretch and strengthen his IT band at the left knee. We also discussed his left hip osteoarthritis and how this may also play a role in his continued left knee pain.   He will follow up here after the blood test and bone scan are complete for further discussion. Total time spent on today's encounter was at least 26 minutes. This time included reviewing prior notes, radiographs, and lab results when available, reviewing history obtained by medical assistant, performing history and physical exam, reviewing tests/radiographs with the patient, counseling the patient, ordering medications or tests, documentation in the electronic health record, and coordination of care. This dictation was done with [employer] dictation and may contain mechanical errors related to translation.

## 2022-02-22 ENCOUNTER — TELEPHONE (OUTPATIENT)
Dept: ORTHOPEDIC SURGERY | Age: 57
End: 2022-02-22

## 2022-02-22 NOTE — TELEPHONE ENCOUNTER
INS BILL ALLOW TO GET BONE SCANN AT [facility] will needs order faxed to [facility]  [phone] [facility]

## 2022-02-23 ENCOUNTER — TELEPHONE (OUTPATIENT)
Dept: ORTHOPEDIC SURGERY | Age: 57
End: 2022-02-23

## 2022-02-23 NOTE — TELEPHONE ENCOUNTER
I called [facility] to verify if they do Bone Scans. They do NOT do Bone Scans. I called the patient and left a message.

## 2022-02-23 NOTE — TELEPHONE ENCOUNTER
General Question     Subject: PATIENT WOULD LIKE TO HAVE AN ORDER FOR A BONE SCAN SENT TO [facility] Jimenez NO.:  [phone]    Patient: Lester Brochure   Contact Number: [phone]

## 2022-02-25 ENCOUNTER — PATIENT MESSAGE (OUTPATIENT)
Dept: ORTHOPEDIC SURGERY | Age: 57
End: 2022-02-25

## 2022-02-25 NOTE — TELEPHONE ENCOUNTER
From: Abdirizak Garcia  To: Dr. Madrigal Spark: 2/25/2022 8:27 AM EST  Subject: bone scan    Hello Dr Bonnie Virgen, I am concerned I do not have enough money to pay for the bone scan. Carlee Records Carlee Records I'm hoping we can continue with the therapy on my left knee without it?   Thank you  Kp Roa

## 2022-03-07 ENCOUNTER — HOSPITAL ENCOUNTER (OUTPATIENT)
Dept: NUCLEAR MEDICINE | Age: 57
Discharge: HOME | End: 2022-03-07
Payer: COMMERCIAL

## 2022-03-07 ENCOUNTER — HOSPITAL ENCOUNTER (OUTPATIENT)
Dept: PHYSICAL THERAPY | Age: 57
Setting detail: THERAPIES SERIES
Discharge: HOME | End: 2022-03-07
Payer: COMMERCIAL

## 2022-03-07 DIAGNOSIS — Z96.652 STATUS POST TOTAL LEFT KNEE REPLACEMENT: ICD-10-CM

## 2022-03-07 PROCEDURE — 97161 PT EVAL LOW COMPLEX 20 MIN: CPT

## 2022-03-07 PROCEDURE — 78315 BONE IMAGING 3 PHASE: CPT

## 2022-03-07 PROCEDURE — A9503 TC99M MEDRONATE: HCPCS | Performed by: ORTHOPAEDIC SURGERY

## 2022-03-07 PROCEDURE — 78803 RP LOCLZJ TUM SPECT 1 AREA: CPT

## 2022-03-07 PROCEDURE — 97110 THERAPEUTIC EXERCISES: CPT

## 2022-03-07 PROCEDURE — 3430000000 HC RX DIAGNOSTIC RADIOPHARMACEUTICAL: Performed by: ORTHOPAEDIC SURGERY

## 2022-03-07 RX ORDER — TC 99M MEDRONATE 20 MG/10ML
25.8 INJECTION, POWDER, LYOPHILIZED, FOR SOLUTION INTRAVENOUS
Status: COMPLETED | OUTPATIENT
Start: 2022-03-07 | End: 2022-03-07

## 2022-03-07 RX ADMIN — TC 99M MEDRONATE 25.8 MILLICURIE: 20 INJECTION, POWDER, LYOPHILIZED, FOR SOLUTION INTRAVENOUS at 09:20

## 2022-03-07 NOTE — FLOWSHEET NOTE
[facility]  Phone: [phone]   Fax: [phone]    Physical Therapy Daily Treatment Note  Date:  3/7/2022    Patient Name:  Era Mancera    :  1965  MRN: [id number]  Medical/Treatment Diagnosis Information:  · Diagnosis: M76.32 (ICD-10-CM) - Iliotibial band syndrome of left side  · Treatment Diagnosis: knee pain, LE weakness, decreased activity tolerance, impaired gait  Insurance/Certification information:  PT Insurance Information: [place]  Physician Information:  Referring Practitioner: Mode Sainz MD  Plan of care signed (Y/N): []  Yes [x]  No     Date of Patient follow up with Physician:      Progress Report: [x]  Yes  []  No     Date Range for reporting period:  Beginning: 3/7/22  Ending: TBD    Progress report due (10 Rx/or 30 days whichever is less): visit #3    Recertification due (POC duration/ or 90 days whichever is less): visit #3     Visit # Insurance Allowable Auth required?  Date Range   eval + PMN []  Yes  [x]  No      Latex Allergy:  [x]NO      []YES  Preferred Language for Healthcare:   [x]English       []other:    Functional Scale:        Date assessed:  LEFS: raw score = 41/80; dysfunction = 49%  3/7/22    Pain level:  2-9/10     SUBJECTIVE:  See eval    OBJECTIVE: See eval    RESTRICTIONS/PRECAUTIONS:     Exercises/Interventions:     Therapeutic Exercises (23359) Resistance / level Sets/sec Reps Notes   Knee flexion on step stretch  5'' 3    ITB wall stretch  10'' 3    Reverse clam   10    Standing HF stretch  10'' 3    SLR Abd   10                                Therapeutic Activities (46236)       Biking/TM walk                                   Neuromuscular Re-ed (21607)                                                 Manual Intervention (57221)       L hip distraction                                            Modalities:     Pt. Education:  -patient educated on diagnosis, prognosis and expectations for rehab  -all patient questions were answered    Home Exercise Program:  Access Code: [email or web address]  URL: [email or web address]  Date: 03/07/2022  Prepared by: Carol Daly     Exercises  ITB Stretch at Wall - 2 x daily - 5-6 x weekly - 1 sets - 4 reps - 10 hold  Standing Knee Flexion Stretch on Step - 2 x daily - 5-6 x weekly - 1 sets - 10 reps - 5 hold  Sidelying Reverse Clamshell - 2 x daily - 5-6 x weekly - 2 sets - 15 reps  Standing Hip Flexor Stretch - 2 x daily - 5-6 x weekly - 1 sets - 5 reps - 10 hold  Sidelying Hip Abduction - 1-2 x daily - 6 x weekly - 3 sets - 10 reps    Therapeutic Exercise and NMR EXR  [x] (62492) Provided verbal/tactile cueing for activities related to strengthening, flexibility, endurance, ROM for improvements in  [x] LE / Lumbar: LE, proximal hip, and core control with self care, mobility, lifting, ambulation. [] UE / Cervical: cervical, postural, scapular, scapulothoracic and UE control with self care, reaching, carrying, lifting, house/yardwork, driving, computer work.  [] (40975) Provided verbal/tactile cueing for activities related to improving balance, coordination, kinesthetic sense, posture, motor skill, proprioception to assist with   [] LE / lumbar: LE, proximal hip, and core control in self care, mobility, lifting, ambulation and eccentric single leg control. [] UE / cervical: cervical, scapular, scapulothoracic and UE control with self care, reaching, carrying, lifting, house/yardwork, driving, computer work.   [] (56106) Therapist is in constant attendance of 2 or more patients providing skilled therapy interventions, but not providing any significant amount of measurable one-on-one time to either patient, for improvements in  [] LE / lumbar: LE, proximal hip, and core control in self care, mobility, lifting, ambulation and eccentric single leg control.    [] UE / cervical: cervical, scapular, scapulothoracic and UE control with self care, reaching, carrying, lifting, house/yardwork, driving, computer work. NMR and Therapeutic Activities:    [] (57142 or 62146) Provided verbal/tactile cueing for activities related to improving balance, coordination, kinesthetic sense, posture, motor skill, proprioception and motor activation to allow for proper function of   [] LE: / Lumbar core, proximal hip and LE with self care and ADLs  [] UE / Cervical: cervical, postural, scapular, scapulothoracic and UE control with self care, carrying, lifting, driving, computer work.   [] (39662) Gait Re-education- Provided training and instruction to the patient for proper LE, core and proximal hip recruitment and positioning and eccentric body weight control with ambulation re-education including up and down stairs     Home Management Training / Self Care:  [] (27260) Provided self-care/home management training related to activities of daily living and compensatory training, and/or use of adaptive equipment for improvement with: ADLs and compensatory training, meal preparation, safety procedures and instruction in use of adaptive equipment, including bathing, grooming, dressing, personal hygiene, basic household cleaning and chores.      Home Exercise Program:    [x] (97940) Reviewed/Progressed HEP activities related to strengthening, flexibility, endurance, ROM of   [x] LE / Lumbar: core, proximal hip and LE for functional self-care, mobility, lifting and ambulation/stair navigation   [] UE / Cervical: cervical, postural, scapular, scapulothoracic and UE control with self care, reaching, carrying, lifting, house/yardwork, driving, computer work  [] (58213)Reviewed/Progressed HEP activities related to improving balance, coordination, kinesthetic sense, posture, motor skill, proprioception of   [] LE: core, proximal hip and LE for self care, mobility, lifting, and ambulation/stair navigation    [] UE / Cervical: cervical, postural,  scapular, scapulothoracic and UE control with self care, reaching, carrying, lifting, house/yardwork, driving, computer work    Manual Treatments:  PROM / STM / Oscillations-Mobs:  G-I, II, III, IV (PA's, Inf., Post.)  [] (24582) Provided manual therapy to mobilize LE, proximal hip and/or LS spine soft tissue/joints for the purpose of modulating pain, promoting relaxation,  increasing ROM, reducing/eliminating soft tissue swelling/inflammation/restriction, improving soft tissue extensibility and allowing for proper ROM for normal function with   [] LE / lumbar: self care, mobility, lifting and ambulation. [] UE / Cervical: self care, reaching, carrying, lifting, house/yardwork, driving, computer work. Modalities:  [] (26853) Vasopneumatic compression: Utilized vasopneumatic compression to decrease edema / swelling for the purpose of improving mobility and quad tone / recruitment which will allow for increased overall function including but not limited to self-care, transfers, ambulation, and ascending / descending stairs. Charges:  Timed Code Treatment Minutes: 10   Total Treatment Minutes: 38     [x] EVAL - LOW (73973)   [] EVAL - MOD (60685)  [] EVAL - HIGH (01530)  [] RE-EVAL (89989)  [x] LP(92909) x 1      [] Ionto  [] NMR (41711) x       [] Vaso  [] Manual (53220) x       [] Ultrasound  [] TA x        [] Mech Traction (80117)  [] Aquatic Therapy x      [] ES (un) (32318):   [] Home Management Training x  [] ES(attended) (17483)   [] Dry Needling 1-2 muscles (14756):  [] Dry Needling 3+ muscles (376985)  [] Group:      [] Other:     GOALS:  Patient stated goal: stretch L knee  []? Progressing: []? Met: []? Not Met: []? Adjusted     Therapist goals for Patient:   Short Term Goals: To be achieved in: 2 weeks  1. Independent in HEP and progression per patient tolerance, in order to prevent re-injury. []? Progressing: []? Met: []? Not Met: []? Adjusted  2.  Patient will have a decrease in pain to <2/10 on average to facilitate improvement in movement, function, and ADLs as indicated by Functional Deficits. []? Progressing: []? Met: []? Not Met: []? Adjusted     Long Term Goals: To be achieved in: 5 weeks  1. Disability index score of 38% or less for the LEFS to assist with reaching prior level of function. []? Progressing: []? Met: []? Not Met: []? Adjusted  2. Patient will demonstrate increased AROM to 130 deg L knee flexion, 120deg L hip flexion to allow for proper joint functioning as indicated by patients Functional Deficits. []? Progressing: []? Met: []? Not Met: []? Adjusted  3. Patient will demonstrate an increase in Strength to at least 4+/5 L hip flexion, abduction, extension to allow for proper functional mobility as indicated by patients Functional Deficits. []? Progressing: []? Met: []? Not Met: []? Adjusted  4. Patient will return to functional activities including biking 2 miles without increased symptoms or restriction. []? Progressing: []? Met: []? Not Met: []? Adjusted  5. Patient will have a decrease in pain to 0/10 with stair navigation (12 steps), walking 300ft and standing for 30 min. []? Progressing: []? Met: []? Not Met: []? Adjusted            Overall Progression Towards Functional goals/ Treatment Progress Update:  [] Patient is progressing as expected towards functional goals listed. [] Progression is slowed due to complexities/Impairments listed. [] Progression has been slowed due to co-morbidities.   [x] Plan just implemented, too soon to assess goals progression <30days   [] Goals require adjustment due to lack of progress  [] Patient is not progressing as expected and requires additional follow up with physician  [] Other    Persisting Functional Limitations/Impairments:  []Sleeping []Sitting               [x]Standing [x]Transfers        [x]Walking [x]Kneeling               [x]Stairs [x]Squatting / bending   []ADLs []Reaching  [x]Lifting  [x]Housework  []Driving []Job related tasks  [x]Sports/Recreation []Other:      ASSESSMENT:  See eval  Treatment/Activity Tolerance:  [x] Patient able to complete tx  [] Patient limited by fatigue  [] Patient limited by pain  [] Patient limited by other medical complications  [] Other:     Prognosis: [x] Good [] Fair  [] Poor    Patient Requires Follow-up: [x] Yes  [] No    Plan for next treatment session: SEE FLOW ABOVE    PLAN: See eval. PT 1x every other week for 3-4 weeks. [] Continue per plan of care [] Alter current plan (see comments)  [x] Plan of care initiated [] Hold pending MD visit [] Discharge    Electronically signed by: Chas Crespo, PT PT, DPT    Note: If patient does not return for scheduled/ recommended follow up visits, this note will serve as a discharge from care along with most recent update on progress.

## 2022-03-07 NOTE — PLAN OF CARE
[place], [facility], [address]  Phone: [phone]   Fax: [phone]                                                       Physical Therapy Certification    Dear Referring Practitioner: Ulysses Boots, MD,    We had the pleasure of evaluating the following patient for physical therapy services at [address] [place] [address]. A summary of our findings can be found in the initial assessment below. This includes our plan of care. If you have any questions or concerns regarding these findings, please do not hesitate to contact me at the office phone number checked above. Thank you for the referral.       Physician Signature:_______________________________Date:__________________  By signing above (or electronic signature), therapists plan is approved by physician      Patient: Micky Rizo   : 1965   MRN: [id number]  Referring Physician: Referring Practitioner: Ulysses Boots, MD      Evaluation Date: 3/7/2022      Medical Diagnosis Information:  Diagnosis: M76.32 (ICD-10-CM) - Iliotibial band syndrome of left side   Treatment Diagnosis: knee pain, LE weakness, decreased activity tolerance, impaired gait                                         Insurance information: PT Insurance Information: [facility]     Precautions/ Contra-indications:   Latex Allergy:  [x]NO      []YES  Preferred Language for Healthcare:   [x]English       []Other:    C-SSRS Triggered by Intake questionnaire (Past 2 wk assessment ):   [x] No, Questionnaire did not trigger screening.   [] Yes, Patient intake triggered C-SSRS Screening     [] Completed, no further action required. [] Completed, PCP notified via Epic    SUBJECTIVE: Patient stated complaint: the patient reports his left knee and hip have been more painful lately, and this was improved after hip injection.  He has reported problems ever since replacement of left knee, and believes his lack of knee flexion (and compliance with exercise at the time) is to blame for resultant problems now. He reports riding a stationary bike and a physical bike were becoming difficult and painful - which is one of his favorite activities. He comments hiking is also a limited activity, and he notices a limp when walking. He saw Ntay Chavarria MD for the hip and Shelton Farnsworth MD did an injection. As the day progresses, his symptoms get worse. His goal is to improve L knee flexion (comparable to R knee), and to be able to walk and bike with less discomfort. He was ordered a bone scan, but is concerned about cost and may not get one performed due to cost. He also shares he hopes to come to PT only once every other week due to cost. He acknowledges his accountability and compliance with HEP have historically been poor, but at reduced frequency to save money. - he must do them to see improvement. Relevant Medical History: OA; history of knee replacements  Functional Outcome: LEFS: raw score = 41/80; dysfunction = 49%    Pain Scale: 2-9/10  Easing factors: biofreeze, pain medication, anti-inflammatory topical  Provocative factors: weather (cold, rain), biking, walking    Type: [x]Constant   []Intermittent  []Radiating []Localized []other:     Numbness/Tingling: denies    Occupation/School:     Living Status/Prior Level of Function:Prior to this injury / incident, pt was independent with ADLs and IADLs. He has limited his more energetic activities in presence of pain.      OBJECTIVE:   Palpation: +1 TTP L ITB, lateral quad    Functional Mobility/Transfers: WFL; did not observe kneeling this date    Posture: WFL    Bandages/Dressings/Incisions: healed bilateral TKA scars    Gait: (include devices/WB status): LLE toe out throughout gait, decreased L knee flexion in terminal stance, compensated trendelenburg over LLE in midstance, limited push-off (M19.91)  []Gout   Cardiovascular conditions   []Hypertension (I10)  []Hyperlipidemia (E78.5)  []Angina pectoris (I20)  []Atherosclerosis (I70)  []Pacemaker  []Hx of CABG/stent/  cardiac surgeries   Musculoskeletal conditions   []Disc pathology   []Congenital spine pathologies   []Osteoporosis (M81.8)  []Osteopenia (M85.8)  []Scoliosis       Endocrine conditions   []Hypothyroid (E03.9)  []Hyperthyroid Gastrointestinal conditions   []Constipation (Y84.00)   Metabolic conditions   []Morbid obesity (E66.01)  []Diabetes type 1(E10.65) or 2 (E11.65)   []Neuropathy (G60.9)     Cardio/Pulmonary conditions   []Asthma (J45)  []Coughing   []COPD (J44.9)  []CHF  []A-fib   Psychological Disorders  []Anxiety (F41.9)  []Depression (F32.9)   []Other:   Developmental Disorders  []Autism (F84.0)  []CP (G80)  []Down Syndrome (Q90.9)  []Developmental delay     Neurological conditions  []Prior Stroke (I69.30)  []Parkinson's (G20)  []Encephalopathy (G93.40)  []MS (G35)  []Post-polio (G14)  []SCI  []TBI  []ALS Other conditions  []Fibromyalgia (M79.7)  []Vertigo  []Syncope  []Kidney Failure  []Cancer      []currently undergoing                treatment  []Pregnancy  []Incontinence   Prior surgeries  []involved limb  []previous spinal surgery  [] section birth  []hysterectomy  []bowel / bladder surgery  [x]other relevant surgeries (TKAs Bilat)   []Other:              Barriers to/and or personal factors that will affect rehab potential:              []Age  []Sex    []Smoker              []Motivation/Lack of Motivation                        [x]Co-Morbidities              []Cognitive Function, education/learning barriers              []Environmental, home barriers              []profession/work barriers  [x]past PT/medical experience  [x]other: past difficulty with HEP compliance; financial barriers  Justification: past PT experience facilitates results; 1x/week every other week slows potential/prognosis, OA may marginally impact potential, past HEP compliance may negatively affect potential, financial barrier will affect frequency of care    Falls Risk Assessment (30 days):   [x] Falls Risk assessed and no intervention required. [] Falls Risk assessed and Patient requires intervention due to being higher risk   TUG score (>12s at risk):     [] Falls education provided, including        ASSESSMENT: The patient is a 60year old male who presents with left hip and lateral knee pain due to decreased knee/hip ROM, strength and endurance. The patient will benefit from course of skilled PT services to address deficits in ROM, strength, joint mobility, flexibility, activity tolerance, confidence and any psychosocial factors surrounding his presence of artifical knee joints, which will facilitate return to PLOF, biking, hiking and kayaking.     Functional Impairments:     [x]Noted lumbar/proximal hip/LE joint hypomobility   [x]Decreased LE functional ROM   [x]Decreased core/proximal hip strength and neuromuscular control   [x]Decreased LE functional strength   []Reduced balance/proprioceptive control   []other:      Functional Activity Limitations (from functional questionnaire and intake)   [x]Reduced ability to tolerate prolonged functional positions   [x]Reduced ability or difficulty with changes of positions or transfers between positions   [x]Reduced ability to maintain good posture and demonstrate good body mechanics with sitting, bending, and lifting   [x]Reduced ability to sleep   [] Reduced ability or tolerance with driving and/or computer work   [x]Reduced ability to perform lifting, carrying tasks   [x]Reduced ability to squat   []Reduced ability to forward bend   [x]Reduced ability to ambulate prolonged functional periods/distances/surfaces   [x]Reduced ability to ascend/descend stairs   []Reduced ability to run, hop, cut or jump   []other:    Participation Restrictions   []Reduced participation in self care activities   []Reduced participation in home management activities   []Reduced participation in work activities   [x]Reduced participation in social activities. [x]Reduced participation in sport/recreation activities. Classification :    [x]Signs/symptoms consistent with post-surgical status (>1 year) including decreased ROM, strength and function.    []Signs/symptoms consistent with joint sprain/strain   []Signs/symptoms consistent with patella-femoral syndrome   [x]Signs/symptoms consistent with knee OA/hip OA   []Signs/symptoms consistent with internal derangement of knee/Hip   []Signs/symptoms consistent with functional hip weakness/NMR control      []Signs/symptoms consistent with tendinitis/tendinosis    []signs/symptoms consistent with pathology which may benefit from Dry needling      []other:      Prognosis/Rehab Potential:      []Excellent   [x]Good    []Fair   []Poor    Tolerance of evaluation/treatment:    []Excellent   [x]Good    []Fair   []Poor    Physical Therapy Evaluation Complexity Justification  [x] A history of present problem with:  [] no personal factors and/or comorbidities that impact the plan of care;  []1-2 personal factors and/or comorbidities that impact the plan of care  [x]3 personal factors and/or comorbidities that impact the plan of care  [x] An examination of body systems using standardized tests and measures addressing any of the following: body structures and functions (impairments), activity limitations, and/or participation restrictions;:  [] a total of 1-2 or more elements   [x] a total of 3 or more elements   [] a total of 4 or more elements   [x] A clinical presentation with:  [x] stable and/or uncomplicated characteristics   [] evolving clinical presentation with changing characteristics  [] unstable and unpredictable characteristics;   [x] Clinical decision making of [x] Low, [] moderate, [] high complexity using standardized patient assessment instrument and/or measurable assessment of of 38% or less for the LEFS to assist with reaching prior level of function. [] Progressing: [] Met: [] Not Met: [] Adjusted  2. Patient will demonstrate increased AROM to 130 deg L knee flexion, 120deg L hip flexion to allow for proper joint functioning as indicated by patients Functional Deficits. [] Progressing: [] Met: [] Not Met: [] Adjusted  3. Patient will demonstrate an increase in Strength to at least 4+/5 L hip flexion, abduction, extension to allow for proper functional mobility as indicated by patients Functional Deficits. [] Progressing: [] Met: [] Not Met: [] Adjusted  4. Patient will return to functional activities including biking 2 miles without increased symptoms or restriction. [] Progressing: [] Met: [] Not Met: [] Adjusted  5. Patient will have a decrease in pain to 0/10 with stair navigation (12 steps), walking 300ft and standing for 30 min.   [] Progressing: [] Met: [] Not Met: [] Adjusted     Electronically signed by:  Virgilio Henderson, PT, DPT, OMT-C

## 2022-03-14 DIAGNOSIS — M16.12 ARTHRITIS OF LEFT HIP: Primary | ICD-10-CM

## 2022-03-22 ENCOUNTER — HOSPITAL ENCOUNTER (OUTPATIENT)
Dept: PHYSICAL THERAPY | Age: 57
Setting detail: THERAPIES SERIES
Discharge: HOME | End: 2022-03-22
Payer: COMMERCIAL

## 2022-03-22 PROCEDURE — 97140 MANUAL THERAPY 1/> REGIONS: CPT

## 2022-03-22 PROCEDURE — 97530 THERAPEUTIC ACTIVITIES: CPT

## 2022-03-22 PROCEDURE — 97110 THERAPEUTIC EXERCISES: CPT

## 2022-03-22 NOTE — FLOWSHEET NOTE
[facility]  Outpatient Physical Therapy  Phone: [phone]   Fax: [phone]    Physical Therapy Daily Treatment Note  Date:  3/22/2022    Patient Name:  Shireen De Leon    :  1965  MRN: [id number]  Medical/Treatment Diagnosis Information:  · Diagnosis: M76.32 (ICD-10-CM) - Iliotibial band syndrome of left side  · Treatment Diagnosis: knee pain, LE weakness, decreased activity tolerance, impaired gait  Insurance/Certification information:  PT Insurance Information: [employer]  Physician Information:  Referring Practitioner: Ceci Yates MD  Plan of care signed (Y/N): [x]  Yes []  No     Date of Patient follow up with Physician:      Progress Report: []  Yes  [x]  No     Date Range for reporting period:  Beginning: 3/7/22  Ending: TBD    Progress report due (10 Rx/or 30 days whichever is less): visit #3    Recertification due (POC duration/ or 90 days whichever is less): visit #3     Visit # Insurance Allowable Auth required? Date Range   eval + 1 PMN []  Yes  [x]  No      Latex Allergy:  [x]NO      []YES  Preferred Language for Healthcare:   [x]English       []other:    Functional Scale:        Date assessed:  LEFS: raw score = 41/80; dysfunction = 49%  3/7/22    Pain level:  5/10     SUBJECTIVE:  The patient reports he pulled his hamstring helping someone do drywall and had to stand up and down several times from the floor, which increased his right hamstring tightness and pain. He reports he could barely walk for about 5 days after, but is doing better now. By the end of day, he finds ice is the most helpful. He is icing up to 45 min to 1 hour; one on his hip and both side of his knee. When he able to perform HEP twice a day, he notices less pain with walking, even if it is only for 30 minutes. He has been biking 5-10 min, then doing HEP.     OBJECTIVE:   3/22 - patient unable to perform lunge matrix due to L knee weakness/pain in CKC    RESTRICTIONS/PRECAUTIONS: Exercises/Interventions:     Therapeutic Exercises (65008) Resistance / level Sets/sec Reps Notes   Knee flexion on step stretch     ITB wall stretch     Reverse clam     Standing HF stretch     SLR Abd            Piriformis S - hooklying  30'' 4 L 3/22   Clamshells orange 2 12 L 3/22          HEP/medbridge setup   8' 3/22          Therapeutic Activities (46239)       Biking    TM walk L1  5  min 3/22   Lunge Matrix   Attempted, stopped due to pain/inability 3/22   Runner's Step Up 6'' 1 10 L 3/22   Lateral Band Walk Orange (ankles)  12ft R/L 3 3/22          Neuromuscular Re-ed (59944)                                                 Manual Intervention (20363) - 8'       L hip distraction G3-4  10x    Cross body stretch - glute/ITB   3 x 30''                                  Modalities:     Pt. Education:  3/22 - PT assists patient in downloading and setting up both HEP routines in [facility] on his phone, as well as how to navigate between both. - 8'    -patient educated on diagnosis, prognosis and expectations for rehab  -all patient questions were answered    Home Exercise Program:  Access Code: [email or web address]  URL: [email or web address]  Date: 03/22/2022  Prepared by: Isabel Beth    Exercises  Clamshell with Resistance - 1 x daily - 3-4 x weekly - 3 sets - 10 reps  Supine Gluteus Stretch - 1 x daily - 3-4 x weekly - 2 sets - 2 reps - 20-30 hold  Side Stepping with Resistance at Ankles - 1 x daily - 3-4 x weekly - 1 sets - 3 reps - 10-15 feet  Supine Piriformis Stretch with Foot on Ground - 1 x daily - 3-4 x weekly - 2 sets - 2 reps - 20-30 hold  Runner's Step Up/Down - 1 x daily - 3-4 x weekly - 2 sets - 10 reps    Access Code: [email or web address]  URL: [employer]/  Date: 03/07/2022  Prepared by: Isabel Ignacia     Exercises  ITB Stretch at Wall - 2 x daily - 5-6 x weekly - 1 sets - 4 reps - 10 hold  Standing Knee Flexion Stretch on Step - 2 x daily - 5-6 x weekly - 1 sets - 10 reps - 5 hold  Sidelying Reverse Clamshell - 2 x daily - 5-6 x weekly - 2 sets - 15 reps  Standing Hip Flexor Stretch - 2 x daily - 5-6 x weekly - 1 sets - 5 reps - 10 hold  Sidelying Hip Abduction - 1-2 x daily - 6 x weekly - 3 sets - 10 reps    Therapeutic Exercise and NMR EXR  [x] (99594) Provided verbal/tactile cueing for activities related to strengthening, flexibility, endurance, ROM for improvements in  [x] LE / Lumbar: LE, proximal hip, and core control with self care, mobility, lifting, ambulation. [] UE / Cervical: cervical, postural, scapular, scapulothoracic and UE control with self care, reaching, carrying, lifting, house/yardwork, driving, computer work.  [] (66235) Provided verbal/tactile cueing for activities related to improving balance, coordination, kinesthetic sense, posture, motor skill, proprioception to assist with   [] LE / lumbar: LE, proximal hip, and core control in self care, mobility, lifting, ambulation and eccentric single leg control. [] UE / cervical: cervical, scapular, scapulothoracic and UE control with self care, reaching, carrying, lifting, house/yardwork, driving, computer work.   [] (86851) Therapist is in constant attendance of 2 or more patients providing skilled therapy interventions, but not providing any significant amount of measurable one-on-one time to either patient, for improvements in  [] LE / lumbar: LE, proximal hip, and core control in self care, mobility, lifting, ambulation and eccentric single leg control. [] UE / cervical: cervical, scapular, scapulothoracic and UE control with self care, reaching, carrying, lifting, house/yardwork, driving, computer work.      NMR and Therapeutic Activities:    [x] (85449 or 98999) Provided verbal/tactile cueing for activities related to improving balance, coordination, kinesthetic sense, posture, motor skill, proprioception and motor activation to allow for proper function of   [x] LE: / Lumbar core, proximal hip and LE with self care and ADLs  [] UE / Cervical: cervical, postural, scapular, scapulothoracic and UE control with self care, carrying, lifting, driving, computer work.   [] (80518) Gait Re-education- Provided training and instruction to the patient for proper LE, core and proximal hip recruitment and positioning and eccentric body weight control with ambulation re-education including up and down stairs     Home Management Training / Self Care:  [] (00300) Provided self-care/home management training related to activities of daily living and compensatory training, and/or use of adaptive equipment for improvement with: ADLs and compensatory training, meal preparation, safety procedures and instruction in use of adaptive equipment, including bathing, grooming, dressing, personal hygiene, basic household cleaning and chores.      Home Exercise Program:    [x] (31766) Reviewed/Progressed HEP activities related to strengthening, flexibility, endurance, ROM of   [x] LE / Lumbar: core, proximal hip and LE for functional self-care, mobility, lifting and ambulation/stair navigation   [] UE / Cervical: cervical, postural, scapular, scapulothoracic and UE control with self care, reaching, carrying, lifting, house/yardwork, driving, computer work  [] (37789)Reviewed/Progressed HEP activities related to improving balance, coordination, kinesthetic sense, posture, motor skill, proprioception of   [] LE: core, proximal hip and LE for self care, mobility, lifting, and ambulation/stair navigation    [] UE / Cervical: cervical, postural,  scapular, scapulothoracic and UE control with self care, reaching, carrying, lifting, house/yardwork, driving, computer work    Manual Treatments:  PROM / STM / Oscillations-Mobs:  G-I, II, III, IV (PA's, Inf., Post.)  [x] (02124) Provided manual therapy to mobilize LE, proximal hip and/or LS spine soft tissue/joints for the purpose of modulating pain, promoting relaxation,  increasing ROM, reducing/eliminating soft tissue swelling/inflammation/restriction, improving soft tissue extensibility and allowing for proper ROM for normal function with   [x] LE / lumbar: self care, mobility, lifting and ambulation. [] UE / Cervical: self care, reaching, carrying, lifting, house/yardwork, driving, computer work. Modalities:  [] (53084) Vasopneumatic compression: Utilized vasopneumatic compression to decrease edema / swelling for the purpose of improving mobility and quad tone / recruitment which will allow for increased overall function including but not limited to self-care, transfers, ambulation, and ascending / descending stairs. Charges:  Timed Code Treatment Minutes: 52   Total Treatment Minutes: 52     [] EVAL - LOW (23776)   [] EVAL - MOD (74221)  [] EVAL - HIGH (24062)  [] RE-EVAL (76883)  [x] ZP(05229) x 1      [] Ionto  [] NMR (40057) x       [] Vaso  [x] Manual (42007) x 1      [] Ultrasound  [x] TA x 1        [] Mech Traction (66051)  [] Aquatic Therapy x      [] ES (un) (96289):   [] Home Management Training x  [] ES(attended) (35204)   [] Dry Needling 1-2 muscles (63853):  [] Dry Needling 3+ muscles (566611)  [] Group:      [] Other:     GOALS:  Patient stated goal: stretch L knee  []? Progressing: []? Met: []? Not Met: []? Adjusted     Therapist goals for Patient:   Short Term Goals: To be achieved in: 2 weeks  1. Independent in HEP and progression per patient tolerance, in order to prevent re-injury. [x]? Progressing: []? Met: []? Not Met: []? Adjusted  2. Patient will have a decrease in pain to <2/10 on average to facilitate improvement in movement, function, and ADLs as indicated by Functional Deficits. []? Progressing: []? Met: []? Not Met: []? Adjusted     Long Term Goals: To be achieved in: 5 weeks  1. Disability index score of 38% or less for the LEFS to assist with reaching prior level of function. []? Progressing: []? Met: []? Not Met: []? Adjusted  2.  Patient will demonstrate increased AROM to 130 deg L knee flexion, 120deg L hip flexion to allow for proper joint functioning as indicated by patients Functional Deficits. []? Progressing: []? Met: []? Not Met: []? Adjusted  3. Patient will demonstrate an increase in Strength to at least 4+/5 L hip flexion, abduction, extension to allow for proper functional mobility as indicated by patients Functional Deficits. []? Progressing: []? Met: []? Not Met: []? Adjusted  4. Patient will return to functional activities including biking 2 miles without increased symptoms or restriction. []? Progressing: []? Met: []? Not Met: []? Adjusted  5. Patient will have a decrease in pain to 0/10 with stair navigation (12 steps), walking 300ft and standing for 30 min. []? Progressing: []? Met: []? Not Met: []? Adjusted          Overall Progression Towards Functional goals/ Treatment Progress Update:  [] Patient is progressing as expected towards functional goals listed. [] Progression is slowed due to complexities/Impairments listed. [] Progression has been slowed due to co-morbidities. [x] Plan just implemented, too soon to assess goals progression <30days   [] Goals require adjustment due to lack of progress  [] Patient is not progressing as expected and requires additional follow up with physician  [] Other    Persisting Functional Limitations/Impairments:  []Sleeping []Sitting               [x]Standing [x]Transfers        [x]Walking [x]Kneeling               [x]Stairs [x]Squatting / bending   []ADLs []Reaching  [x]Lifting  [x]Housework  []Driving []Job related tasks  [x]Sports/Recreation []Other:      ASSESSMENT:  Today's session focused on additional mobility and strengthening aimed at L hip and knee in OKC/CKC positions. The patient had HEP developed and setup on his phone for dual HEP access so that he can alternate days of performance to prevent his routine from getting stale.  The patient could not perform lunge matrix in LLE stance due to weakness and pain, so this was stopped. The patient required cues and demo for each new intervention this date to ensure proper performance. All questions answered to patient satisfaction. Treatment/Activity Tolerance:  [x] Patient able to complete tx  [] Patient limited by fatigue  [] Patient limited by pain  [] Patient limited by other medical complications  [] Other:     Prognosis: [x] Good [] Fair  [] Poor    Patient Requires Follow-up: [x] Yes  [] No    Plan for next treatment session: SEE FLOW ABOVE    PLAN: See eval. PT 1x every other week for 3-4 weeks. [] Continue per plan of care [] Alter current plan (see comments)  [x] Plan of care initiated [] Hold pending MD visit [] Discharge    Electronically signed by: Aleksandr Kumar, PT PT, DPT    Note: If patient does not return for scheduled/ recommended follow up visits, this note will serve as a discharge from care along with most recent update on progress.

## 2022-03-22 NOTE — PATIENT INSTRUCTIONS
Lakia Chu was seen today for annual exam.    Diagnoses and all orders for this visit:    Preventative health care    Good's esophagus with esophagitis    Primary osteoarthritis of both knees  -     traMADol (ULTRAM) 50 MG tablet; Take 1 tablet by mouth 3 times daily as needed for Pain . Diastasis recti          Do PT for Diastasis Recti. Reviewed labs with patient, will watch carb/sugar/fat intake. Quality 226: Preventive Care And Screening: Tobacco Use: Screening And Cessation Intervention: Patient screened for tobacco use and is an ex/non-smoker Quality 110: Preventive Care And Screening: Influenza Immunization: Influenza Immunization Administered during Influenza season Detail Level: Detailed

## 2022-03-28 ENCOUNTER — ANESTHESIA EVENT (OUTPATIENT)
Dept: ENDOSCOPY | Age: 57
End: 2022-03-28
Payer: COMMERCIAL

## 2022-03-29 ENCOUNTER — HOSPITAL ENCOUNTER (OUTPATIENT)
Age: 57
Setting detail: OUTPATIENT SURGERY
Discharge: HOME | End: 2022-03-29
Attending: INTERNAL MEDICINE | Admitting: INTERNAL MEDICINE
Payer: COMMERCIAL

## 2022-03-29 ENCOUNTER — ANESTHESIA (OUTPATIENT)
Dept: ENDOSCOPY | Age: 57
End: 2022-03-29
Payer: COMMERCIAL

## 2022-03-29 VITALS
BODY MASS INDEX: 33.62 KG/M2 | SYSTOLIC BLOOD PRESSURE: 120 MMHG | HEART RATE: 77 BPM | OXYGEN SATURATION: 100 % | HEIGHT: 72 IN | RESPIRATION RATE: 16 BRPM | WEIGHT: 248.25 LBS | DIASTOLIC BLOOD PRESSURE: 74 MMHG | TEMPERATURE: 97 F

## 2022-03-29 VITALS — SYSTOLIC BLOOD PRESSURE: 133 MMHG | OXYGEN SATURATION: 99 % | DIASTOLIC BLOOD PRESSURE: 96 MMHG

## 2022-03-29 PROCEDURE — 2580000003 HC RX 258: Performed by: ANESTHESIOLOGY

## 2022-03-29 PROCEDURE — 6360000002 HC RX W HCPCS: Performed by: NURSE ANESTHETIST, CERTIFIED REGISTERED

## 2022-03-29 PROCEDURE — 3700000000 HC ANESTHESIA ATTENDED CARE: Performed by: INTERNAL MEDICINE

## 2022-03-29 PROCEDURE — 3700000001 HC ADD 15 MINUTES (ANESTHESIA): Performed by: INTERNAL MEDICINE

## 2022-03-29 PROCEDURE — 7100000010 HC PHASE II RECOVERY - FIRST 15 MIN: Performed by: INTERNAL MEDICINE

## 2022-03-29 PROCEDURE — 2500000003 HC RX 250 WO HCPCS: Performed by: NURSE ANESTHETIST, CERTIFIED REGISTERED

## 2022-03-29 PROCEDURE — 7100000011 HC PHASE II RECOVERY - ADDTL 15 MIN: Performed by: INTERNAL MEDICINE

## 2022-03-29 PROCEDURE — 3609027000 HC COLONOSCOPY: Performed by: INTERNAL MEDICINE

## 2022-03-29 RX ORDER — SODIUM CHLORIDE 0.9 % (FLUSH) 0.9 %
10 SYRINGE (ML) INJECTION PRN
Status: DISCONTINUED | OUTPATIENT
Start: 2022-03-29 | End: 2022-03-29 | Stop reason: HOSPADM

## 2022-03-29 RX ORDER — SODIUM CHLORIDE 0.9 % (FLUSH) 0.9 %
5-40 SYRINGE (ML) INJECTION PRN
Status: DISCONTINUED | OUTPATIENT
Start: 2022-03-29 | End: 2022-03-29 | Stop reason: HOSPADM

## 2022-03-29 RX ORDER — ONDANSETRON 2 MG/ML
4 INJECTION INTRAMUSCULAR; INTRAVENOUS
Status: DISCONTINUED | OUTPATIENT
Start: 2022-03-29 | End: 2022-03-29 | Stop reason: HOSPADM

## 2022-03-29 RX ORDER — SODIUM CHLORIDE 9 MG/ML
INJECTION, SOLUTION INTRAVENOUS PRN
Status: DISCONTINUED | OUTPATIENT
Start: 2022-03-29 | End: 2022-03-29 | Stop reason: HOSPADM

## 2022-03-29 RX ORDER — LIDOCAINE HYDROCHLORIDE 20 MG/ML
INJECTION, SOLUTION EPIDURAL; INFILTRATION; INTRACAUDAL; PERINEURAL PRN
Status: DISCONTINUED | OUTPATIENT
Start: 2022-03-29 | End: 2022-03-29 | Stop reason: SDUPTHER

## 2022-03-29 RX ORDER — PROPOFOL 10 MG/ML
INJECTION, EMULSION INTRAVENOUS PRN
Status: DISCONTINUED | OUTPATIENT
Start: 2022-03-29 | End: 2022-03-29 | Stop reason: SDUPTHER

## 2022-03-29 RX ORDER — SODIUM CHLORIDE 0.9 % (FLUSH) 0.9 %
10 SYRINGE (ML) INJECTION EVERY 12 HOURS SCHEDULED
Status: DISCONTINUED | OUTPATIENT
Start: 2022-03-29 | End: 2022-03-29 | Stop reason: HOSPADM

## 2022-03-29 RX ORDER — SODIUM CHLORIDE 9 MG/ML
INJECTION, SOLUTION INTRAVENOUS CONTINUOUS
Status: DISCONTINUED | OUTPATIENT
Start: 2022-03-29 | End: 2022-03-29 | Stop reason: HOSPADM

## 2022-03-29 RX ORDER — SODIUM CHLORIDE 0.9 % (FLUSH) 0.9 %
5-40 SYRINGE (ML) INJECTION EVERY 12 HOURS SCHEDULED
Status: DISCONTINUED | OUTPATIENT
Start: 2022-03-29 | End: 2022-03-29 | Stop reason: HOSPADM

## 2022-03-29 RX ORDER — SODIUM CHLORIDE 9 MG/ML
25 INJECTION, SOLUTION INTRAVENOUS PRN
Status: DISCONTINUED | OUTPATIENT
Start: 2022-03-29 | End: 2022-03-29 | Stop reason: HOSPADM

## 2022-03-29 RX ADMIN — PROPOFOL 100 MG: 10 INJECTION, EMULSION INTRAVENOUS at 09:05

## 2022-03-29 RX ADMIN — PROPOFOL 50 MG: 10 INJECTION, EMULSION INTRAVENOUS at 09:09

## 2022-03-29 RX ADMIN — SODIUM CHLORIDE: 9 INJECTION, SOLUTION INTRAVENOUS at 08:32

## 2022-03-29 RX ADMIN — PROPOFOL 50 MG: 10 INJECTION, EMULSION INTRAVENOUS at 09:12

## 2022-03-29 RX ADMIN — PROPOFOL 20 MG: 10 INJECTION, EMULSION INTRAVENOUS at 09:15

## 2022-03-29 RX ADMIN — LIDOCAINE HYDROCHLORIDE 60 MG: 20 INJECTION, SOLUTION EPIDURAL; INFILTRATION; INTRACAUDAL; PERINEURAL at 09:05

## 2022-03-29 ASSESSMENT — PAIN SCALES - GENERAL
PAINLEVEL_OUTOF10: 0

## 2022-03-29 ASSESSMENT — PAIN - FUNCTIONAL ASSESSMENT: PAIN_FUNCTIONAL_ASSESSMENT: 0-10

## 2022-03-29 NOTE — OP NOTE
Operative Note      Patient: Roney Page  YOB: 1965  MRN: [id number]    Date of Procedure: 3/29/2022    Pre-Op Diagnosis: Family history of prostate cancer    Post-Op Diagnosis: Same       Procedure(s):  COLONOSCOPY DIAGNOSTIC    Surgeon(s):  Jessica Hebert MD    Assistant:   * No surgical staff found *    Anesthesia: Monitor Anesthesia Care    Estimated Blood Loss (mL): None    Complications: None    Specimens:   * No specimens in log *    Implants:  * No implants in log *      Drains: * No LDAs found *    Findings: See dictated report    Detailed Description of Procedure:   See dictated report    Electronically signed by Jessica Hebert MD on 3/29/2022 at 9:22 AM
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
## 2022-03-29 NOTE — PROCEDURES
[address]           [address] [facility]                               COLONOSCOPY REPORT    PATIENT NAME: Bart Mott                  :        1965  MED REC NO:   [id number]                          ROOM:  ACCOUNT NO:   [de-identified]                           ADMIT DATE: 2022  PROVIDER:     Daniela Stallworth MD    DATE OF PROCEDURE:  2022    REFERRING PROVIDER:  BROCK Mcdonough    PATIENT HISTORY:  A 17-year-old male, outpatient. INSTRUMENT USED:  Olympus PCF-H180AL. MEDICATIONS OF PROCEDURE:  The patient was premedicated with Diprivan  intravenously as administered by the anesthesiology service. INDICATIONS:  The patient presents for colon cancer screening. He is  very concerned about a family history of prostate cancer. DESCRIPTION OF PROCEDURE:  The digital and anal exams were normal.   Specifically, the prostate appeared normal on palpation. The  colonoscope was inserted into the rectal vault and advanced to the  cecum. With irrigation, the prep was good. There was incidental  sigmoid diverticulosis. No mucosal lesions were identified. ESTIMATED BLOOD LOSS:  None. IMPRESSION:  Sigmoid diverticulosis only. PLAN:  The patient should undergo a screening colonoscopy in 10 years. He is having some urinary symptoms and I have suggested that he see a  urologist for this. AISHWARYA Sung MD    D: 2022 9:35:54       T: 2022 9:39:22     [id number]  Job#: [id number]     Doc#: [id number]    CC:  Johna Kava A. Alphonzo Burkitt, MD
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
## 2022-03-29 NOTE — BRIEF OP NOTE
Brief Postoperative Note    Del Farrell  YOB: 1965  [id number]      Pre-operative Diagnosis: Screening    Previous Colonoscopy: Yes  When: 06/11/15  Greater than 3 years? Yes      Post-operative Diagnosis: Same    Procedure: Colonoscopy    The preparation was good.     Anesthesia: MAC    Surgeons/Assistants: Marika Joshi MD    Estimated Blood Loss: Nonbe    Complications: None    Specimens: Was Not Obtained    Findings: See dictateds report    Electronically signed by Marika Joshi MD on 7/10/2017 at 7:45 AM

## 2022-04-04 ENCOUNTER — HOSPITAL ENCOUNTER (OUTPATIENT)
Dept: PHYSICAL THERAPY | Age: 57
Setting detail: THERAPIES SERIES
Discharge: HOME | End: 2022-04-04

## 2022-04-04 NOTE — FLOWSHEET NOTE
Physical Therapy  Cancellation/No-show Note  Patient Name:  Lou Shah  :  1965   Date:  2022  Cancelled visits to date: 1  No-shows to date: 0    Patient status for today's appointment patient:  [x]  Cancelled 22  []  Rescheduled appointment  []  No-show     Reason given by patient:  []  Patient ill  []  Conflicting appointment  []  No transportation    []  Conflict with work  [x]  No reason given  []  Other:     Comments:      Phone call information:   []  Phone call made today to patient at _ time at number provided:      []  Patient answered, conversation as follows:    []  Patient did not answer, message left as follows:  []  Phone call not made today  [x]  Phone call not needed - pt contacted us to cancel and provided reason for cancellation.      Electronically signed by:  Lowell Mccarthy PT

## 2022-05-19 ENCOUNTER — OFFICE VISIT (OUTPATIENT)
Dept: ORTHOPEDIC SURGERY | Age: 57
End: 2022-05-19
Payer: COMMERCIAL

## 2022-05-19 ENCOUNTER — HOSPITAL ENCOUNTER (OUTPATIENT)
Dept: GENERAL RADIOLOGY | Age: 57
Discharge: HOME | End: 2022-05-19
Payer: COMMERCIAL

## 2022-05-19 DIAGNOSIS — M16.12 ARTHRITIS OF LEFT HIP: Primary | ICD-10-CM

## 2022-05-19 DIAGNOSIS — M16.12 ARTHRITIS OF LEFT HIP: ICD-10-CM

## 2022-05-19 PROCEDURE — 6360000002 HC RX W HCPCS

## 2022-05-19 PROCEDURE — 77002 NEEDLE LOCALIZATION BY XRAY: CPT | Performed by: ORTHOPAEDIC SURGERY

## 2022-05-19 PROCEDURE — 20610 DRAIN/INJ JOINT/BURSA W/O US: CPT

## 2022-05-19 PROCEDURE — 99999 PR OFFICE/OUTPT VISIT,PROCEDURE ONLY: CPT | Performed by: ORTHOPAEDIC SURGERY

## 2022-05-19 PROCEDURE — 20610 DRAIN/INJ JOINT/BURSA W/O US: CPT | Performed by: ORTHOPAEDIC SURGERY

## 2022-05-19 PROCEDURE — 77002 NEEDLE LOCALIZATION BY XRAY: CPT

## 2022-05-19 PROCEDURE — 2500000003 HC RX 250 WO HCPCS

## 2022-06-16 RX ORDER — PANTOPRAZOLE SODIUM 40 MG/1
TABLET, DELAYED RELEASE ORAL
Qty: 90 TABLET | Refills: 0 | Status: SHIPPED | OUTPATIENT
Start: 2022-06-16 | End: 2022-08-26 | Stop reason: SDUPTHER

## 2022-06-22 DIAGNOSIS — E55.9 VITAMIN D DEFICIENCY: ICD-10-CM

## 2022-06-22 DIAGNOSIS — K21.9 GASTROESOPHAGEAL REFLUX DISEASE WITHOUT ESOPHAGITIS: ICD-10-CM

## 2022-06-22 DIAGNOSIS — R73.9 HYPERGLYCEMIA: ICD-10-CM

## 2022-06-22 DIAGNOSIS — Z12.5 SCREENING FOR PROSTATE CANCER: ICD-10-CM

## 2022-06-22 DIAGNOSIS — Z00.00 PREVENTATIVE HEALTH CARE: Primary | ICD-10-CM

## 2022-07-04 DIAGNOSIS — Z01.818 PREOP EXAMINATION: Primary | ICD-10-CM

## 2022-07-08 ENCOUNTER — HOSPITAL ENCOUNTER (OUTPATIENT)
Age: 57
Discharge: HOME | End: 2022-07-08
Payer: COMMERCIAL

## 2022-07-08 DIAGNOSIS — K21.9 GASTROESOPHAGEAL REFLUX DISEASE WITHOUT ESOPHAGITIS: ICD-10-CM

## 2022-07-08 DIAGNOSIS — Z00.00 PREVENTATIVE HEALTH CARE: ICD-10-CM

## 2022-07-08 DIAGNOSIS — R73.9 HYPERGLYCEMIA: ICD-10-CM

## 2022-07-08 DIAGNOSIS — E55.9 VITAMIN D DEFICIENCY: ICD-10-CM

## 2022-07-08 DIAGNOSIS — Z12.5 SCREENING FOR PROSTATE CANCER: ICD-10-CM

## 2022-07-08 LAB
A/G RATIO: 1.3 (ref 1.1–2.2)
ALBUMIN SERPL-MCNC: 3.8 G/DL (ref 3.4–5)
ALP BLD-CCNC: 107 U/L (ref 40–129)
ALT SERPL-CCNC: 22 U/L (ref 10–40)
ANION GAP SERPL CALCULATED.3IONS-SCNC: 12 MMOL/L (ref 3–16)
AST SERPL-CCNC: 21 U/L (ref 15–37)
BASOPHILS ABSOLUTE: 0.1 K/UL (ref 0–0.2)
BASOPHILS RELATIVE PERCENT: 0.7 %
BILIRUB SERPL-MCNC: 0.4 MG/DL (ref 0–1)
BUN BLDV-MCNC: 17 MG/DL (ref 7–20)
CALCIUM SERPL-MCNC: 9.1 MG/DL (ref 8.3–10.6)
CHLORIDE BLD-SCNC: 104 MMOL/L (ref 99–110)
CHOLESTEROL, FASTING: 166 MG/DL (ref 0–199)
CO2: 23 MMOL/L (ref 21–32)
CREAT SERPL-MCNC: 0.7 MG/DL (ref 0.9–1.3)
CREATININE URINE: 97.3 MG/DL (ref 39–259)
EOSINOPHILS ABSOLUTE: 0.1 K/UL (ref 0–0.6)
EOSINOPHILS RELATIVE PERCENT: 1 %
GFR AFRICAN AMERICAN: >60
GFR NON-AFRICAN AMERICAN: >60
GLUCOSE FASTING: 97 MG/DL (ref 70–99)
HCT VFR BLD CALC: 43.8 % (ref 40.5–52.5)
HDLC SERPL-MCNC: 42 MG/DL (ref 40–60)
HEMOGLOBIN: 15.1 G/DL (ref 13.5–17.5)
HEPATITIS C ANTIBODY INTERPRETATION: NORMAL
HIV AG/AB: NORMAL
HIV ANTIGEN: NORMAL
HIV-1 ANTIBODY: NORMAL
HIV-2 AB: NORMAL
LDL CHOLESTEROL CALCULATED: 103 MG/DL
LYMPHOCYTES ABSOLUTE: 1.5 K/UL (ref 1–5.1)
LYMPHOCYTES RELATIVE PERCENT: 17.3 %
MAGNESIUM: 2 MG/DL (ref 1.8–2.4)
MCH RBC QN AUTO: 30.4 PG (ref 26–34)
MCHC RBC AUTO-ENTMCNC: 34.4 G/DL (ref 31–36)
MCV RBC AUTO: 88.3 FL (ref 80–100)
MICROALBUMIN UR-MCNC: <1.2 MG/DL
MICROALBUMIN/CREAT UR-RTO: NORMAL MG/G (ref 0–30)
MONOCYTES ABSOLUTE: 0.8 K/UL (ref 0–1.3)
MONOCYTES RELATIVE PERCENT: 9.4 %
NEUTROPHILS ABSOLUTE: 6.3 K/UL (ref 1.7–7.7)
NEUTROPHILS RELATIVE PERCENT: 71.6 %
PDW BLD-RTO: 13.8 % (ref 12.4–15.4)
PLATELET # BLD: 256 K/UL (ref 135–450)
PMV BLD AUTO: 7.6 FL (ref 5–10.5)
POTASSIUM SERPL-SCNC: 4.2 MMOL/L (ref 3.5–5.1)
PROSTATE SPECIFIC ANTIGEN: 1.05 NG/ML (ref 0–4)
RBC # BLD: 4.96 M/UL (ref 4.2–5.9)
SODIUM BLD-SCNC: 139 MMOL/L (ref 136–145)
TOTAL PROTEIN: 6.8 G/DL (ref 6.4–8.2)
TRIGLYCERIDE, FASTING: 106 MG/DL (ref 0–150)
VITAMIN D 25-HYDROXY: 56.9 NG/ML
VLDLC SERPL CALC-MCNC: 21 MG/DL
WBC # BLD: 8.8 K/UL (ref 4–11)

## 2022-07-08 PROCEDURE — 83735 ASSAY OF MAGNESIUM: CPT

## 2022-07-08 PROCEDURE — 87390 HIV-1 AG IA: CPT

## 2022-07-08 PROCEDURE — 80061 LIPID PANEL: CPT

## 2022-07-08 PROCEDURE — 82043 UR ALBUMIN QUANTITATIVE: CPT

## 2022-07-08 PROCEDURE — 86803 HEPATITIS C AB TEST: CPT

## 2022-07-08 PROCEDURE — 82570 ASSAY OF URINE CREATININE: CPT

## 2022-07-08 PROCEDURE — 36415 COLL VENOUS BLD VENIPUNCTURE: CPT

## 2022-07-08 PROCEDURE — 83036 HEMOGLOBIN GLYCOSYLATED A1C: CPT

## 2022-07-08 PROCEDURE — 86702 HIV-2 ANTIBODY: CPT

## 2022-07-08 PROCEDURE — 85025 COMPLETE CBC W/AUTO DIFF WBC: CPT

## 2022-07-08 PROCEDURE — 86701 HIV-1ANTIBODY: CPT

## 2022-07-08 PROCEDURE — 82306 VITAMIN D 25 HYDROXY: CPT

## 2022-07-08 PROCEDURE — 84153 ASSAY OF PSA TOTAL: CPT

## 2022-07-08 PROCEDURE — 80053 COMPREHEN METABOLIC PANEL: CPT

## 2022-07-09 LAB
ESTIMATED AVERAGE GLUCOSE: 108.3 MG/DL
HBA1C MFR BLD: 5.4 %

## 2022-07-12 ENCOUNTER — PATIENT MESSAGE (OUTPATIENT)
Dept: FAMILY MEDICINE CLINIC | Age: 57
End: 2022-07-12

## 2022-07-12 NOTE — TELEPHONE ENCOUNTER
From: Shelly Garcia  To: Nydia Person  Sent: 7/12/2022 9:23 AM EDT  Subject: time change    Good morning, is there any possibility of coming in a little bit later than my 7:30am appt? I did call, but the answering service said nothing is available, however I just wanted to ask directly instead of through a 3rd party. If you have any openings that would allow me to leave your office by 11:45am it would make my day? If not, I will still be there at 7:30am.  You are welcome to call and leave a voice message ([phone]) or reply to this message is fine.   Thank you  Lew Zuniga

## 2022-07-13 ENCOUNTER — OFFICE VISIT (OUTPATIENT)
Dept: FAMILY MEDICINE CLINIC | Age: 57
End: 2022-07-13
Payer: COMMERCIAL

## 2022-07-13 VITALS
OXYGEN SATURATION: 99 % | HEART RATE: 66 BPM | DIASTOLIC BLOOD PRESSURE: 82 MMHG | BODY MASS INDEX: 34.86 KG/M2 | WEIGHT: 257 LBS | SYSTOLIC BLOOD PRESSURE: 136 MMHG | TEMPERATURE: 97.4 F

## 2022-07-13 DIAGNOSIS — Z01.818 PREOP EXAMINATION: ICD-10-CM

## 2022-07-13 DIAGNOSIS — M16.12 ARTHRITIS OF LEFT HIP: Primary | ICD-10-CM

## 2022-07-13 PROCEDURE — 93000 ELECTROCARDIOGRAM COMPLETE: CPT | Performed by: NURSE PRACTITIONER

## 2022-07-13 PROCEDURE — 99213 OFFICE O/P EST LOW 20 MIN: CPT | Performed by: NURSE PRACTITIONER

## 2022-07-13 NOTE — PROGRESS NOTES
Preoperative Consultation    Lalita Sahu  YOB: 1965    This patient presents to the office today for a preoperative consultation at the request of surgeon, Dr. Jonel Qureshi, who plans on performing total left hip arthroplasty on July 21 at [address] [place] [address].       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 Left     WANG'S NEUROMA    JOINT REPLACEMENT Bilateral     right, 3/18    KNEE ARTHROSCOPY Bilateral     SYNOVECTOMY, AND CHONDROPLASTY LEFT KNEE    OTHER SURGICAL HISTORY      wakes up fighting  and severe cough    OTHER SURGICAL HISTORY      acl removed    IA INCISE FINGER TENDON SHEATH Left 10/18/2018    LEFT RING FINGER TRIGGER RELEASE performed by Milana [facility] MD at [facility] Left 12/17/2019    LEFT TOTAL KNEE REPLACEMENT ROBOTIC ASSISTED performed by Kathi Fuentes MD at [address] [place] [address] TUNNEL RELEASE Left 10/7/14    ulnar nerve decompression at elbow    ULNAR TUNNEL RELEASE Right 39/34/06    UMBILICAL HERNIA REPAIR  6/25/15    UPPER GASTROINTESTINAL ENDOSCOPY      UPPER GASTROINTESTINAL ENDOSCOPY  11/16/2017    Dr Sp Oquendo Aspirin-Acetaminophen-Caffeine Rash     Pt states can take Aspirin, tylenol and caffeine separately but not combined      Excedrin Extra Strength [Aspirin-Acetaminophen-Caffeine] Rash    Other Rash     Red dye    Red Dye Rash     Outpatient Medications Marked as Taking for the 7/13/22 encounter (Office Visit) with Wanna Siemens, APRN - NP   Medication Sig Dispense Refill    diclofenac sodium (VOLTAREN) 1 % GEL Apply up to 4 times daily as needed for pain, #4 tubes, 5 RF 64 g 5    pantoprazole (PROTONIX) 40 MG tablet TAKE 1 TABLET DAILY 90 tablet 0    Menthol, Topical Analgesic, (BIOFREEZE) 4 % GEL Apply topically         Social History     Tobacco Use    Smoking status: Never Smoker    Smokeless tobacco: Never Used   Substance Use Topics    Alcohol use: Yes     Alcohol/week: 6.0 standard drinks     Types: 6 Cans of beer per week     Comment: socially     Family History   Problem Relation Age of Onset    Breast Cancer Mother     Cancer Father         prostate    Heart Disease Paternal Aunt        Review of Systems  A comprehensive review of systems was negative except for what was noted in the HPI. Physical Exam   /82 (Site: Right Upper Arm, Position: Sitting, Cuff Size: Large Adult)   Pulse 66   Temp 97.4 °F (36.3 °C) (Infrared)   Wt 257 lb (116.6 kg)   SpO2 99%   BMI 34.86 kg/m²   Weight: 257 lb (116.6 kg)   Constitutional: He is oriented to person, place, and time. He appears well-developed and well-nourished. No distress. HENT:   Head: Normocephalic and atraumatic.    Mouth/Throat: Uvula is midline, oropharynx is clear and moist and mucous membranes are normal.   Eyes: Conjunctivae and EOM are normal. Pupils are equal, round, and reactive to light. Neck: Trachea normal and normal range of motion. Neck supple. No JVD present. Carotid bruit is not present. No mass and no thyromegaly present. Cardiovascular: Normal rate, regular rhythm, normal heart sounds and intact distal pulses. Exam reveals no gallop and no friction rub. No murmur heard. Pulmonary/Chest: Effort normal and breath sounds normal. No respiratory distress. He has no wheezes. He has no rales. Abdominal: Soft. Normal aorta and bowel sounds are normal. He exhibits no distension and no mass. There is no hepatosplenomegaly. No tenderness. Musculoskeletal: He exhibits no edema, L hip poor ROM  Neurological: He is alert and oriented to person, place, and time. He has normal strength. No cranial nerve deficit or sensory deficit. Coordination and gait normal.   Skin: Skin is warm and dry. No rash noted. No erythema. EKG Interpretation:  normal EKG, normal sinus rhythm, unchanged from previous tracings. Lab Review Yes, orders placed for PT/ PT/INR     Assessment:       Mat Morris was seen today for pre-op exam and discuss labs. Diagnoses and all orders for this visit:    Arthritis of left hip    Preop examination  -     EKG 12 Lead  -     APTT; Future  -     Protime-INR; Future    Other orders  -     diclofenac sodium (VOLTAREN) 1 % GEL; Apply up to 4 times daily as needed for pain, #4 tubes, 5 RF      59 y.o. patient  approved for Surgery         Plan:     1. Preoperative workup as follows: PT/PTT/INR  2. Change in medication regimen before surgery: Hold all medications on morning of surgery  3. No contraindications to planned surgery    Electronically signed by DAVIS Sheriff NP on 7/13/22 at 7:37 AM EDT     This dictation was generated by voice recognition computer software. Although all attempts are made to edit the dictation for accuracy, there may be errors in the transcription that are not intended.

## 2022-07-15 ENCOUNTER — HOSPITAL ENCOUNTER (OUTPATIENT)
Age: 57
Discharge: HOME | End: 2022-07-15
Payer: COMMERCIAL

## 2022-07-15 DIAGNOSIS — Z01.818 PREOP EXAMINATION: ICD-10-CM

## 2022-07-15 LAB
APTT: 29.1 SEC (ref 23–34.3)
INR BLD: 1.12 (ref 0.87–1.14)
PROTHROMBIN TIME: 14.3 SEC (ref 11.7–14.5)

## 2022-07-15 PROCEDURE — 85730 THROMBOPLASTIN TIME PARTIAL: CPT

## 2022-07-15 PROCEDURE — 85610 PROTHROMBIN TIME: CPT

## 2022-07-15 PROCEDURE — 36415 COLL VENOUS BLD VENIPUNCTURE: CPT

## 2022-08-11 ENCOUNTER — OFFICE VISIT (OUTPATIENT)
Dept: FAMILY MEDICINE CLINIC | Age: 57
End: 2022-08-11
Payer: COMMERCIAL

## 2022-08-11 VITALS
HEART RATE: 79 BPM | SYSTOLIC BLOOD PRESSURE: 120 MMHG | TEMPERATURE: 97.2 F | BODY MASS INDEX: 34.58 KG/M2 | OXYGEN SATURATION: 98 % | DIASTOLIC BLOOD PRESSURE: 74 MMHG | WEIGHT: 255 LBS

## 2022-08-11 DIAGNOSIS — Z00.00 PREVENTATIVE HEALTH CARE: Primary | ICD-10-CM

## 2022-08-11 DIAGNOSIS — R35.1 FREQUENT URINATION AT NIGHT: ICD-10-CM

## 2022-08-11 PROCEDURE — 99396 PREV VISIT EST AGE 40-64: CPT | Performed by: NURSE PRACTITIONER

## 2022-08-11 RX ORDER — DOCUSATE SODIUM -SENNOSIDES 50; 8.6 MG/1; MG/1
TABLET, COATED ORAL
COMMUNITY
Start: 2022-07-18 | End: 2022-08-11 | Stop reason: ALTCHOICE

## 2022-08-11 RX ORDER — MELOXICAM 7.5 MG/1
TABLET ORAL
COMMUNITY
Start: 2022-08-04

## 2022-08-11 RX ORDER — OXYCODONE HYDROCHLORIDE 5 MG/1
TABLET ORAL
COMMUNITY
Start: 2022-08-04

## 2022-08-11 SDOH — ECONOMIC STABILITY: FOOD INSECURITY: WITHIN THE PAST 12 MONTHS, THE FOOD YOU BOUGHT JUST DIDN'T LAST AND YOU DIDN'T HAVE MONEY TO GET MORE.: NEVER TRUE

## 2022-08-11 SDOH — ECONOMIC STABILITY: FOOD INSECURITY: WITHIN THE PAST 12 MONTHS, YOU WORRIED THAT YOUR FOOD WOULD RUN OUT BEFORE YOU GOT MONEY TO BUY MORE.: NEVER TRUE

## 2022-08-11 ASSESSMENT — PATIENT HEALTH QUESTIONNAIRE - PHQ9
SUM OF ALL RESPONSES TO PHQ QUESTIONS 1-9: 0
SUM OF ALL RESPONSES TO PHQ QUESTIONS 1-9: 0
1. LITTLE INTEREST OR PLEASURE IN DOING THINGS: 0
2. FEELING DOWN, DEPRESSED OR HOPELESS: 0
SUM OF ALL RESPONSES TO PHQ QUESTIONS 1-9: 0
SUM OF ALL RESPONSES TO PHQ9 QUESTIONS 1 & 2: 0
SUM OF ALL RESPONSES TO PHQ QUESTIONS 1-9: 0

## 2022-08-11 ASSESSMENT — SOCIAL DETERMINANTS OF HEALTH (SDOH): HOW HARD IS IT FOR YOU TO PAY FOR THE VERY BASICS LIKE FOOD, HOUSING, MEDICAL CARE, AND HEATING?: NOT HARD AT ALL
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
## 2022-08-25 ENCOUNTER — PATIENT MESSAGE (OUTPATIENT)
Dept: FAMILY MEDICINE CLINIC | Age: 57
End: 2022-08-25

## 2022-08-26 RX ORDER — PANTOPRAZOLE SODIUM 40 MG/1
TABLET, DELAYED RELEASE ORAL
Qty: 90 TABLET | Refills: 3 | Status: SHIPPED | OUTPATIENT
Start: 2022-08-26

## 2022-08-26 NOTE — TELEPHONE ENCOUNTER
From: Shelly Garcia  To: Nydia Person  Sent: 8/25/2022 10:46 PM EDT  Subject: different pharmacy    Nava Gallego, it's time to refill my daily med for heart burn. Can you request a 90 day refill at [facility] (not sure of spelling?) instead of [facility] for this particular medicine? This is a mail service that my insurance prefer me using that ships a 90 day supply. I am hoping it will show up on my previous record with Melissa Thomas?   Thank you  Lew Zuniga

## 2022-11-13 ENCOUNTER — HOSPITAL ENCOUNTER (EMERGENCY)
Age: 57
Discharge: HOME | End: 2022-11-13
Attending: EMERGENCY MEDICINE
Payer: COMMERCIAL

## 2022-11-13 VITALS
WEIGHT: 250 LBS | RESPIRATION RATE: 19 BRPM | OXYGEN SATURATION: 98 % | DIASTOLIC BLOOD PRESSURE: 85 MMHG | HEART RATE: 76 BPM | SYSTOLIC BLOOD PRESSURE: 163 MMHG | TEMPERATURE: 97.9 F | BODY MASS INDEX: 33.91 KG/M2

## 2022-11-13 DIAGNOSIS — M79.672 LEFT FOOT PAIN: Primary | ICD-10-CM

## 2022-11-13 PROCEDURE — 99283 EMERGENCY DEPT VISIT LOW MDM: CPT

## 2022-11-13 RX ORDER — CAPSAICIN 0.025 %
CREAM (GRAM) TOPICAL
Qty: 120 G | Refills: 1 | Status: SHIPPED | OUTPATIENT
Start: 2022-11-13 | End: 2022-12-13

## 2022-11-13 RX ORDER — MORPHINE SULFATE 15 MG/1
15 TABLET ORAL EVERY 6 HOURS PRN
Qty: 12 TABLET | Refills: 0 | Status: SHIPPED | OUTPATIENT
Start: 2022-11-13 | End: 2022-11-16

## 2022-11-13 RX ORDER — PREDNISONE 20 MG/1
40 TABLET ORAL DAILY
Qty: 10 TABLET | Refills: 0 | Status: SHIPPED | OUTPATIENT
Start: 2022-11-13 | End: 2022-11-18

## 2022-11-13 ASSESSMENT — PAIN - FUNCTIONAL ASSESSMENT
PAIN_FUNCTIONAL_ASSESSMENT: 0-10
PAIN_FUNCTIONAL_ASSESSMENT: ACTIVITIES ARE NOT PREVENTED

## 2022-11-13 ASSESSMENT — PAIN DESCRIPTION - ONSET: ONSET: ON-GOING

## 2022-11-13 ASSESSMENT — PAIN DESCRIPTION - PAIN TYPE: TYPE: ACUTE PAIN

## 2022-11-13 ASSESSMENT — PAIN SCALES - GENERAL: PAINLEVEL_OUTOF10: 2

## 2022-11-13 ASSESSMENT — PAIN DESCRIPTION - DESCRIPTORS: DESCRIPTORS: BURNING

## 2022-11-13 ASSESSMENT — PAIN DESCRIPTION - FREQUENCY: FREQUENCY: INTERMITTENT

## 2022-11-13 ASSESSMENT — PAIN DESCRIPTION - LOCATION: LOCATION: FOOT

## 2022-11-13 ASSESSMENT — PAIN DESCRIPTION - ORIENTATION: ORIENTATION: LEFT

## 2022-11-14 ENCOUNTER — PATIENT MESSAGE (OUTPATIENT)
Dept: FAMILY MEDICINE CLINIC | Age: 57
End: 2022-11-14

## 2022-11-14 ENCOUNTER — HOSPITAL ENCOUNTER (OUTPATIENT)
Dept: VASCULAR LAB | Age: 57
Discharge: HOME | End: 2022-11-14
Payer: COMMERCIAL

## 2022-11-14 DIAGNOSIS — M77.41 METATARSALGIA OF RIGHT FOOT: Primary | ICD-10-CM

## 2022-11-14 DIAGNOSIS — M79.672 LEFT FOOT PAIN: ICD-10-CM

## 2022-11-14 PROCEDURE — 93970 EXTREMITY STUDY: CPT

## 2022-11-14 PROCEDURE — 93971 EXTREMITY STUDY: CPT

## 2022-11-14 ASSESSMENT — ENCOUNTER SYMPTOMS
VOMITING: 0
NAUSEA: 0
SHORTNESS OF BREATH: 0
DIARRHEA: 0
CHEST TIGHTNESS: 0
ABDOMINAL PAIN: 0

## 2022-11-14 NOTE — ED PROVIDER NOTES
I independently performed a history and physical on Rogerio Castro. I personally saw the patient and performed a substantive portion of the visit including all aspects of the medical decision making. Briefly, this is a 59 y.o. male here for left foot pain. Shooting/electric in character. Has been ongoing for a week and a half. He was able to see his orthopedist who performed an MRI which was unremarkable. They advised him to get an EMG. He has no pain in his thigh or calf. He has some foot swelling but this has been ongoing for many months and bilateral.  No chest pain or shortness of breath. No nausea or vomiting. No fevers. There is decreased sensation to light palpation on the sole of his left foot but less so the dorsal surface. No back pain    On exam,   General: Patient is in no acute distress  Skin: No cyanosis  HEENT: Moist mucous membranes  Heart: Regular rate, regular rhythm  Lung: No respiratory distress  Abdomen: Soft, nontender  Neuro: Moving all extremities, no facial droop, no slurred speech, answers questions appropriately  5 out of 5 strength bilateral hip, knee, ankle, big toe flexors/extensors. Decree sensation light palpation over sole of the foot left side          Screenings            MDM  Briefly, this is a 59 y.o. male here for left foot pain. Pain sounds neuropathic to me. Already started on gabapentin at a low dose of 300 mg/day. Advised him to increase it slightly. He was already started on a prednisone course. We will extend this by 5 days. Great capillary refill on the toes with a nice warm foot, I doubt that this is peripheral arterial disease. No calf or thigh pain or swelling to indicate DVT. His wife was concerned about this therefore I ordered ultrasound study for it as an outpatient. Given my lower pretest probability, I do not believe that empiric anticoagulation risk is worth benefit. He already has EMG ordered by his doctor.   They did recommend that he get a lumbar spine MRI. Unfortunately, at this time of the day I would not be able to order this. He has no signs of emergent cord compressive pathology and no back pain therefore I feel the probability of lumbar radiculopathy to be lower as the cause of his symptoms. We will try some capsaicin cream.  Pain is under control when he is in certain positions. I do not see any sources of infection. I do not think that this is new heart failure. He has no chest pain or shortness of breath and his foot swelling has been ongoing for months. Discharged with all questions answered and return precautions given. No trauma to indicate need for x-rays and furthermore he already got MRI of the left foot by his orthopedist.    Is this patient to be included in the SEP-1 Core Measure due to severe sepsis or septic shock? No   Exclusion criteria - the patient is NOT to be included for SEP-1 Core Measure due to: Infection is not suspected           Patient Referrals:  DAVIS Morris - NP  [address]  [phone]    Call in 1 day        Discharge Medications:  Discharge Medication List as of 11/13/2022  7:24 PM        START taking these medications    Details   capsaicin (ZOSTRIX) 0.025 % cream Apply topically 2 times daily. , Disp-120 g, R-1, Print      morphine (MSIR) 15 MG tablet Take 1 tablet by mouth every 6 hours as needed for Pain for up to 3 days. , Disp-12 tablet, R-0Print      predniSONE (DELTASONE) 20 MG tablet Take 2 tablets by mouth daily for 5 days, Disp-10 tablet, R-0Print             FINAL IMPRESSION  1. Left foot pain        Blood pressure (!) 163/85, pulse 76, temperature 97.9 °F (36.6 °C), resp. rate 19, weight 250 lb (113.4 kg), SpO2 98 %. For further details of Marisol Garcia's emergency department encounter, please see documentation by advanced practice provider, Ellen Figueroa.         Phil Abebe MD  11/13/22 [id number]

## 2022-11-14 NOTE — TELEPHONE ENCOUNTER
From: Ino Garcia  To: Gissel Cancer  Sent: 11/14/2022 9:46 AM EST  Subject: MRI test    Anel Jaffe,I have been struggling with Peripheral Neuropathy. 2 weeks ago Dr Willow Mitchell at [facility] took an MRI of ankle and thought it was \"Ankle Arthritis\" but symptoms worsened in one week and now he thinks I have nerve damage in my lower back. My left foot is swollen a lot and numb on the bottom. Sometimes its shooting needles but turns into excruciating burning pain. I can't put any pressure on my L foot and have not been able to sleep in my bed for 10 days bec the pressure on foot wherever the foot touches my mattress is excruciating! I have to sleep sitting up on couch bec sitting is the only position I can tolerate. I missed work all week last week and Dr Eliana Morley put me on Gabapentin and steroids for one week. That hasn't helped. I went to [place] [id number] last night and the ER  gave me 3 days worth of morphine pills which has been the only way I can tolerate the pain! . Dr Eliana Morley wants an EMG. I am hoping UC can get me in later this week . He also wants an MRI but I cannot lay   on my back. I had to cancel MRI appt last Fri bec the burning pain was too bad at home anastacia I can't lay on back due to foot pain. ARYA Head told me to ask you to schedule an anethesiologist for my back MRI. ..can you please help with this? ARYA Grimm also told me to ask you for a referral to a pain management specialist so I can get the pain medicine I need to function (I only have 3 days worth). ARYA Grimm gave a script for an ultrasound to rule out blood clot but doubts that's the issue. Can you do this?
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
## 2022-11-14 NOTE — DISCHARGE INSTRUCTIONS
Please return if you have any new, worsening, or concerning symptoms like inability to eat/drink, fevers, weakness, chest pain, trouble breathing    Contact your primary care physician tomorrow to make a follow up appointment this week. Talk to your doctor about coordinating an MRI with an anesthesiologist/radiologist for your back. You should receive a phone call soon to schedule a ultrasound study to make sure there are no blood clots in your legs. Talk about increasing your gabapentin dose slowly.

## 2022-11-14 NOTE — ED NOTES
Discharge instructions given, patient acknowledged understanding, rx given x3 and outpt order, patient was taken to waiting room in wheelchair      Андрей Shelter, RN  11/13/22 9529

## 2022-11-15 DIAGNOSIS — M77.41 METATARSALGIA OF RIGHT FOOT: Primary | ICD-10-CM

## 2022-11-16 ENCOUNTER — OFFICE VISIT (OUTPATIENT)
Dept: ORTHOPEDIC SURGERY | Age: 57
End: 2022-11-16
Payer: COMMERCIAL

## 2022-11-16 VITALS — HEIGHT: 72 IN | WEIGHT: 250 LBS | BODY MASS INDEX: 33.86 KG/M2

## 2022-11-16 DIAGNOSIS — M79.672 LEFT FOOT PAIN: Primary | ICD-10-CM

## 2022-11-16 PROCEDURE — 99214 OFFICE O/P EST MOD 30 MIN: CPT | Performed by: ORTHOPAEDIC SURGERY
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
## 2022-11-17 ENCOUNTER — OFFICE VISIT (OUTPATIENT)
Dept: FAMILY MEDICINE CLINIC | Age: 57
End: 2022-11-17
Payer: COMMERCIAL

## 2022-11-17 VITALS — HEART RATE: 84 BPM | OXYGEN SATURATION: 98 % | WEIGHT: 262 LBS | BODY MASS INDEX: 35.53 KG/M2 | TEMPERATURE: 98 F

## 2022-11-17 DIAGNOSIS — R60.0 BILATERAL LOWER EXTREMITY EDEMA: Primary | ICD-10-CM

## 2022-11-17 PROCEDURE — 99213 OFFICE O/P EST LOW 20 MIN: CPT | Performed by: NURSE PRACTITIONER

## 2022-11-17 RX ORDER — MELOXICAM 15 MG/1
TABLET ORAL
COMMUNITY
Start: 2022-11-03

## 2022-11-17 RX ORDER — TRAMADOL HYDROCHLORIDE 50 MG/1
50 TABLET ORAL EVERY 6 HOURS PRN
Qty: 28 TABLET | Refills: 0 | Status: SHIPPED | OUTPATIENT
Start: 2022-11-17 | End: 2022-11-24

## 2022-11-17 RX ORDER — GABAPENTIN 100 MG/1
CAPSULE ORAL
COMMUNITY
Start: 2022-11-08

## 2022-11-17 RX ORDER — FUROSEMIDE 40 MG/1
40 TABLET ORAL DAILY
Qty: 30 TABLET | Refills: 0 | Status: SHIPPED | OUTPATIENT
Start: 2022-11-17
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
## 2022-12-01 ENCOUNTER — OFFICE VISIT (OUTPATIENT)
Dept: VASCULAR SURGERY | Age: 57
End: 2022-12-01
Payer: COMMERCIAL

## 2022-12-01 VITALS — WEIGHT: 258 LBS | HEIGHT: 72 IN | BODY MASS INDEX: 34.95 KG/M2

## 2022-12-01 DIAGNOSIS — R60.0 BILATERAL LOWER EXTREMITY EDEMA: Primary | ICD-10-CM

## 2022-12-01 PROCEDURE — 99203 OFFICE O/P NEW LOW 30 MIN: CPT | Performed by: STUDENT IN AN ORGANIZED HEALTH CARE EDUCATION/TRAINING PROGRAM

## 2022-12-01 NOTE — PROGRESS NOTES
Rogerio Castro (:  1965) is a 59 y.o. male,New patient, here for evaluation of the following chief complaint(s):  Leg Swelling         ASSESSMENT/PLAN:  1. Bilateral lower extremity edema  -     VL Extremity Venous Bilateral; Future    This is a 59year old male patient who presents for edema evaluation with pain. He does have significant pain that is occurring gradually throughout the day suggestive of venous reflux as the primary pathology. Given the questionable relief with diuretics, agree with Cardiology consultation as well. For now a prescription for stockings was given, a reflux study ordered and will follow up on those results. All questions answered. Subjective   SUBJECTIVE/OBJECTIVE:  This is a 59 year old male patient who presents for evaluation of bilateral lower extremity edema for about 6 weeks. He underwent venous duplex which was negative for DVT. He denies any new antecedent events, I.e. travel, trauma to warrant this new phenomenon. He has experienced relief with compression garments that are old and forced diuresis. No chest pain or shortness of breath. He states his leg discomfort was associated with nightly calf and foot pain, heaviness and generalized soreness that has prevented him from accomplishing his daily activities. His mother had varicose veins that were surgically treated. He still works as a  but mostly sits rather than standing. No significant weight gain. Objective   Physical Exam  Constitutional:       Appearance: Normal appearance. Cardiovascular:      Rate and Rhythm: Normal rate and regular rhythm. Pulses: Normal pulses. Pulmonary:      Effort: Pulmonary effort is normal. No respiratory distress. Musculoskeletal:         General: No tenderness. Normal range of motion. Right lower leg: No edema. Left lower leg: No edema. Skin:     General: Skin is warm and dry.       Comments: Small clusters of varicose veins that are barely prominent at this time bilaterally over anterior lower legs   Neurological:      Mental Status: He is alert.           Morelia Gong DO, ROBYN, FSVS, [address] [facility] Vascular and Endovascular Surgery

## 2022-12-01 NOTE — Clinical Note
Thank you very much for referring this patient. Please see my note for the assessment and plan.   Sincerely,  Delta Can, DO, FACS, FSVS, RPVI

## 2022-12-07 DIAGNOSIS — R60.0 BILATERAL LOWER EXTREMITY EDEMA: ICD-10-CM

## 2022-12-07 PROBLEM — E66.9 OBESITY: Status: ACTIVE | Noted: 2022-12-07

## 2022-12-07 RX ORDER — FUROSEMIDE 40 MG/1
40 TABLET ORAL DAILY
Qty: 30 TABLET | Refills: 0 | Status: SHIPPED | OUTPATIENT
Start: 2022-12-07

## 2022-12-07 NOTE — PROGRESS NOTES
[address]  12/22/22  Referring: Dr. Steve Castillo CONSULT/CHIEF COMPLAINT/HPI     Reason for visit/ Chief complaint  New Patient  LE swelling   HPI Mireille Kelly is a 59 y. o.who's history includes acid reflux, arthritis, galdamez's esophagus, TKR (2018), sleep apnea. Lissa Martinez has had L ankle pain for several weeks. He was seen at [facility] on 11/13/22, describes pain as burning and shooting with radiation. Reports seeing ortho and had MRI completed, which showed showed, moderate tibiotalar arthritis. Chronic grade 3 ATFL tear. Posterior tibial tendinosis. Os trigonum. No fracture and an EMG completed 11/15/22 was essentially a normal study. Doppler ultrasound of the bilateral lower extremity dated 11/14/2022 showed no evidence of DVT. Pt was seen by ortho (Dr Mary Rick) 11/16/22 with bilateral LE edema and had a > 12 pound weight gain. This edema is worse on left side and pitting. Today he is here as a new patient for bilateral LE edema with pain, weight gain. Also, he notices \"a little\" sob when walking up hills with his dog. About 3-4 mos ago, he developed swelling and pain and numbness to bottom of left foot/ankle. These symptoms started suddenly except for the numbness, which has been present for a longer time. No known injuries, his swelling is worse at night. He had some swelling in right side as well, but not as severe as left. He has h/o of total knee and hip replacement. Pressure stockings have helped his swelling and so did ice application. Chary did an MRI of his back and ankle. He went to ER for these symptoms and per pt, was negative for \"blood clot\" but has been told he has \"varicose veins\" and was recommended a vascular doctor. He was put on prednisone when seen in ER. He also takes Aleve prn and gabapentin. Denies h/o diabetes, he does smoke marajuana, drinks about 10 beers per week. Allergy to red dye and excedrin.  He has family h/o heart disease on his father's side. About an hour a day he hikes in woods, 5 days per week. If he hikes quickly, he gets out of breath. Pt denies exertional chest pain, PND, palpitations, light-headedness, edema. He has shoulder pain but relates it to using his hiking cane. He feels a little cornejo than he used to after he eats. He states he snores \"tremendously,\" he used a cpap about 20 years ago. Today, he states his edema is [place] lot better\" since the day he made his doctor apt. He's been taking lasix. He likes to eat milk and cookies before bed, other than that he reports diet is good. Patient is adherent with medications and is tolerating them well without side effects     HISTORY/ALLERGIES/ROS     MedHx:  has a past medical history of Acid reflux, Anesthesia complication, Arthritis, Good's esophagus, H/O total knee replacement, and Sleep apnea. SurgHx:  has a past surgical history that includes Elbow surgery; other surgical history; Knee arthroscopy (Bilateral); Colonoscopy; Carpal tunnel release (Left, 10/07/2014); Ulnar tunnel release (Left, 10/07/2014); Carpal tunnel release (Right, 11/25/2014); Ulnar tunnel release (Right, 11/25/2014); Umbilical hernia repair (06/25/2015); Upper gastrointestinal endoscopy; Upper gastrointestinal endoscopy (11/16/2017); Anterior cruciate ligament repair (1991); other surgical history; pr incise finger tendon sheath (Left, 10/18/2018); Foot surgery (Left); Total knee arthroplasty (Left, 12/17/2019); joint replacement (Bilateral); Colonoscopy (N/A, 03/29/2022); and Total hip arthroplasty (07/2022). SocHx:  reports that he has never smoked. He has never used smokeless tobacco. He reports current alcohol use of about 6.0 standard drinks per week. He reports current drug use. Drug: Marijuana Chemult Torres).    FamHx: No family history of premature coronary artery disease, sudden death, or aneurysm  Allergies: Aspirin-acetaminophen-caffeine, Excedrin extra strength [aspirin-acetaminophen-caffeine], Other, and Red dye   ROS:   Review of Systems   Respiratory:  Positive for shortness of breath. Cardiovascular:  Positive for leg swelling. Musculoskeletal:  Positive for arthralgias and joint swelling. MEDICATIONS      Prior to Admission medications    Medication Sig Start Date End Date Taking? Authorizing Provider   diclofenac sodium (VOLTAREN) 1 % GEL Apply up to 4 times daily as needed for pain, #4 tubes, 5 RF 12/7/22  Yes Raymond Redder, APRN - NP   furosemide (LASIX) 40 MG tablet Take 1 tablet by mouth daily 12/7/22  Yes Raymond Bayder, APRN - NP   gabapentin (NEURONTIN) 100 MG capsule Pt takes 1200mg daily 11/8/22  Yes Historical Provider, MD   pantoprazole (PROTONIX) 40 MG tablet TAKE 1 TABLET DAILY 8/26/22  Yes DAVIS Mac NP   Menthol, Topical Analgesic, (BIOFREEZE) 4 % GEL Apply topically   Yes Historical Provider, MD   meloxicam (MOBIC) 15 MG tablet TAKE 1 TABLET BY MOUTH EVERY DAY  Patient not taking: No sig reported 11/3/22   Historical Provider, MD       PHYSICAL EXAM        Vitals:    12/22/22 0826   BP: (!) 126/92   Pulse:    SpO2:     Weight: 257 lb 12.8 oz (116.9 kg)     Gen Alert, cooperative, no distress Heart  Regular rate and rhythm, no murmur   Head Normocephalic, atraumatic, no abnormalities Abd  Soft, NT, +BS, no mass, no OM   Eyes PERRLA, conj/corn clear Ext  Ext nl, AT, no C/C, NO EDEMA. Knee replacements   Nose Nares normal, no drain age, Non-tender Pulse 2+ and symmetric   Throat Lips, mucosa, tongue normal Skin Color/text/turg nl, no rash/lesions   Neck S/S, TM, NT, no bruit Psych Nl mood and affect   Lung CTA-B, unlabored, no DTP     Ch wall NT, no deform       LABS and Imaging     Relevant and available CV data reviewed  Echo/MRI:    Cath:   Holter:  EKG personally interpreted: 7/13/22 stable  Stress:   Moderate Risk  Moderate Complexity/Medical Decision Making  Outside/Care everywhere records Reviewed  Labs Reviewed 7/8/22 TC 166  HDL 42   Prior Imaging, ekg, cath, echo reviewed when available  Medications reviewed  Old Notes reviewed  ASSESSMENT AND PLAN     1.bilateral lower extremity edema, most likely venous insufficiency   - new problem  Doppler ultrasound of the bilateral lower extremity dated 11/14/2022 showed no evidence of DVT. - most likely multifactorial from venous insufficiency, replacements/hip surgery, meloxicam/steroids/gabapentin, all of which cause edema. It is possible that he may have some diastolic dysfunction and RV dysfunction from untreated sleep apnea as well. Plan:   Follow up vascular  Continue on lasix   Continue with stockings  Counciled on sodium intake  Echocardiogram  Obtain labs thyroid, bnp, cmp, urine protein/creatinine to evaluate for additional causes of edema      2.shortness of breath  - new problem  Plan:  - echo    3. YOUSIF  - new problem  Plan:   Need updated sleep study    Patient counseled on lifestyle modification, diet, and exercise. Follow Up: 3 months    Dr. Claudell Ferrara    Physician Attestation  The scribe for and in the presence of korey Mccain DO). The scribe Gwinda Curling, Rn may have prepopulated components of this note with my historical  intellectual property under my direct supervision. Any additions to this intellectual property were performed in my presence and at my direction. Furthermore, the content and accuracy of this note have been reviewed by korey Mccain DO).   12/22/2022 10:11 AM

## 2022-12-12 ENCOUNTER — PROCEDURE VISIT (OUTPATIENT)
Dept: VASCULAR SURGERY | Age: 57
End: 2022-12-12

## 2022-12-12 DIAGNOSIS — R60.0 BILATERAL LOWER EXTREMITY EDEMA: ICD-10-CM

## 2022-12-13 ENCOUNTER — TELEPHONE (OUTPATIENT)
Dept: SURGERY | Age: 57
End: 2022-12-13

## 2022-12-19 ENCOUNTER — OFFICE VISIT (OUTPATIENT)
Dept: VASCULAR SURGERY | Age: 57
End: 2022-12-19
Payer: COMMERCIAL

## 2022-12-19 VITALS — BODY MASS INDEX: 34.99 KG/M2 | HEIGHT: 72 IN

## 2022-12-19 DIAGNOSIS — I87.2 VENOUS INSUFFICIENCY: Primary | ICD-10-CM

## 2022-12-19 PROCEDURE — 99213 OFFICE O/P EST LOW 20 MIN: CPT | Performed by: STUDENT IN AN ORGANIZED HEALTH CARE EDUCATION/TRAINING PROGRAM

## 2022-12-19 NOTE — PROGRESS NOTES
Holly Montero (:  1965) is a 59 y.o. male,Established patient, here for evaluation of the following chief complaint(s):  Results         ASSESSMENT/PLAN:  1. Venous insufficiency    This is a 59year old male patient who presents for evaluation of venous insufficiency. His reflux scan does not suggest urgent need for treatment. Given the deep and superficial reflux in varying segments and his relief with initial stocking therapy, would recommend continued conservative management for now. Recommend 6 months of therapy including compression, elevation, exercise and searching for other reasons for his discomfort going forward. Defer to primary and Cardiology for diuretic management. All questions answered. May follow up in 6 months if not obtaining relief and having increased discomfort associated with venous pathology. Subjective   SUBJECTIVE/OBJECTIVE:  This is a 59year old male patient who presents to the office today to discuss his venous insufficiency. He is using compression and already feels better. He is also evaluated his left foot pain for other sources including neurogenic and orthotic related causes. He does state that he is able to elevate his legs at night. He is walking better. He is planning to see a Cardiologist to evaluate for central pathology. No other concerns at this time. Venous reflux testing complete and personally reviewed showing axial deep venous reflux bilaterally with isolated superficial venous thrombosis in the GSV of the left thigh and right calf. Objective   Physical Exam  Constitutional:       Appearance: Normal appearance. Cardiovascular:      Rate and Rhythm: Normal rate and regular rhythm. Pulses: Normal pulses. Pulmonary:      Effort: Pulmonary effort is normal. No respiratory distress. Musculoskeletal:         General: No tenderness. Normal range of motion. Right lower leg: No edema. Left lower leg: No edema.    Skin:     General: Skin is warm and dry. Comments: Small clusters of varicose veins that are barely prominent at this time bilaterally over anterior lower legs   Neurological:      Mental Status: He is alert. On this date 12/19/2022 I have spent 25 minutes reviewing previous notes, test results and face to face with the patient discussing the diagnosis and importance of compliance with the treatment plan as well as documenting on the day of the visit.     Eden Loyd DO, FACS, FSVS, [facility]

## 2022-12-22 ENCOUNTER — HOSPITAL ENCOUNTER (OUTPATIENT)
Age: 57
Discharge: HOME | End: 2022-12-22
Payer: COMMERCIAL

## 2022-12-22 ENCOUNTER — OFFICE VISIT (OUTPATIENT)
Dept: CARDIOLOGY CLINIC | Age: 57
End: 2022-12-22

## 2022-12-22 VITALS
SYSTOLIC BLOOD PRESSURE: 126 MMHG | HEART RATE: 71 BPM | WEIGHT: 257.8 LBS | BODY MASS INDEX: 34.92 KG/M2 | OXYGEN SATURATION: 98 % | DIASTOLIC BLOOD PRESSURE: 92 MMHG | HEIGHT: 72 IN

## 2022-12-22 DIAGNOSIS — R60.0 BILATERAL LEG EDEMA: ICD-10-CM

## 2022-12-22 DIAGNOSIS — G47.33 OSA (OBSTRUCTIVE SLEEP APNEA): ICD-10-CM

## 2022-12-22 DIAGNOSIS — G47.33 OSA (OBSTRUCTIVE SLEEP APNEA): Primary | ICD-10-CM

## 2022-12-22 DIAGNOSIS — R06.02 SHORTNESS OF BREATH: ICD-10-CM

## 2022-12-22 DIAGNOSIS — E66.9 OBESITY, UNSPECIFIED CLASSIFICATION, UNSPECIFIED OBESITY TYPE, UNSPECIFIED WHETHER SERIOUS COMORBIDITY PRESENT: ICD-10-CM

## 2022-12-22 LAB
A/G RATIO: 1.5 (ref 1.1–2.2)
ALBUMIN SERPL-MCNC: 4.3 G/DL (ref 3.4–5)
ALP BLD-CCNC: 108 U/L (ref 40–129)
ALT SERPL-CCNC: 25 U/L (ref 10–40)
ANION GAP SERPL CALCULATED.3IONS-SCNC: 15 MMOL/L (ref 3–16)
AST SERPL-CCNC: 22 U/L (ref 15–37)
BILIRUB SERPL-MCNC: 0.3 MG/DL (ref 0–1)
BUN BLDV-MCNC: 18 MG/DL (ref 7–20)
CALCIUM SERPL-MCNC: 9.5 MG/DL (ref 8.3–10.6)
CHLORIDE BLD-SCNC: 102 MMOL/L (ref 99–110)
CO2: 25 MMOL/L (ref 21–32)
CREAT SERPL-MCNC: 0.8 MG/DL (ref 0.9–1.3)
CREATININE URINE: 34.4 MG/DL (ref 39–259)
GFR SERPL CREATININE-BSD FRML MDRD: >60 ML/MIN/{1.73_M2}
GLUCOSE BLD-MCNC: 92 MG/DL (ref 70–99)
POTASSIUM SERPL-SCNC: 4 MMOL/L (ref 3.5–5.1)
PRO-BNP: 48 PG/ML (ref 0–124)
PROTEIN PROTEIN: <4 MG/DL
PROTEIN/CREAT RATIO: ABNORMAL MG/DL
SODIUM BLD-SCNC: 142 MMOL/L (ref 136–145)
T4 FREE: 1.3 NG/DL (ref 0.9–1.8)
TOTAL PROTEIN: 7.1 G/DL (ref 6.4–8.2)
TSH SERPL DL<=0.05 MIU/L-ACNC: 1.36 UIU/ML (ref 0.27–4.2)

## 2022-12-22 PROCEDURE — 84443 ASSAY THYROID STIM HORMONE: CPT

## 2022-12-22 PROCEDURE — 84439 ASSAY OF FREE THYROXINE: CPT

## 2022-12-22 PROCEDURE — 82570 ASSAY OF URINE CREATININE: CPT

## 2022-12-22 PROCEDURE — 84156 ASSAY OF PROTEIN URINE: CPT

## 2022-12-22 PROCEDURE — 36415 COLL VENOUS BLD VENIPUNCTURE: CPT

## 2022-12-22 PROCEDURE — 80053 COMPREHEN METABOLIC PANEL: CPT

## 2022-12-22 PROCEDURE — 83880 ASSAY OF NATRIURETIC PEPTIDE: CPT

## 2022-12-22 NOTE — Clinical Note
Thanks for sending over asia. Most likely multifactorial leg swelling from venous insufficiency, replacements/hip surgery, meloxicam/steroids/gabapentin, all of which cause edema. It is possible that he may have some diastolic dysfunction and RV dysfunction from untreated sleep apnea as well. Will get an echo and sleep study. Thanks!  shilpa

## 2022-12-22 NOTE — PATIENT INSTRUCTIONS
Echocardiogram   Labs soon  Need updated sleep study, Dr Menon Courts  Recommend weight loss  Follow up 3 mos

## 2022-12-23 ENCOUNTER — PATIENT MESSAGE (OUTPATIENT)
Dept: FAMILY MEDICINE CLINIC | Age: 57
End: 2022-12-23

## 2022-12-23 NOTE — TELEPHONE ENCOUNTER
From: Kev Garcia  To: Denisha Agudelo  Sent: 12/23/2022 1:57 PM EST  Subject: gabapentin    Can you please renew my Rx for gabapentin? I have been taking double what was originally prescribed as the initial low dosage was not enough. I have been taking 400 mg, 3x per day.   Thank you

## 2022-12-24 ASSESSMENT — ENCOUNTER SYMPTOMS: SHORTNESS OF BREATH: 1

## 2022-12-29 ENCOUNTER — PROCEDURE VISIT (OUTPATIENT)
Dept: CARDIOLOGY CLINIC | Age: 57
End: 2022-12-29

## 2022-12-29 DIAGNOSIS — R60.0 BILATERAL LEG EDEMA: ICD-10-CM

## 2022-12-29 DIAGNOSIS — G47.33 OSA (OBSTRUCTIVE SLEEP APNEA): ICD-10-CM

## 2023-01-04 ENCOUNTER — TELEPHONE (OUTPATIENT)
Dept: CARDIOLOGY CLINIC | Age: 58
End: 2023-01-04

## 2023-01-04 NOTE — TELEPHONE ENCOUNTER
Pt called back and I relayed the message per dkw. Pt v/u. Pt also wanted to know his echocardiogram results. Please see and advise  Thank you.

## 2023-01-04 NOTE — TELEPHONE ENCOUNTER
----- Message from Luis Rowland DO sent at 1/4/2023  7:51 AM EST -----  Please let patient know labs are normal

## 2023-01-06 ENCOUNTER — TELEPHONE (OUTPATIENT)
Dept: CARDIOLOGY CLINIC | Age: 58
End: 2023-01-06

## 2023-01-06 NOTE — TELEPHONE ENCOUNTER
Spoke to pt, relayed echo result per dkw. Pt v/u. Pt was concerned for his heart. He stated is there any cardio exercise he can do or is there anything he needs to stop doing and does he need to lose more weight? Maycol Gee, DO   1/5/2023 10:15 PM EST Back to Top      Please let Jacky Triston know that his echo looked okay. He did have one heart valve that is mildly-moderately leaky. No need for surgery or procedures at this time. We will follow up how he is doing clinically and with a repeat echo in 2 years. We will discuss this extensively at our next visit.

## 2023-01-06 NOTE — TELEPHONE ENCOUNTER
Please see Dr Reagan Aschoff message on his Echo and call to relay to patient per Dr Chica Hansen. Thank you!

## 2023-01-06 NOTE — TELEPHONE ENCOUNTER
Spoke to pt regarding ECHO results. Pt wanted to know if there were any activities he should or shouldn't be doing till he comes back for his FU in March? Please advise.

## 2023-01-06 NOTE — RESULT ENCOUNTER NOTE
Please let Mena Stanton know that his echo looked okay. He did have one heart valve that is mildly-moderately leaky. No need for surgery or procedures at this time. We will follow up how he is doing clinically and with a repeat echo in 2 years. We will discuss this extensively at our next visit.

## 2023-01-06 NOTE — TELEPHONE ENCOUNTER
----- Message from Yassine Metz DO sent at 1/5/2023 10:15 PM EST -----  Please let Benedict Prudence know that his echo looked okay. He did have one heart valve that is mildly-moderately leaky. No need for surgery or procedures at this time. We will follow up how he is doing clinically and with a repeat echo in 2 years. We will discuss this extensively at our next visit.

## 2023-01-06 NOTE — TELEPHONE ENCOUNTER
Per DKW, pt has no limitations. He is able to ride his bike, go hiking without concern. Pt should follow a healthy diet, work on weight loss and exercise will help this. Called patient and spoke to him to relay the above message. Pt verbalized understanding and is agreeable to plan. No further questions at this time.

## 2023-01-10 DIAGNOSIS — R60.0 BILATERAL LOWER EXTREMITY EDEMA: ICD-10-CM

## 2023-01-10 RX ORDER — FUROSEMIDE 40 MG/1
TABLET ORAL
Qty: 30 TABLET | Refills: 0 | Status: SHIPPED | OUTPATIENT
Start: 2023-01-10 | End: 2023-01-10 | Stop reason: SDUPTHER

## 2023-01-11 DIAGNOSIS — R60.0 BILATERAL LOWER EXTREMITY EDEMA: ICD-10-CM

## 2023-01-11 RX ORDER — FUROSEMIDE 40 MG/1
TABLET ORAL
Qty: 90 TABLET | Refills: 1 | Status: SHIPPED | OUTPATIENT
Start: 2023-01-11

## 2023-02-21 DIAGNOSIS — R60.0 BILATERAL LOWER EXTREMITY EDEMA: ICD-10-CM

## 2023-02-21 RX ORDER — FUROSEMIDE 40 MG/1
TABLET ORAL
Qty: 90 TABLET | Refills: 1 | Status: SHIPPED | OUTPATIENT
Start: 2023-02-21

## 2023-02-22 ENCOUNTER — TELEPHONE (OUTPATIENT)
Dept: SLEEP CENTER | Age: 58
End: 2023-02-22

## 2023-02-22 ENCOUNTER — OFFICE VISIT (OUTPATIENT)
Dept: PULMONOLOGY | Age: 58
End: 2023-02-22
Payer: COMMERCIAL

## 2023-02-22 VITALS
WEIGHT: 257 LBS | HEIGHT: 72 IN | OXYGEN SATURATION: 94 % | HEART RATE: 71 BPM | DIASTOLIC BLOOD PRESSURE: 87 MMHG | SYSTOLIC BLOOD PRESSURE: 141 MMHG | BODY MASS INDEX: 34.81 KG/M2

## 2023-02-22 DIAGNOSIS — R03.0 ELEVATED BP WITHOUT DIAGNOSIS OF HYPERTENSION: ICD-10-CM

## 2023-02-22 DIAGNOSIS — G47.10 HYPERSOMNIA: Primary | ICD-10-CM

## 2023-02-22 DIAGNOSIS — G47.33 OSA (OBSTRUCTIVE SLEEP APNEA): ICD-10-CM

## 2023-02-22 DIAGNOSIS — E66.9 OBESITY (BMI 30-39.9): ICD-10-CM

## 2023-02-22 PROCEDURE — 99204 OFFICE O/P NEW MOD 45 MIN: CPT | Performed by: STUDENT IN AN ORGANIZED HEALTH CARE EDUCATION/TRAINING PROGRAM

## 2023-02-22 ASSESSMENT — SLEEP AND FATIGUE QUESTIONNAIRES
HOW LIKELY ARE YOU TO NOD OFF OR FALL ASLEEP WHILE SITTING AND TALKING TO SOMEONE: 0
HOW LIKELY ARE YOU TO NOD OFF OR FALL ASLEEP WHILE SITTING QUIETLY AFTER LUNCH WITHOUT ALCOHOL: 2
HOW LIKELY ARE YOU TO NOD OFF OR FALL ASLEEP WHILE SITTING AND READING: 3
HOW LIKELY ARE YOU TO NOD OFF OR FALL ASLEEP WHILE LYING DOWN TO REST IN THE AFTERNOON WHEN CIRCUMSTANCES PERMIT: 2
ESS TOTAL SCORE: 8
HOW LIKELY ARE YOU TO NOD OFF OR FALL ASLEEP WHILE SITTING INACTIVE IN A PUBLIC PLACE: 0
HOW LIKELY ARE YOU TO NOD OFF OR FALL ASLEEP WHEN YOU ARE A PASSENGER IN A CAR FOR AN HOUR WITHOUT A BREAK: 0
HOW LIKELY ARE YOU TO NOD OFF OR FALL ASLEEP WHILE WATCHING TV: 1
HOW LIKELY ARE YOU TO NOD OFF OR FALL ASLEEP IN A CAR, WHILE STOPPED FOR A FEW MINUTES IN TRAFFIC: 0

## 2023-02-22 NOTE — TELEPHONE ENCOUNTER
Called to schedule an hst per Ad Mandel vm for the pt to return my call     [facility] insurance

## 2023-02-22 NOTE — PATIENT INSTRUCTIONS
Patient information on the evaluation procedure for sleep apnea:    1. You are going to have a portable sleep study: This is a home sleep study that will be done to see if you have obstructive sleep apnea. You will have a flow monitor on your nose, belt on your chest and a oxygen/heart rate monitor on your finger. Please do not wear nail polish on day of the study as it will interfere with the oxygen reading probe that is placed on your finger during the study. Once you receive the device, you will also receive instructions on how to put it on and turn it on. After 2 nights you will return it. 2. The sleep office will call you with the results a week after the study. If the study showed that you have obstructive sleep apnea you will be told of the next step in your care. Commonly the recommended treatment is CPAP Therapy. If you agree to this therapy and you receive your CPAP before your next appointment, please bring it with you to your first follow-up appointment. Patients who have obstructive sleep apnea on the sleep study and have chosen to try CPAP:  A home equipment company will contact you to set you up with a CPAP machine and fit you for a mask. Please bring your CPAP, the mask and all supplies including the electrical cord with you to all your first follow up appointments with the sleep physician    Please keep trying to use your CPAP until you have seen in the sleep office in follow up as a lot of the problems patients have with CPAP can be addressed and corrected by your sleep provider. Sleep center contact information:  [facility]: [phone]  [facility]: [phone]             What are the risk factors for Obstructive Sleep Apnea (YOUSIF)? Obesity  Snoring  Daytime sleepiness  Increasing age  Male gender  Taking sedating medications  Alcohol use  Hypertension  Stroke  Diabetes  Smoking     What is YOUSIF?   People with YOUSIF experience recurrent episodes during sleep when their throat closes or significantly narrows and they cannot inhale air into their lungs. This happens because the muscles that normally hold the throat open during wakefulness relax during sleep and allow it to narrow. When the muscles relax too much, trying to inhale can cause the throat to completely close and air cannot pass at all for at least 10 seconds. This is an obstructive apnea. An obstructive hypopnea occurs when the throat is partially closed for at least 10 seconds and airflow is decreased so much that oxygen in the blood starts to be fall. YOUSIF is measured by how many apneas and hypopneas we have per hour. This is called an Apnea Hypopnea Index (AHI). It is considered excessive to have more than 5 apneas or hypopneas per hour as this can be extremely disruptive to sleep and have significant effects on our health and well being. How does YOUSIF affect me? YOUSIF goes beyond affecting just our quality of sleep. It interferes with many other systems of our body. Increase in blood pressure  Worsened control of diabetes  Daytime sleepiness and fatigue  Irritability  Difficulty concentrating or remembering facts  Difficulty losing weight  Swelling in the legs  Frequent awakenings to urinate  Increased risk of stroke  Increased risk of irregular heart rhythm  Increased risk for cardiovascular disease  Decreased sexual drive  Morning headaches  Increased risk of motor vehicle accident    How is YOUSIF treated? The first line of treatment for YOUSIF is continuous positive airway pressure (CPAP). A CPAP device provides air though a mask that fits over your nose or mouth and nose. The CPAP provides enough airflow to prevent the airway from collapsing when sleeping. This air can be humidified for comfort. Newer masks fit comfortably over the nasal opening rather than over the nose. It is important that CPAP is used regularly each night for optimal results.   Patients should notice improvement in sleepiness within the first week or two. Several second line therapies exist for YOUSIF. Behavioral therapy for YOUSIF includes weight loss and positional therapy which is avoidance of sleeping on ones back. Significant improvement in YOUSIF can occur with as little as 10% weight loss. Dental devices that hold the lower jaw or tongue forward during sleep can also relieve YOUSIF. These devices are not always as effective as CPAP but are preferred by some patients. Surgical procedures are also options. But it is often hard to predict how effective a surgical treatment will be in reducing or eliminating sleep apnea. Additional Information  American Academy of Sleep Medicine (www.aasmnet. org)    [address] (www.sleepfoundation. org)    American Sleep Apnea Association ([place] Comp. org)    National Heart, Lung, and Blood [place] (www.nhlbi.nih.gov)    [employer]pto[employer] (www.uptodate.com/patients)  Weight Loss      Weight Loss is an important part of treating obstructive sleep apnea. Being at a healthy weight is improtant to prevent and/or facilitate treatment of chronic conditions such as heart disease and diabetes in addition to obstructive sleep apnea. This is optimally achieved through a healthy diet and exercise program that can be maintained long term. Drowsy Driving Tips  '  These suggestions will help prevent you from the risk of drowsy driving. 1.  If you feel tired or drowsy do not drive. Sleepiness is a major cause of motor vehicle accidents and accounts for 40% of all fatal crashes reported on the [address]. No matter how much you think you can control sleepiness, you can't.    2. Ensure you follow your doctor's advice about the treatment for your sleep disorder. For example, if you have sleep apnea and use CPAP, ensure you use it fully the night before your trip. 3. Get a good night's sleep before driving.   Do not reduce your sleep time if you plan a long drive the next day. Get to bed early and do not stay up late packing. 4. Avoid alcohol both the night before your trip and the during your trip. Alcohol will disrupt sleep and make you more tired the next day. Sleepiness and alcohol are additive in increasing impairment of your driving ability. 5. Avoid any sedative medications, including sedative antihistamines that are often contained in cold or allergy medications, the night before you drive as they may have long lasting effects the next day. 6. Travel during non-sleeping hours. Accidents due to sleepiness are more common during the nighttime hours. 7. If sleepy, stop and rest.  Drink coffee, walk around or have a brief nap in your car if you are sleepy. Have a 10-15 minute break after every 2 hours of driving. 8. Drive with a . Share the driving. Relax in the back seat until it is your time to share the driving again.

## 2023-02-22 NOTE — PROGRESS NOTES
Chief Complaint   Patient presents with    Sleep Apnea     Study 20 years ago, has not used machine in 15 years, wants new study       Irina Maldonado is a 59 y.o. male who comes in for sleep evaluation. His complaints include snoring. Onset of symptoms was several years ago. He was referred to us by cardiologist to rule out untreated YOUSIF. He was diagnosed with YOUSIF over 20 years ago, stopped using CPAP due to discomfort several years ago. Pertinent PMHx includes: YOUSIF. He goes to bed at around 11pm. He falls asleep in variable minutes (usually falls asleep while watching TV). He awakens at 6am.    He awakens at least 3 times per night. He does not use medication for sleep. He does take daytime naps daily. He describes the symptoms as daytime sleepiness, fatigue, snoring, and witnessed apneas. He denies witnessed apneas and restless sleep. He has not fallen asleep while driving. He does not have symptoms that fulfill the criteria for restless legs syndrome. Previous evaluation and treatment has included: PSG and PAP therapy. This was over 20 years ago.     Family hx of sleep disorders: sister with YOUSIF  Caffeinated drinks/day: 3 cups a day  Alcohol drinks/day/week: 10 beers a week  Occupation:       DATA REVIEWED TODAY:  [place] & Insomnia Severity Index scores  Sleep Medicine 2/22/2023 8/11/2022   Sitting and reading 3 -   Watching TV 1 -   Sitting, inactive in a public place (e.g. a theatre or a meeting) 0 -   As a passenger in a car for an hour without a break 0 -   Lying down to rest in the afternoon when circumstances permit 2 -   Sitting and talking to someone 0 -   Sitting quietly after a lunch without alcohol 2 -   In a car, while stopped for a few minutes in traffic 0 -   [place] Sleepiness Score 8 -   Neck circumference (Inches) - 16     Insomnia Severity Index (COV) 2/22/2023   Difficulty falling asleep 1   Difficulty staying asleep 2   Problems waking up too early 2 How satisfied/dissatisfied are you with your CURRENT sleep pattern? 3   How NOTICEABLE to others do you think your sleep problem is in terms of impairing the quality of life? 1   How WORRIED/DISTRESSED are you about your current sleep problem? 1   To what extent do you believe your sleep problem INTERFERES with your daily functioning (e.g. fatigue, mood,etc.) currently? 2   Insomnia Severity Index Score 12         Physical Exam  Vitals:    02/22/23 0838   BP: (!) 141/87   Pulse: 71   SpO2: 94%     There were no vitals filed for this visit. General: alert and oriented, no apparent distress  HEENT:  Tongue: Ridging:Yes  Overjet: No  Retrognathia: No  Tonsils: Yes: 1+ bilaterally  Uvula: partial, s/p partial uvulectomy  Chest:  Auscultation: CTA, crackles, no; wheezing, no; nl excursion bilaterally  Heart:  RRR: No murmurs, rubs or gallops  Normal PMI  Skin: warm, no rashes    Ext:  Edema: No  Pulses: 2+ equal and bilateral  Neuro: alert and oriented     Mallampati Airway Classification:   []1 []2 []3 [x]4        Assessment and Plan  The primary encounter diagnosis was Hypersomnia. Diagnoses of YOUSIF (obstructive sleep apnea), Elevated BP without diagnosis of hypertension, and Obesity (BMI 30-39. 9) were also pertinent to this visit. Orders Placed This Encounter   Procedures    Home Sleep Study          Carmen Perry is a 59 y.o. male with pertinent PMHx of YOUSIF, presenting with symptoms of snoring and hypersomnia. He was referred to us by cardiologist to rule out untreated YOUSIF. Patient also with hx of YOUSIF over 20 years ago, stopped using CPAP due to discomfort several years ago. However now that he understands that untreated YOUSIF is associated with heart disease, he is willing to use CPAP if he still has YOUSIF. Previous sleep study not available for our review. He has risk factors for sleep disordered breathing including snoring, daytime sleepiness, obesity. Patient has elevated BP without diagnosis of HTN. I will order a home sleep test to further evaluate this. We discussed treatment options and he is willing to consider CPAP treatment. If the study is positive for obstructive sleep apnea, we will therefore proceed with auto CPAP unless in lab PAP titration is indicated based on the sleep study. He understands that untreated YOUSIF is associated with heart failure, atrial fibrillation, stroke, HTN, Alzheimer's and impaired glucose tolerance. He should avoid respiratory suppressants as these can worsen sleep disordered breathing. He should never drive if drowsy and should pull over at a safe place if he becomes drowsy while driving. A handout on drowsy driving tips was given to the patient. Elevated BP: patient reports he has been borderline for many years. Patient recommended to adhere to healthy lifestyle, including Dash diet and low-salt diet. Patient recommended to address elevated BP with PCP for further evaluation and management. Obesity: Weight loss is associated with improvement in sleep disordered breathing. Alirio Scruggs should exercise regularly and watch his diet. Return if symptoms worsen or fail to improve. Will schedule patient for follow-up if test is positive for YOUSIF.     Candelario Velazquez MD  8:29 AM

## 2023-03-06 ENCOUNTER — HOSPITAL ENCOUNTER (OUTPATIENT)
Dept: SLEEP CENTER | Age: 58
Discharge: HOME | End: 2023-03-06
Payer: COMMERCIAL

## 2023-03-06 DIAGNOSIS — E66.9 OBESITY (BMI 30-39.9): ICD-10-CM

## 2023-03-06 DIAGNOSIS — G47.33 OSA (OBSTRUCTIVE SLEEP APNEA): ICD-10-CM

## 2023-03-06 DIAGNOSIS — G47.10 HYPERSOMNIA: ICD-10-CM

## 2023-03-06 DIAGNOSIS — R03.0 ELEVATED BP WITHOUT DIAGNOSIS OF HYPERTENSION: ICD-10-CM

## 2023-03-06 PROCEDURE — 95806 SLEEP STUDY UNATT&RESP EFFT: CPT

## 2023-03-09 ENCOUNTER — TELEPHONE (OUTPATIENT)
Dept: PULMONOLOGY | Age: 58
End: 2023-03-09

## 2023-03-09 NOTE — TELEPHONE ENCOUNTER
I attempted to reach patient by phone but left him a VM. I will send him a [employer] message.     Thanks   Unknown MD Sarah

## 2023-03-09 NOTE — PROGRESS NOTES
Diagnosis: [x] YOUSIF  (327.23) [] CSA (327.21) []  Other:__________________   Length of Need: [] 13 months [x]  99 Months                                          []  Other:__________________   Machine (GILMA): [] Respironics Auto       [] Other:____________________    [x]  ResMed Auto    [x]  CPAP () [] Bilevel ()   Mode: Mode:   [x] Auto [] Fixed [] Auto [] Spontaneous   Pmin:____5_____cmH2O      Pmax:____15_____cmH2O   P:_________cmH2O    EPAPmin:__________cmH2O IPAP:__________cmH2O     IPAPmax:__________cmH2O EPAP:__________cmH2O     PSmin:_______  PSmax:_______       (ResMed) PS:_________     Flex/EPR - 3 full time                          Ramp time: 30 min Flex/EPR - 3 full time                 Ramp time: 30 min   Ramp Pressure:_____4______cmH2O Ramp Pressure:____________cmH2O         Humidifier: [x] Heated ()                                               [] Passive     [x] Water chamber replacement ()/ 1 per 6 months   Mask:   [x] Nasal () /1 per 3 months [x] Full Face () /1 per 3 months   [x] Patient choice -Size and fit mask [x] Patient Choice - Size and fit mask   [] Dispense:__________________________________ [] Dispense_____________________________________   [x] Headgear () / 1 per 3 months [x] Headgear () / 1 per 3 months   [x] Replacement Nasal Cushion ()/2 per month [x] Interface Replacement ()/1 per month   [x] Replacement Nasal Pillows ()/2 per month       Tubing: [x] Heated ()/1 per 3 months                           [] Standard ()/1 per 3 months  [] Other:____________________   Filters: [x] Non-disposable ()/1 per 6 months                 [x] Ultra-Fine, Disposable ()/2 per month     Miscellaneous: [] Chin Strap ()/ 1 per 6months      [] Other:__________________________________         Start Order Date: 03/09/23    MEDICAL JUSTIFICATION:  I, the undersigned, certify that the above prescribed supplies are medically necessary for this patients wellbeing. In my opinion, the supplies are both reasonable and necessary in reference to accepted standards of medicalpractice in treatment of this patients condition.     Harjinder Medina MD     NPI: [id number]       Order Signed Date: 03/09/23    Electronically signed by Harjinder Medina MD on 3/9/2023 at 12:05 PM.

## 2023-03-10 PROBLEM — R03.0 ELEVATED BP WITHOUT DIAGNOSIS OF HYPERTENSION: Status: ACTIVE | Noted: 2023-03-10

## 2023-03-27 PROBLEM — R06.02 SHORTNESS OF BREATH: Status: ACTIVE | Noted: 2023-03-27

## 2023-03-27 ASSESSMENT — ENCOUNTER SYMPTOMS: SHORTNESS OF BREATH: 1

## 2023-03-27 NOTE — PROGRESS NOTES
[address]  3/28/23  Referring: Dr. Aidee Orellana CONSULT/CHIEF COMPLAINT/HPI     Reason for visit/ Chief complaint  Follow up   AR, edema   HPI Denisha Novoa is a 59 y. o.seen today as a follow up for management of AR, GERD arthritis, galdamez's esophagus, TKR (2018), sleep apnea. Sister, and three aunts had an ablation. In the interval since the last visit he had an echo to evaluate edema and shortness of breath. This showed mild-moderate AR. He had an outpatient sleep study that showed severe YOUSIF. Today he is feeling well. Has no exertional chest pain palpitations dizziness or syncope. Unchanged exertional shortness of breath. Walks 6000 steps a day. Does have chest tightness if he bends stretches, reaches, pulling with arms, lasting seconds. This never occurs with walking or exertion. Walks and hikes routinely without exercise limitations. Has shortness of breath if he walks with his dog uphill, alleviated with rest. This is not worsening or increasing in severity        Patient is adherent with medications and is tolerating them well without side effects     HISTORY/ALLERGIES/ROS     MedHx:  has a past medical history of Acid reflux, Anesthesia complication, Arthritis, Galdamez's esophagus, H/O total knee replacement, and Sleep apnea. SurgHx:  has a past surgical history that includes Elbow surgery; other surgical history; Knee arthroscopy (Bilateral); Colonoscopy; Carpal tunnel release (Left, 10/07/2014); Ulnar tunnel release (Left, 10/07/2014); Carpal tunnel release (Right, 11/25/2014); Ulnar tunnel release (Right, 11/25/2014); Umbilical hernia repair (06/25/2015); Upper gastrointestinal endoscopy; Upper gastrointestinal endoscopy (11/16/2017); Anterior cruciate ligament repair (1991); other surgical history; pr tendon sheath incision (Left, 10/18/2018); Foot surgery (Left); Total knee arthroplasty (Left, 12/17/2019); joint replacement (Bilateral);  Colonoscopy (N/A,

## 2023-03-28 ENCOUNTER — OFFICE VISIT (OUTPATIENT)
Dept: CARDIOLOGY CLINIC | Age: 58
End: 2023-03-28
Payer: COMMERCIAL

## 2023-03-28 VITALS
OXYGEN SATURATION: 97 % | HEART RATE: 72 BPM | HEIGHT: 72 IN | WEIGHT: 258.8 LBS | DIASTOLIC BLOOD PRESSURE: 84 MMHG | BODY MASS INDEX: 35.05 KG/M2 | SYSTOLIC BLOOD PRESSURE: 126 MMHG

## 2023-03-28 DIAGNOSIS — E66.9 OBESITY, UNSPECIFIED CLASSIFICATION, UNSPECIFIED OBESITY TYPE, UNSPECIFIED WHETHER SERIOUS COMORBIDITY PRESENT: ICD-10-CM

## 2023-03-28 DIAGNOSIS — I35.1 AORTIC VALVE INSUFFICIENCY, ETIOLOGY OF CARDIAC VALVE DISEASE UNSPECIFIED: ICD-10-CM

## 2023-03-28 DIAGNOSIS — R60.0 BILATERAL LEG EDEMA: Primary | ICD-10-CM

## 2023-03-28 DIAGNOSIS — I20.8 OTHER FORMS OF ANGINA PECTORIS (HCC): ICD-10-CM

## 2023-03-28 DIAGNOSIS — R06.02 SHORTNESS OF BREATH: ICD-10-CM

## 2023-03-28 DIAGNOSIS — G47.33 OSA (OBSTRUCTIVE SLEEP APNEA): ICD-10-CM

## 2023-03-28 PROCEDURE — 99214 OFFICE O/P EST MOD 30 MIN: CPT | Performed by: INTERNAL MEDICINE

## 2023-03-28 RX ORDER — CARVEDILOL 6.25 MG/1
6.25 TABLET ORAL SEE ADMIN INSTRUCTIONS
Qty: 2 TABLET | Refills: 0 | Status: SHIPPED | OUTPATIENT
Start: 2023-03-28

## 2023-03-28 RX ORDER — METOPROLOL TARTRATE 50 MG/1
50 TABLET, FILM COATED ORAL SEE ADMIN INSTRUCTIONS
Qty: 2 TABLET | Refills: 0 | Status: CANCELLED | OUTPATIENT
Start: 2023-03-28

## 2023-03-28 ASSESSMENT — ENCOUNTER SYMPTOMS: CHEST TIGHTNESS: 1

## 2023-03-28 NOTE — LETTER
March 28, 2023      [address] [place] [address], DAVIS - NP  [facility]      Patient: Damir Reza   MR Number: [id number]   YOB: 1965   Date of Visit: 3/28/2023       Dear [address] [place] [address]:    Thank you for referring Caleb Alejandra to me for evaluation/treatment. Below are the relevant portions of my assessment and plan of care. If you have questions, please do not hesitate to call me. I look forward to following Rachel Rocha along with you.     Sincerely,        Johan Reza, DO

## 2023-03-28 NOTE — PATIENT INSTRUCTIONS
Coronary cta- take coreg 6.25 mg the night before the test and one hour prior testing   Goal 10,000 steps a day   Continue nightly cpap   Ok to take higher dose of meloxicam for pain   Aortic regurgitation

## 2023-04-18 ENCOUNTER — TELEPHONE (OUTPATIENT)
Dept: INTERVENTIONAL RADIOLOGY/VASCULAR | Age: 58
End: 2023-04-18

## 2023-04-19 ENCOUNTER — OFFICE VISIT (OUTPATIENT)
Dept: PULMONOLOGY | Age: 58
End: 2023-04-19
Payer: COMMERCIAL

## 2023-04-19 VITALS
SYSTOLIC BLOOD PRESSURE: 130 MMHG | OXYGEN SATURATION: 97 % | HEART RATE: 86 BPM | DIASTOLIC BLOOD PRESSURE: 78 MMHG | HEIGHT: 72 IN | WEIGHT: 258 LBS | BODY MASS INDEX: 34.95 KG/M2

## 2023-04-19 DIAGNOSIS — E66.9 OBESITY (BMI 30-39.9): ICD-10-CM

## 2023-04-19 DIAGNOSIS — G47.33 OSA ON CPAP: Primary | ICD-10-CM

## 2023-04-19 DIAGNOSIS — Z99.89 OSA ON CPAP: Primary | ICD-10-CM

## 2023-04-19 PROCEDURE — 99214 OFFICE O/P EST MOD 30 MIN: CPT | Performed by: STUDENT IN AN ORGANIZED HEALTH CARE EDUCATION/TRAINING PROGRAM

## 2023-04-19 ASSESSMENT — SLEEP AND FATIGUE QUESTIONNAIRES
HOW LIKELY ARE YOU TO NOD OFF OR FALL ASLEEP WHILE LYING DOWN TO REST IN THE AFTERNOON WHEN CIRCUMSTANCES PERMIT: 3
ESS TOTAL SCORE: 9
HOW LIKELY ARE YOU TO NOD OFF OR FALL ASLEEP WHILE SITTING AND READING: 2
HOW LIKELY ARE YOU TO NOD OFF OR FALL ASLEEP WHILE SITTING AND TALKING TO SOMEONE: 0
HOW LIKELY ARE YOU TO NOD OFF OR FALL ASLEEP WHILE SITTING INACTIVE IN A PUBLIC PLACE: 0
HOW LIKELY ARE YOU TO NOD OFF OR FALL ASLEEP WHILE WATCHING TV: 2
HOW LIKELY ARE YOU TO NOD OFF OR FALL ASLEEP WHILE SITTING QUIETLY AFTER LUNCH WITHOUT ALCOHOL: 0
HOW LIKELY ARE YOU TO NOD OFF OR FALL ASLEEP IN A CAR, WHILE STOPPED FOR A FEW MINUTES IN TRAFFIC: 1
HOW LIKELY ARE YOU TO NOD OFF OR FALL ASLEEP WHEN YOU ARE A PASSENGER IN A CAR FOR AN HOUR WITHOUT A BREAK: 1

## 2023-04-19 NOTE — PATIENT INSTRUCTIONS
YOUSIF education:    You have obstructive sleep apnea (YOUSIF):    1. Obstructive sleep apnea (YOUSIF) is a condition where the upper airway narrows or closes intermittently during sleep. This can lead to drops in your oxygen levels during sleep, arousals from sleep, and excessive daytime sleepiness. 2. Untreated YOUSIF is associated with increased risk of hypertension, cardiac disease, myocardial infarction, stroke, and poor blood sugar control. Treating your YOUSIF can decrease these risks. 3. Weight loss does improve sleep apnea. It is important to have a healthy diet and an exercise program.     4. Alcohol and sedating medicine can make YOUSIF worse and should be avoided in particular before sleep. 5. If you have surgery or are hospitalized, tell your doctor that you have YOUSIF and bring your CPAP to the hospital.    6. If you are drowsy or sleepy, you should not drive. If you are driving and become drowsy or sleepy, you should pull over to a safe place. Below are our drowsy driving tips. PAP machine care: You should clean your PAP regularly (see your PAP  site for more details)  Daily cleaning   1. Wipe mask with a damp towel with mild detergent, rinse with a damp towel and let air dry. You can also see CPAP cleaning wipes. Avoid harsh cleaning wipes or chemicals. 2. Humidifier- empty water daily. Fill with clean distilled water before use before sleep. Weekly Cleaning   1. Clean mask, headgear, and tubing weekly  2. Fill your sink with warm water and a few drops of mild dish soap. Swirl equipment for at least 5 minutes. Rinse well and air dry. Hang hose over something so the water droplets drip out. 3. Clean filter- rinse with warm water, squeeze excess water and blot dry with towel. If there is a white filter (respironics machine)- do not wash that - it is disposable and should be changed once a month.    4. Humidifier- Disinfect in solution that is one part vinegar and 5 parts water for 30

## 2023-04-19 NOTE — PROGRESS NOTES
[facility]  Sleep Medicine  [address] [place] [address], [address] [facility],[address]  [facility], [address]    Chief Complaint   Patient presents with    Sleep Apnea     Doing okay with machine, getting use to it   Sleeping 4 to 6 hours of night, he hasn't been getting up in the middle of the night like he use too so he does feel he gets a deeper night rest.  Does droll on the right side due to bellpalsy             Fayetta Habermann comes in today for sleep apnea follow-up . He was diagnosed with severe obstructive sleep apnea and is being treated with CPAP. He has been on CPAP for about 1  months. He states he wears the CPAP most nights. He falls asleep in within 20-30 minutes. He awakens a few times per night and takes naps sometimes but this . Symptoms of sleep apnea haveimproved since using CPAP. He is not snoring with the machine on. He denies any complaints of too high pressure, hungry for air, dry mouth, mask fit / discomfort, low air humidity, and high air humidity. He states  difficulty sleeping on the right side due to drooling (Bell's palsy) and this leading to him having to readjust the mask from time to time . He also complains of humidity being too high. Pressure could be a little higher and wants ramp off. DME: [facility]  Mask: Full facemask  Machine: ResMed Airsense 11 Autoset    Diagnostic Review  HST on 3/6/2023 showed respiratory event index of 51.3/h with oxygen desaturation down to a gladys of 76%. He is currently on auto CPAP with pressure of 5-15 cmH2O.     DATA REVIEWED TODAY:    [place]       Sleep Medicine 4/19/2023 2/22/2023 8/11/2022   Sitting and reading 2 3 -   Watching TV 2 1 -   Sitting, inactive in a public place (e.g. a theatre or a meeting) 0 0 -   As a passenger in a car for an hour without a break 1 0 -   Lying down to rest in the afternoon when circumstances permit 3 2 -   Sitting and talking to someone 0 0 -   Sitting quietly after a lunch without alcohol 0 2 -   In a

## 2023-04-26 ENCOUNTER — HOSPITAL ENCOUNTER (OUTPATIENT)
Dept: CT IMAGING | Age: 58
Discharge: HOME | End: 2023-04-26
Payer: COMMERCIAL

## 2023-04-26 VITALS
BODY MASS INDEX: 34.95 KG/M2 | HEIGHT: 72 IN | DIASTOLIC BLOOD PRESSURE: 80 MMHG | TEMPERATURE: 98.2 F | SYSTOLIC BLOOD PRESSURE: 120 MMHG | OXYGEN SATURATION: 96 % | WEIGHT: 258 LBS | RESPIRATION RATE: 16 BRPM | HEART RATE: 62 BPM

## 2023-04-26 DIAGNOSIS — I35.1 AORTIC VALVE INSUFFICIENCY, ETIOLOGY OF CARDIAC VALVE DISEASE UNSPECIFIED: ICD-10-CM

## 2023-04-26 DIAGNOSIS — R06.02 SHORTNESS OF BREATH: ICD-10-CM

## 2023-04-26 DIAGNOSIS — I20.8 OTHER FORMS OF ANGINA PECTORIS (HCC): ICD-10-CM

## 2023-04-26 PROCEDURE — 2500000003 HC RX 250 WO HCPCS: Performed by: STUDENT IN AN ORGANIZED HEALTH CARE EDUCATION/TRAINING PROGRAM

## 2023-04-26 PROCEDURE — 6360000004 HC RX CONTRAST MEDICATION: Performed by: INTERNAL MEDICINE

## 2023-04-26 PROCEDURE — 75574 CT ANGIO HRT W/3D IMAGE: CPT

## 2023-04-26 RX ORDER — METOPROLOL TARTRATE 5 MG/5ML
5 INJECTION INTRAVENOUS EVERY 5 MIN PRN
Status: DISCONTINUED | OUTPATIENT
Start: 2023-04-26 | End: 2023-04-27 | Stop reason: HOSPADM

## 2023-04-26 RX ORDER — NITROGLYCERIN 0.4 MG/1
0.4 TABLET SUBLINGUAL ONCE
Status: DISCONTINUED | OUTPATIENT
Start: 2023-04-26 | End: 2023-04-27 | Stop reason: HOSPADM

## 2023-04-26 RX ADMIN — METOPROLOL TARTRATE 5 MG: 1 INJECTION, SOLUTION INTRAVENOUS at 09:02

## 2023-04-26 RX ADMIN — METOPROLOL TARTRATE 5 MG: 1 INJECTION, SOLUTION INTRAVENOUS at 09:10

## 2023-04-26 RX ADMIN — IOPAMIDOL 85 ML: 755 INJECTION, SOLUTION INTRAVENOUS at 09:28

## 2023-04-26 NOTE — PROGRESS NOTES
Procedure complete, pt tolerated well, PIV removed without complicaton  Pt given verbal and written instructions, pt verbalizes understanding

## 2023-04-27 ASSESSMENT — ENCOUNTER SYMPTOMS
SHORTNESS OF BREATH: 1
CHEST TIGHTNESS: 1

## 2023-04-28 ENCOUNTER — OFFICE VISIT (OUTPATIENT)
Dept: CARDIOLOGY CLINIC | Age: 58
End: 2023-04-28
Payer: COMMERCIAL

## 2023-04-28 VITALS
OXYGEN SATURATION: 96 % | HEART RATE: 81 BPM | BODY MASS INDEX: 34.67 KG/M2 | HEIGHT: 72 IN | DIASTOLIC BLOOD PRESSURE: 80 MMHG | SYSTOLIC BLOOD PRESSURE: 115 MMHG | WEIGHT: 256 LBS

## 2023-04-28 DIAGNOSIS — E66.9 OBESITY, UNSPECIFIED CLASSIFICATION, UNSPECIFIED OBESITY TYPE, UNSPECIFIED WHETHER SERIOUS COMORBIDITY PRESENT: ICD-10-CM

## 2023-04-28 DIAGNOSIS — G47.33 OSA (OBSTRUCTIVE SLEEP APNEA): ICD-10-CM

## 2023-04-28 DIAGNOSIS — I25.83 CORONARY ARTERY DISEASE DUE TO LIPID RICH PLAQUE: Primary | ICD-10-CM

## 2023-04-28 DIAGNOSIS — I35.1 AORTIC VALVE INSUFFICIENCY, ETIOLOGY OF CARDIAC VALVE DISEASE UNSPECIFIED: ICD-10-CM

## 2023-04-28 DIAGNOSIS — I25.10 CORONARY ARTERY DISEASE DUE TO LIPID RICH PLAQUE: Primary | ICD-10-CM

## 2023-04-28 DIAGNOSIS — R06.02 SHORTNESS OF BREATH: ICD-10-CM

## 2023-04-28 PROCEDURE — 99215 OFFICE O/P EST HI 40 MIN: CPT | Performed by: INTERNAL MEDICINE

## 2023-04-28 RX ORDER — ATORVASTATIN CALCIUM 40 MG/1
40 TABLET, FILM COATED ORAL DAILY
Qty: 90 TABLET | Refills: 3 | Status: SHIPPED | OUTPATIENT
Start: 2023-04-28

## 2023-04-28 RX ORDER — CARVEDILOL 3.12 MG/1
3.12 TABLET ORAL 2 TIMES DAILY
Qty: 180 TABLET | Refills: 1 | Status: SHIPPED | OUTPATIENT
Start: 2023-04-28

## 2023-04-28 RX ORDER — ASPIRIN 81 MG/1
81 TABLET ORAL DAILY
Qty: 90 TABLET | Refills: 1 | Status: SHIPPED | OUTPATIENT
Start: 2023-04-28

## 2023-04-29 ENCOUNTER — TELEPHONE (OUTPATIENT)
Dept: CARDIOLOGY CLINIC | Age: 58
End: 2023-04-29

## 2023-05-10 ENCOUNTER — HOSPITAL ENCOUNTER (OUTPATIENT)
Dept: CARDIAC CATH/INVASIVE PROCEDURES | Age: 58
Discharge: HOME | End: 2023-05-10
Attending: INTERNAL MEDICINE | Admitting: INTERNAL MEDICINE
Payer: COMMERCIAL

## 2023-05-10 VITALS
DIASTOLIC BLOOD PRESSURE: 73 MMHG | WEIGHT: 256 LBS | HEART RATE: 66 BPM | SYSTOLIC BLOOD PRESSURE: 115 MMHG | BODY MASS INDEX: 34.67 KG/M2 | RESPIRATION RATE: 15 BRPM | HEIGHT: 72 IN

## 2023-05-10 DIAGNOSIS — R06.02 SHORTNESS OF BREATH: ICD-10-CM

## 2023-05-10 DIAGNOSIS — I25.10 CORONARY ARTERY DISEASE DUE TO LIPID RICH PLAQUE: ICD-10-CM

## 2023-05-10 DIAGNOSIS — I25.83 CORONARY ARTERY DISEASE DUE TO LIPID RICH PLAQUE: ICD-10-CM

## 2023-05-10 LAB
ANION GAP SERPL CALCULATED.3IONS-SCNC: 8 MMOL/L (ref 3–16)
BUN SERPL-MCNC: 12 MG/DL (ref 7–20)
CALCIUM SERPL-MCNC: 9.2 MG/DL (ref 8.3–10.6)
CHLORIDE SERPL-SCNC: 103 MMOL/L (ref 99–110)
CO2 SERPL-SCNC: 28 MMOL/L (ref 21–32)
CREAT SERPL-MCNC: 0.8 MG/DL (ref 0.9–1.3)
DEPRECATED RDW RBC AUTO: 14 % (ref 12.4–15.4)
EKG ATRIAL RATE: 66 BPM
EKG DIAGNOSIS: NORMAL
EKG P AXIS: 37 DEGREES
EKG P-R INTERVAL: 160 MS
EKG Q-T INTERVAL: 424 MS
EKG QRS DURATION: 88 MS
EKG QTC CALCULATION (BAZETT): 444 MS
EKG R AXIS: -16 DEGREES
EKG T AXIS: 56 DEGREES
EKG VENTRICULAR RATE: 66 BPM
GFR SERPLBLD CREATININE-BSD FMLA CKD-EPI: >60 ML/MIN/{1.73_M2}
GLUCOSE SERPL-MCNC: 116 MG/DL (ref 70–99)
HCT VFR BLD AUTO: 42.7 % (ref 40.5–52.5)
HGB BLD-MCNC: 14.4 G/DL (ref 13.5–17.5)
MCH RBC QN AUTO: 29.4 PG (ref 26–34)
MCHC RBC AUTO-ENTMCNC: 33.8 G/DL (ref 31–36)
MCV RBC AUTO: 86.8 FL (ref 80–100)
PLATELET # BLD AUTO: 296 K/UL (ref 135–450)
PMV BLD AUTO: 7.9 FL (ref 5–10.5)
POTASSIUM SERPL-SCNC: 3.5 MMOL/L (ref 3.5–5.1)
RBC # BLD AUTO: 4.92 M/UL (ref 4.2–5.9)
SODIUM SERPL-SCNC: 139 MMOL/L (ref 136–145)
WBC # BLD AUTO: 8.5 K/UL (ref 4–11)

## 2023-05-10 PROCEDURE — 99152 MOD SED SAME PHYS/QHP 5/>YRS: CPT | Performed by: INTERNAL MEDICINE

## 2023-05-10 PROCEDURE — 99152 MOD SED SAME PHYS/QHP 5/>YRS: CPT

## 2023-05-10 PROCEDURE — 99153 MOD SED SAME PHYS/QHP EA: CPT

## 2023-05-10 PROCEDURE — 2500000003 HC RX 250 WO HCPCS

## 2023-05-10 PROCEDURE — C1894 INTRO/SHEATH, NON-LASER: HCPCS

## 2023-05-10 PROCEDURE — 36415 COLL VENOUS BLD VENIPUNCTURE: CPT

## 2023-05-10 PROCEDURE — 6360000002 HC RX W HCPCS

## 2023-05-10 PROCEDURE — 93010 ELECTROCARDIOGRAM REPORT: CPT | Performed by: INTERNAL MEDICINE

## 2023-05-10 PROCEDURE — 6360000004 HC RX CONTRAST MEDICATION: Performed by: INTERNAL MEDICINE

## 2023-05-10 PROCEDURE — 93458 L HRT ARTERY/VENTRICLE ANGIO: CPT

## 2023-05-10 PROCEDURE — 93005 ELECTROCARDIOGRAM TRACING: CPT | Performed by: INTERNAL MEDICINE

## 2023-05-10 PROCEDURE — 85027 COMPLETE CBC AUTOMATED: CPT

## 2023-05-10 PROCEDURE — C1769 GUIDE WIRE: HCPCS

## 2023-05-10 PROCEDURE — 80048 BASIC METABOLIC PNL TOTAL CA: CPT

## 2023-05-10 PROCEDURE — 93458 L HRT ARTERY/VENTRICLE ANGIO: CPT | Performed by: INTERNAL MEDICINE

## 2023-05-10 PROCEDURE — 2709999900 HC NON-CHARGEABLE SUPPLY

## 2023-05-10 RX ORDER — SODIUM CHLORIDE 0.9 % (FLUSH) 0.9 %
5-40 SYRINGE (ML) INJECTION PRN
Status: DISCONTINUED | OUTPATIENT
Start: 2023-05-10 | End: 2023-05-10 | Stop reason: HOSPADM

## 2023-05-10 RX ADMIN — IOPAMIDOL 70 ML: 755 INJECTION, SOLUTION INTRAVENOUS at 09:18

## 2023-05-10 NOTE — SEDATION DOCUMENTATION
of Anesthesia Complications:   none    Modified Mallampati:  III (soft palate, base of uvula visible)    ASA Classification:  Class 2 - A normal healthy patient with mild systemic disease    Justen Scale: Activity:  2 - Able to move 4 extremities voluntarily on command  Respiration:  2 - Able to breathe deeply and cough freely  Circulation:  2 - BP+/- 20mmHg of normal  Consciousness:  2 - Fully awake  Oxygen Saturation (color):  2 - Able to maintain oxygen saturation >92% on room air    Sedation/Anesthesia Plan:  Guard the patient's safety and welfare. Minimize physical discomfort and pain. Minimize negative psychological responses to treatment by providing sedation and analgesia and maximize the potential amnesia. Patient to meet pre-procedure discharge plan.     Medication Planned:  midazolam intravenously and fentanyl intravenously    Patient is an appropriate candidate for plan of sedation:   yes      Electronically signed by Natasha Maddox DO on 5/10/2023 at 8:01 AM
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
## 2023-05-10 NOTE — OP NOTE
Patient:  Sujit Liu   :   1965    Procedural Summary  ~Consent:   Obtained written and verbal consent      Risks/benefits explained in detail  ~Procedure:    Left Heart Catheterization  ~Medications:    Procedural sedation with minimal conscious sedation  ~Complications:   None  ~Blood Loss:    <10cc  ~Specimens:    None obtained  ~Pre-sedation re-evaluation: Performed immediately prior to procedure. ~Bleeding risk:  low    Medication and Procedural Reconciliation:  An independent trained observer pushed medications at my direction. We monitored the patient's level of consciousness and vital signs/physiologic status throughout the procedure duration (see start and stop times below). Sedation: 1 mg Versed, 50 mcg Fentanyl, 50 mg benadryl  Sedation start: 832  Sedation stop: 906    Cardiac Cath :   Anatomy:   LM-patent   LAD-30% mid stenosis  Cx-patent  OM- patent  RCA-patent  RPDA- patent  LVEF- not performed  LVG- not performed  LVEDP- 12 mmHg    Contrast: 70 ml  Flouro Time: 6.6 min  Access: right radial artery    Impression  ~Coronary Angiography w/ mild non-obstructive cad      Recommendation  ~Aggressive medical treatment and risk factor modification  ~continue aspirin, statin, betablocker  ~ANGELA to evaluate leg pain  ~follow up in clinic on 23          Spencer Hallman DO, MD 5/10/2023 9:16 AM

## 2023-05-11 ENCOUNTER — TELEPHONE (OUTPATIENT)
Dept: CARDIOLOGY CLINIC | Age: 58
End: 2023-05-11

## 2023-05-11 DIAGNOSIS — M79.605 LEG PAIN, BILATERAL: Primary | ICD-10-CM

## 2023-05-11 DIAGNOSIS — M79.605 PAIN IN BOTH LOWER EXTREMITIES: ICD-10-CM

## 2023-05-11 DIAGNOSIS — M79.604 PAIN IN BOTH LOWER EXTREMITIES: ICD-10-CM

## 2023-05-11 DIAGNOSIS — I73.9 CLAUDICATION (HCC): Primary | ICD-10-CM

## 2023-05-11 DIAGNOSIS — M79.604 LEG PAIN, BILATERAL: Primary | ICD-10-CM

## 2023-05-11 NOTE — TELEPHONE ENCOUNTER
Pt states he cannot schedule until ANGELA Test is ordered. He does not see it in his My Chart and asking if it has been ordered yet. Please advise pt.

## 2023-05-11 NOTE — TELEPHONE ENCOUNTER
Op report from 5/10/23 cath     Recommendation  ~Aggressive medical treatment and risk factor modification  ~continue aspirin, statin, betablocker  ~ANGELA to evaluate leg pain  ~follow up in clinic on 6/28/23     I did not see an order in epic.

## 2023-05-31 ENCOUNTER — OFFICE VISIT (OUTPATIENT)
Dept: FAMILY MEDICINE CLINIC | Age: 58
End: 2023-05-31
Payer: COMMERCIAL

## 2023-05-31 VITALS
OXYGEN SATURATION: 97 % | TEMPERATURE: 98 F | HEART RATE: 86 BPM | WEIGHT: 255 LBS | BODY MASS INDEX: 34.58 KG/M2 | SYSTOLIC BLOOD PRESSURE: 118 MMHG | DIASTOLIC BLOOD PRESSURE: 82 MMHG

## 2023-05-31 DIAGNOSIS — M17.0 PRIMARY OSTEOARTHRITIS OF BOTH KNEES: ICD-10-CM

## 2023-05-31 DIAGNOSIS — E66.9 CLASS 1 OBESITY WITH SERIOUS COMORBIDITY AND BODY MASS INDEX (BMI) OF 34.0 TO 34.9 IN ADULT, UNSPECIFIED OBESITY TYPE: ICD-10-CM

## 2023-05-31 DIAGNOSIS — L60.0 INGROWN TOENAIL OF RIGHT FOOT: ICD-10-CM

## 2023-05-31 DIAGNOSIS — Z12.5 SCREENING FOR MALIGNANT NEOPLASM OF PROSTATE: ICD-10-CM

## 2023-05-31 DIAGNOSIS — E55.9 VITAMIN D DEFICIENCY: ICD-10-CM

## 2023-05-31 DIAGNOSIS — H91.90 HEARING LOSS, UNSPECIFIED HEARING LOSS TYPE, UNSPECIFIED LATERALITY: ICD-10-CM

## 2023-05-31 DIAGNOSIS — E78.00 PURE HYPERCHOLESTEROLEMIA: ICD-10-CM

## 2023-05-31 DIAGNOSIS — Z00.00 PREVENTATIVE HEALTH CARE: Primary | ICD-10-CM

## 2023-05-31 DIAGNOSIS — K21.9 GASTROESOPHAGEAL REFLUX DISEASE WITHOUT ESOPHAGITIS: ICD-10-CM

## 2023-05-31 DIAGNOSIS — Z11.1 SCREENING FOR TUBERCULOSIS: ICD-10-CM

## 2023-05-31 DIAGNOSIS — R73.9 HYPERGLYCEMIA: ICD-10-CM

## 2023-05-31 PROCEDURE — 99214 OFFICE O/P EST MOD 30 MIN: CPT | Performed by: NURSE PRACTITIONER

## 2023-05-31 RX ORDER — GABAPENTIN 600 MG/1
600 TABLET ORAL 3 TIMES DAILY
COMMUNITY
Start: 2023-05-29

## 2023-05-31 SDOH — ECONOMIC STABILITY: INCOME INSECURITY: HOW HARD IS IT FOR YOU TO PAY FOR THE VERY BASICS LIKE FOOD, HOUSING, MEDICAL CARE, AND HEATING?: NOT HARD AT ALL

## 2023-05-31 SDOH — ECONOMIC STABILITY: FOOD INSECURITY: WITHIN THE PAST 12 MONTHS, YOU WORRIED THAT YOUR FOOD WOULD RUN OUT BEFORE YOU GOT MONEY TO BUY MORE.: NEVER TRUE

## 2023-05-31 SDOH — ECONOMIC STABILITY: HOUSING INSECURITY
IN THE LAST 12 MONTHS, WAS THERE A TIME WHEN YOU DID NOT HAVE A STEADY PLACE TO SLEEP OR SLEPT IN A SHELTER (INCLUDING NOW)?: NO

## 2023-05-31 SDOH — ECONOMIC STABILITY: FOOD INSECURITY: WITHIN THE PAST 12 MONTHS, THE FOOD YOU BOUGHT JUST DIDN'T LAST AND YOU DIDN'T HAVE MONEY TO GET MORE.: NEVER TRUE

## 2023-05-31 ASSESSMENT — PATIENT HEALTH QUESTIONNAIRE - PHQ9
SUM OF ALL RESPONSES TO PHQ QUESTIONS 1-9: 5
10. IF YOU CHECKED OFF ANY PROBLEMS, HOW DIFFICULT HAVE THESE PROBLEMS MADE IT FOR YOU TO DO YOUR WORK, TAKE CARE OF THINGS AT HOME, OR GET ALONG WITH OTHER PEOPLE: 1
4. FEELING TIRED OR HAVING LITTLE ENERGY: 1
SUM OF ALL RESPONSES TO PHQ9 QUESTIONS 1 & 2: 2
8. MOVING OR SPEAKING SO SLOWLY THAT OTHER PEOPLE COULD HAVE NOTICED. OR THE OPPOSITE, BEING SO FIGETY OR RESTLESS THAT YOU HAVE BEEN MOVING AROUND A LOT MORE THAN USUAL: 0
5. POOR APPETITE OR OVEREATING: 1
7. TROUBLE CONCENTRATING ON THINGS, SUCH AS READING THE NEWSPAPER OR WATCHING TELEVISION: 0
SUM OF ALL RESPONSES TO PHQ QUESTIONS 1-9: 5
SUM OF ALL RESPONSES TO PHQ QUESTIONS 1-9: 5
3. TROUBLE FALLING OR STAYING ASLEEP: 1
6. FEELING BAD ABOUT YOURSELF - OR THAT YOU ARE A FAILURE OR HAVE LET YOURSELF OR YOUR FAMILY DOWN: 0
9. THOUGHTS THAT YOU WOULD BE BETTER OFF DEAD, OR OF HURTING YOURSELF: 0
2. FEELING DOWN, DEPRESSED OR HOPELESS: 1
1. LITTLE INTEREST OR PLEASURE IN DOING THINGS: 1
SUM OF ALL RESPONSES TO PHQ QUESTIONS 1-9: 5

## 2023-05-31 NOTE — PROGRESS NOTES
Elvia Centeno  : 1965  Encounter date: 2023    This gabriela 59 y.o. male who presents with  Chief Complaint   Patient presents with    Annual Exam    Referral - General     Podiatry, hearing loss    Other     Blood test for TB for new job, wants regular labs    Hand Pain     Lt ring trigger finger x3mo       History of present illness:    HPI History and Physical      Elvia Centeno  YOB: 1965    Date of Service:  2023    Chief Complaint:   Elvia Centeno is a 59 y.o. male who  presents for physical examination. HPI: Pt is 59year old male for annual physical.  Due for labs in July. Pt being followed by Dr. Sarah Huff, cardiology for CAD. Pt had coronary angiogram completed on 5/10/23 for chronic RAMOS, results not released. Pt with chronic and generalized OA, pt established with Dr. Loida Zamora, orthopedics. Pt also established with pain management. Pt requesting TB blood test for new job. Pt also with complaints of bilateral toenail fungus and possible ingrown toenail R foot. Pt requesting hearing test, reports decreased bilateral hearing, works in sign language.     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

## 2023-06-02 ENCOUNTER — OFFICE VISIT (OUTPATIENT)
Dept: ORTHOPEDIC SURGERY | Age: 58
End: 2023-06-02

## 2023-06-02 VITALS — HEIGHT: 72 IN | BODY MASS INDEX: 34.81 KG/M2 | WEIGHT: 257 LBS

## 2023-06-02 DIAGNOSIS — M25.532 LEFT WRIST PAIN: ICD-10-CM

## 2023-06-02 DIAGNOSIS — M25.522 LEFT ELBOW PAIN: Primary | ICD-10-CM

## 2023-06-02 RX ORDER — METHYLPREDNISOLONE 4 MG/1
TABLET ORAL
Qty: 21 KIT | Refills: 0 | Status: SHIPPED | OUTPATIENT
Start: 2023-06-02

## 2023-06-03 NOTE — PROGRESS NOTES
This dictation was done with Dragon dictation and may contain mechanical errors related to translation. I have today reviewed with Jodie Blackwell the clinically relevant, past medical history, medications, allergies, family history, social history, and Review Of Systems form the patients most recent history form & I have documented any details relevant to today's presenting complaints in my history below. Mr. Milla Garcia's self-reported past medical history, medications, allergies, family history, social history, and Review Of Systems form has been scanned into the chart under the \"Media\" tab. Subjective:  Jodie Blackwell is a 59 y.o. who is here As well as established patient for years with a complaint of new onset of left elbow tricipital tendinitis left wrist and right wrist pain. 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 His past medical history includes an elbow fracture which he had surgery back in 1991 in 2019 patient he had bilateral carpal release and the cubital tunnel release.   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

## 2023-06-05 ENCOUNTER — PATIENT MESSAGE (OUTPATIENT)
Dept: FAMILY MEDICINE CLINIC | Age: 58
End: 2023-06-05

## 2023-06-05 DIAGNOSIS — K21.9 GASTROESOPHAGEAL REFLUX DISEASE WITHOUT ESOPHAGITIS: ICD-10-CM

## 2023-06-05 DIAGNOSIS — Z11.1 SCREENING FOR TUBERCULOSIS: ICD-10-CM

## 2023-06-05 DIAGNOSIS — Z00.00 PREVENTATIVE HEALTH CARE: ICD-10-CM

## 2023-06-05 DIAGNOSIS — E55.9 VITAMIN D DEFICIENCY: ICD-10-CM

## 2023-06-05 DIAGNOSIS — Z12.5 SCREENING FOR MALIGNANT NEOPLASM OF PROSTATE: ICD-10-CM

## 2023-06-05 DIAGNOSIS — R73.9 HYPERGLYCEMIA: ICD-10-CM

## 2023-06-05 DIAGNOSIS — E78.00 PURE HYPERCHOLESTEROLEMIA: ICD-10-CM

## 2023-06-05 LAB
BASOPHILS # BLD: 0 K/UL (ref 0–0.2)
BASOPHILS NFR BLD: 0 %
CREAT UR-MCNC: 161.6 MG/DL (ref 39–259)
DEPRECATED RDW RBC AUTO: 14.1 % (ref 12.4–15.4)
EOSINOPHIL # BLD: 0.1 K/UL (ref 0–0.6)
EOSINOPHIL NFR BLD: 1 %
HCT VFR BLD AUTO: 40.5 % (ref 40.5–52.5)
HGB BLD-MCNC: 13.9 G/DL (ref 13.5–17.5)
LYMPHOCYTES # BLD: 3.4 K/UL (ref 1–5.1)
LYMPHOCYTES NFR BLD: 32 %
MCH RBC QN AUTO: 29.7 PG (ref 26–34)
MCHC RBC AUTO-ENTMCNC: 34.4 G/DL (ref 31–36)
MCV RBC AUTO: 86.3 FL (ref 80–100)
MICROALBUMIN UR DL<=1MG/L-MCNC: <1.2 MG/DL
MICROALBUMIN/CREAT UR: NORMAL MG/G (ref 0–30)
MONOCYTES # BLD: 0.3 K/UL (ref 0–1.3)
MONOCYTES NFR BLD: 3 %
NEUTROPHILS # BLD: 6.6 K/UL (ref 1.7–7.7)
NEUTROPHILS NFR BLD: 59 %
NEUTS BAND NFR BLD MANUAL: 4 % (ref 0–7)
PLATELET # BLD AUTO: 269 K/UL (ref 135–450)
PMV BLD AUTO: 8.1 FL (ref 5–10.5)
RBC # BLD AUTO: 4.69 M/UL (ref 4.2–5.9)
RBC MORPH BLD: NORMAL
VARIANT LYMPHS NFR BLD MANUAL: 1 % (ref 0–6)
WBC # BLD AUTO: 10.4 K/UL (ref 4–11)

## 2023-06-06 LAB
25(OH)D3 SERPL-MCNC: 25.3 NG/ML
ALBUMIN SERPL-MCNC: 4.1 G/DL (ref 3.4–5)
ALBUMIN/GLOB SERPL: 1.6 {RATIO} (ref 1.1–2.2)
ALP SERPL-CCNC: 106 U/L (ref 40–129)
ALT SERPL-CCNC: 23 U/L (ref 10–40)
ANION GAP SERPL CALCULATED.3IONS-SCNC: 10 MMOL/L (ref 3–16)
AST SERPL-CCNC: 24 U/L (ref 15–37)
BILIRUB SERPL-MCNC: 0.3 MG/DL (ref 0–1)
BUN SERPL-MCNC: 17 MG/DL (ref 7–20)
CALCIUM SERPL-MCNC: 8.9 MG/DL (ref 8.3–10.6)
CHLORIDE SERPL-SCNC: 102 MMOL/L (ref 99–110)
CHOLEST SERPL-MCNC: 129 MG/DL (ref 0–199)
CO2 SERPL-SCNC: 28 MMOL/L (ref 21–32)
CREAT SERPL-MCNC: 0.8 MG/DL (ref 0.9–1.3)
GFR SERPLBLD CREATININE-BSD FMLA CKD-EPI: >60 ML/MIN/{1.73_M2}
GLUCOSE P FAST SERPL-MCNC: 96 MG/DL (ref 70–99)
HDLC SERPL-MCNC: 46 MG/DL (ref 40–60)
LDL CHOLESTEROL CALCULATED: 68 MG/DL
MAGNESIUM SERPL-MCNC: 2 MG/DL (ref 1.8–2.4)
POTASSIUM SERPL-SCNC: 3.9 MMOL/L (ref 3.5–5.1)
PROT SERPL-MCNC: 6.6 G/DL (ref 6.4–8.2)
PSA SERPL DL<=0.01 NG/ML-MCNC: 0.42 NG/ML (ref 0–4)
SODIUM SERPL-SCNC: 140 MMOL/L (ref 136–145)
TRIGL SERPL-MCNC: 74 MG/DL (ref 0–150)
VLDLC SERPL CALC-MCNC: 15 MG/DL

## 2023-06-06 NOTE — TELEPHONE ENCOUNTER
From: Kev Garcia  To: Denisha Agudelo  Sent: 6/5/2023 11:21 PM EDT  Subject: TB blood test for 2nd job    [employer], which test result should I send to my company for my TB clearance?   Thank you  Rodrigo Coelho

## 2023-06-07 LAB
EST. AVERAGE GLUCOSE BLD GHB EST-MCNC: 111.2 MG/DL
GAMMA INTERFERON BACKGROUND BLD IA-ACNC: 0.01 IU/ML
HBA1C MFR BLD: 5.5 %
MITOGEN IGNF BCKGRD COR BLD-ACNC: >10 IU/ML
QUANTI TB GOLD PLUS: NEGATIVE
QUANTI TB1 MINUS NIL: 0.01 IU/ML (ref 0–0.34)
QUANTI TB2 MINUS NIL: 0.01 IU/ML (ref 0–0.34)

## 2023-06-23 ENCOUNTER — HOSPITAL ENCOUNTER (OUTPATIENT)
Dept: VASCULAR LAB | Age: 58
Discharge: HOME | End: 2023-06-23
Payer: COMMERCIAL

## 2023-06-23 DIAGNOSIS — M79.604 LEG PAIN, BILATERAL: ICD-10-CM

## 2023-06-23 DIAGNOSIS — M79.605 LEG PAIN, BILATERAL: ICD-10-CM

## 2023-06-23 PROCEDURE — 93922 UPR/L XTREMITY ART 2 LEVELS: CPT

## 2023-06-28 ENCOUNTER — OFFICE VISIT (OUTPATIENT)
Dept: CARDIOLOGY CLINIC | Age: 58
End: 2023-06-28
Payer: COMMERCIAL

## 2023-06-28 ENCOUNTER — TELEPHONE (OUTPATIENT)
Dept: CARDIOLOGY CLINIC | Age: 58
End: 2023-06-28

## 2023-06-28 VITALS
BODY MASS INDEX: 33.86 KG/M2 | HEIGHT: 72 IN | DIASTOLIC BLOOD PRESSURE: 74 MMHG | SYSTOLIC BLOOD PRESSURE: 118 MMHG | HEART RATE: 64 BPM | OXYGEN SATURATION: 99 % | WEIGHT: 250 LBS

## 2023-06-28 DIAGNOSIS — I10 ESSENTIAL HYPERTENSION: ICD-10-CM

## 2023-06-28 DIAGNOSIS — E78.2 MIXED HYPERLIPIDEMIA: ICD-10-CM

## 2023-06-28 DIAGNOSIS — G47.33 OSA (OBSTRUCTIVE SLEEP APNEA): ICD-10-CM

## 2023-06-28 DIAGNOSIS — I25.10 CORONARY ARTERY DISEASE DUE TO LIPID RICH PLAQUE: Primary | ICD-10-CM

## 2023-06-28 DIAGNOSIS — I35.1 AORTIC VALVE INSUFFICIENCY, ETIOLOGY OF CARDIAC VALVE DISEASE UNSPECIFIED: ICD-10-CM

## 2023-06-28 DIAGNOSIS — E66.9 OBESITY, UNSPECIFIED CLASSIFICATION, UNSPECIFIED OBESITY TYPE, UNSPECIFIED WHETHER SERIOUS COMORBIDITY PRESENT: ICD-10-CM

## 2023-06-28 DIAGNOSIS — R60.0 BILATERAL LOWER EXTREMITY EDEMA: ICD-10-CM

## 2023-06-28 DIAGNOSIS — R06.02 SHORTNESS OF BREATH: ICD-10-CM

## 2023-06-28 DIAGNOSIS — I25.83 CORONARY ARTERY DISEASE DUE TO LIPID RICH PLAQUE: Primary | ICD-10-CM

## 2023-06-28 PROCEDURE — 3074F SYST BP LT 130 MM HG: CPT | Performed by: INTERNAL MEDICINE

## 2023-06-28 PROCEDURE — 3078F DIAST BP <80 MM HG: CPT | Performed by: INTERNAL MEDICINE

## 2023-06-28 PROCEDURE — 99214 OFFICE O/P EST MOD 30 MIN: CPT | Performed by: INTERNAL MEDICINE

## 2023-06-28 RX ORDER — CARVEDILOL 3.12 MG/1
3.12 TABLET ORAL 2 TIMES DAILY
Qty: 180 TABLET | Refills: 1 | Status: SHIPPED | OUTPATIENT
Start: 2023-06-28

## 2023-06-28 RX ORDER — FUROSEMIDE 40 MG/1
TABLET ORAL
Qty: 90 TABLET | Refills: 1 | Status: SHIPPED | OUTPATIENT
Start: 2023-06-28

## 2023-06-28 RX ORDER — ATORVASTATIN CALCIUM 40 MG/1
40 TABLET, FILM COATED ORAL DAILY
Qty: 90 TABLET | Refills: 3 | Status: SHIPPED | OUTPATIENT
Start: 2023-06-28

## 2023-06-29 ASSESSMENT — ENCOUNTER SYMPTOMS
CHEST TIGHTNESS: 0
SHORTNESS OF BREATH: 0

## 2023-07-10 ENCOUNTER — PROCEDURE VISIT (OUTPATIENT)
Dept: AUDIOLOGY | Age: 58
End: 2023-07-10
Payer: COMMERCIAL

## 2023-07-10 ENCOUNTER — OFFICE VISIT (OUTPATIENT)
Dept: ENT CLINIC | Age: 58
End: 2023-07-10
Payer: COMMERCIAL

## 2023-07-10 VITALS
WEIGHT: 249 LBS | DIASTOLIC BLOOD PRESSURE: 72 MMHG | HEART RATE: 70 BPM | HEIGHT: 72 IN | BODY MASS INDEX: 33.72 KG/M2 | OXYGEN SATURATION: 97 % | TEMPERATURE: 97.8 F | SYSTOLIC BLOOD PRESSURE: 109 MMHG

## 2023-07-10 DIAGNOSIS — R42 DIZZINESS: ICD-10-CM

## 2023-07-10 DIAGNOSIS — G51.0 FACIAL NERVE PARESIS: ICD-10-CM

## 2023-07-10 DIAGNOSIS — H91.8X9 ASYMMETRICAL HEARING LOSS: Primary | ICD-10-CM

## 2023-07-10 DIAGNOSIS — H90.3 ASYMMETRIC SNHL (SENSORINEURAL HEARING LOSS): Primary | ICD-10-CM

## 2023-07-10 DIAGNOSIS — J30.2 SEASONAL ALLERGIES: ICD-10-CM

## 2023-07-10 PROCEDURE — 99203 OFFICE O/P NEW LOW 30 MIN: CPT | Performed by: STUDENT IN AN ORGANIZED HEALTH CARE EDUCATION/TRAINING PROGRAM

## 2023-07-10 PROCEDURE — 92557 COMPREHENSIVE HEARING TEST: CPT

## 2023-07-10 PROCEDURE — 92567 TYMPANOMETRY: CPT
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
## 2023-07-25 ENCOUNTER — HOSPITAL ENCOUNTER (OUTPATIENT)
Dept: MRI IMAGING | Age: 58
Discharge: HOME | End: 2023-07-25
Attending: STUDENT IN AN ORGANIZED HEALTH CARE EDUCATION/TRAINING PROGRAM
Payer: COMMERCIAL

## 2023-07-25 DIAGNOSIS — H91.8X9 ASYMMETRICAL HEARING LOSS: ICD-10-CM

## 2023-07-25 DIAGNOSIS — G51.0 FACIAL NERVE PARESIS: ICD-10-CM

## 2023-07-25 LAB
ANION GAP SERPL CALCULATED.3IONS-SCNC: 11 MMOL/L (ref 3–16)
BUN SERPL-MCNC: 12 MG/DL (ref 7–20)
CALCIUM SERPL-MCNC: 9.1 MG/DL (ref 8.3–10.6)
CHLORIDE SERPL-SCNC: 101 MMOL/L (ref 99–110)
CO2 SERPL-SCNC: 26 MMOL/L (ref 21–32)
CREAT SERPL-MCNC: 0.7 MG/DL (ref 0.8–1.3)
GFR SERPLBLD CREATININE-BSD FMLA CKD-EPI: >60 ML/MIN/{1.73_M2}
GLUCOSE SERPL-MCNC: 129 MG/DL (ref 70–99)
POTASSIUM SERPL-SCNC: 4.1 MMOL/L (ref 3.5–5.1)
SODIUM SERPL-SCNC: 138 MMOL/L (ref 136–145)

## 2023-07-25 PROCEDURE — 80048 BASIC METABOLIC PNL TOTAL CA: CPT

## 2023-07-25 PROCEDURE — A9577 INJ MULTIHANCE: HCPCS | Performed by: STUDENT IN AN ORGANIZED HEALTH CARE EDUCATION/TRAINING PROGRAM

## 2023-07-25 PROCEDURE — 70553 MRI BRAIN STEM W/O & W/DYE: CPT

## 2023-07-25 PROCEDURE — 6360000004 HC RX CONTRAST MEDICATION: Performed by: STUDENT IN AN ORGANIZED HEALTH CARE EDUCATION/TRAINING PROGRAM

## 2023-07-25 PROCEDURE — 36415 COLL VENOUS BLD VENIPUNCTURE: CPT

## 2023-07-25 RX ADMIN — GADOBENATE DIMEGLUMINE 20 ML: 529 INJECTION, SOLUTION INTRAVENOUS at 10:10

## 2023-08-14 RX ORDER — PANTOPRAZOLE SODIUM 40 MG/1
TABLET, DELAYED RELEASE ORAL
Qty: 90 TABLET | Refills: 3 | Status: SHIPPED | OUTPATIENT
Start: 2023-08-14

## 2023-08-16 ENCOUNTER — PATIENT MESSAGE (OUTPATIENT)
Dept: CARDIOLOGY CLINIC | Age: 58
End: 2023-08-16

## 2023-08-17 NOTE — TELEPHONE ENCOUNTER
From: Mona Garcia  To: Dr. Gonzalo Martinez: 8/16/2023 8:47 AM EDT  Subject: baby aspirin    Hello Dr Kirk Vick, I have decided to stop taking the baby aspirin. Ja Fothergill Ja Fothergibrittany I wasn't aware of all the side affects caused from this daily medication. I bleed way to easily now and I am noticing that I bruise extremely easy now as well. This has me very concerned. 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 box for one minute.

## 2023-08-17 NOTE — TELEPHONE ENCOUNTER
Per Dr. Jennifer Quijano See if he will try reducing to 3x week. Otherwise, we can discuss at next OV (due Dec/Jan). I spoke to Mr. Ramandeep Bowen. He is very hesitant to take it at all. I explained the reason for being on it (he has mild CAD). He would like to discuss more at next OV. He also questioned the carvedilol, and I explained the CV benefits as well as it does not cause bruising.

## 2023-08-22 ENCOUNTER — OFFICE VISIT (OUTPATIENT)
Dept: ENT CLINIC | Age: 58
End: 2023-08-22
Payer: COMMERCIAL

## 2023-08-22 VITALS
TEMPERATURE: 97.7 F | DIASTOLIC BLOOD PRESSURE: 82 MMHG | OXYGEN SATURATION: 97 % | HEIGHT: 72 IN | WEIGHT: 245 LBS | SYSTOLIC BLOOD PRESSURE: 132 MMHG | HEART RATE: 65 BPM | BODY MASS INDEX: 33.18 KG/M2

## 2023-08-22 DIAGNOSIS — G51.0 FACIAL NERVE PARESIS: ICD-10-CM

## 2023-08-22 DIAGNOSIS — J30.2 SEASONAL ALLERGIES: ICD-10-CM

## 2023-08-22 DIAGNOSIS — H91.8X9 ASYMMETRICAL HEARING LOSS: Primary | ICD-10-CM

## 2023-08-22 PROCEDURE — 99213 OFFICE O/P EST LOW 20 MIN: CPT | Performed by: STUDENT IN AN ORGANIZED HEALTH CARE EDUCATION/TRAINING PROGRAM

## 2023-08-28 RX ORDER — CARVEDILOL 3.12 MG/1
3.12 TABLET ORAL 2 TIMES DAILY
Qty: 180 TABLET | Refills: 1 | Status: SHIPPED | OUTPATIENT
Start: 2023-08-28

## 2023-08-28 RX ORDER — ATORVASTATIN CALCIUM 40 MG/1
40 TABLET, FILM COATED ORAL DAILY
Qty: 90 TABLET | Refills: 3 | Status: SHIPPED | OUTPATIENT
Start: 2023-08-28

## 2023-08-28 NOTE — TELEPHONE ENCOUNTER
Received refill request for Carvedilol 3.125mg tabs and Atorvastatin Calcium 40mg tabs from [facility]. Last OV: 2023 DKW    Next OV: None.     Last Labs: 2023 Lipid    Last EK-     Last Filled:  2023 DKW

## 2023-08-29 ENCOUNTER — PATIENT MESSAGE (OUTPATIENT)
Dept: CARDIOLOGY CLINIC | Age: 58
End: 2023-08-29

## 2023-08-30 NOTE — TELEPHONE ENCOUNTER
From: Julio Garcia  To: Dr. Jl Winter: 8/29/2023 5:13 PM EDT  Subject: NEW Rx    Hello, I would like to move ALL of [facility] from [facility] ([address]) to [facility] order. Their FAX # is: [phone]. They have recently sent requests for the ATORVASTATIN and are waiting for a response.   They will also be asking for the Carvedilol Rx to be sent whenever it is due so pls switch over my preference to [facility] order instead of [facility].  Thank you   Anurag Jacob

## 2023-09-01 RX ORDER — FUROSEMIDE 40 MG/1
TABLET ORAL
Qty: 90 TABLET | Refills: 1 | Status: SHIPPED | OUTPATIENT
Start: 2023-09-01

## 2023-11-09 ENCOUNTER — PATIENT MESSAGE (OUTPATIENT)
Dept: FAMILY MEDICINE CLINIC | Age: 58
End: 2023-11-09

## 2023-11-09 DIAGNOSIS — M17.0 PRIMARY OSTEOARTHRITIS OF BOTH KNEES: ICD-10-CM

## 2023-11-09 RX ORDER — ATORVASTATIN CALCIUM 40 MG/1
40 TABLET, FILM COATED ORAL DAILY
Qty: 90 TABLET | Refills: 1 | Status: SHIPPED | OUTPATIENT
Start: 2023-11-09

## 2023-11-09 RX ORDER — FUROSEMIDE 40 MG/1
TABLET ORAL
Qty: 90 TABLET | Refills: 1 | Status: SHIPPED | OUTPATIENT
Start: 2023-11-09

## 2023-11-09 RX ORDER — PANTOPRAZOLE SODIUM 40 MG/1
40 TABLET, DELAYED RELEASE ORAL DAILY
Qty: 90 TABLET | Refills: 1 | Status: SHIPPED | OUTPATIENT
Start: 2023-11-09

## 2023-11-09 RX ORDER — CARVEDILOL 3.12 MG/1
3.12 TABLET ORAL 2 TIMES DAILY
Qty: 180 TABLET | Refills: 1 | Status: SHIPPED | OUTPATIENT
Start: 2023-11-09

## 2023-11-09 NOTE — TELEPHONE ENCOUNTER
Medication:   Requested Prescriptions     Pending Prescriptions Disp Refills    diclofenac sodium (VOLTAREN) 1 % GEL 64 g 5     Sig: Apply up to 4 times daily as needed for pain, #4 tubes, 5 RF      Last Filled:  5/31/2023    Patient Phone Number: [phone] (home)     Last appt: 5/31/2023   Next appt: Visit date not found    Last OARRS:       11/13/2022     7:17 PM   RX Monitoring   Acute Pain Prescriptions Not required given exclusionary diagnoses...     PDMP Monitoring:    Last PDMP Jeff as Reviewed ([place]):  Review User Review Instant Review Result   CIRO FLEMING 5/31/2023  8:22 AM Reviewed PDMP [1]     Preferred Pharmacy:   [facility] - [place], [place] - [address] - P [phone] - F [phone]  [address]  [address]  [facility] [address]  Phone: [phone] Fax: [phone]

## 2023-11-09 NOTE — TELEPHONE ENCOUNTER
From: Danielle Garcia  To:  Robin Welsh  Sent: 11/9/2023 12:44 PM EST  Subject: [facility] order instead of [facility]    Please send my Diclofenac (and all medicines) request to [phone] mail orders as I quit using [facility].

## 2023-11-10 ENCOUNTER — TELEPHONE (OUTPATIENT)
Dept: CARDIOLOGY CLINIC | Age: 58
End: 2023-11-10

## 2023-11-10 NOTE — TELEPHONE ENCOUNTER
Phoned patient and schedule az requested appointment w/ DKW in [facility] for 11/13/23  at 9:30.   Pt v/u

## 2023-11-27 ENCOUNTER — OFFICE VISIT (OUTPATIENT)
Dept: VASCULAR SURGERY | Age: 58
End: 2023-11-27
Payer: COMMERCIAL

## 2023-11-27 VITALS — BODY MASS INDEX: 34.54 KG/M2 | HEIGHT: 72 IN | WEIGHT: 255 LBS

## 2023-11-27 DIAGNOSIS — I87.2 VENOUS INSUFFICIENCY: Primary | ICD-10-CM

## 2023-11-27 DIAGNOSIS — G57.52 TARSAL TUNNEL SYNDROME OF LEFT SIDE: ICD-10-CM

## 2023-11-27 DIAGNOSIS — R60.0 BILATERAL LOWER EXTREMITY EDEMA: ICD-10-CM

## 2023-11-27 PROCEDURE — 99214 OFFICE O/P EST MOD 30 MIN: CPT | Performed by: SURGERY

## 2023-11-27 ASSESSMENT — ENCOUNTER SYMPTOMS
COUGH: 1
GASTROINTESTINAL NEGATIVE: 1
SHORTNESS OF BREATH: 1
BACK PAIN: 1
EYES NEGATIVE: 1

## 2023-11-27 NOTE — PROGRESS NOTES
Zurdo Orona (:  1965) is a 59 y.o. male, here for evaluation of the following chief complaint(s):  New Patient (Former patient of Dr. Jeanette Mcgill, varicose veins in both legs, has pain in his left foot that is getting worse)      Subjective   SUBJECTIVE/OBJECTIVE:  HPI  Mr. Dionicio Vo is a 78-year-old male who presents for evaluation of varicose veins. Patient is a prior patient of Dr. Jeanette Mcgill is noticing the doctor Dr. Jeanette Mcgill left the practice. Patient states he was initially diagnosed with left tarsal tunnel syndrome by Dr. Nicole Renteria who recommended evaluation by us for treatment of his venous insufficiency and varicose veins. Patient has been tolerating compression and has been doing well. He is in need of a new compression stockings. He states over the past few weeks to months his symptoms of tarsal tunnel have started to return even though his swelling remains much improved. Patient is otherwise been well and has had no issues. He denies any bleeding issues or new varicose veins. Patient only complaining of pain and intermittent numbness in his left foot. Review of Systems   Constitutional:  Positive for fatigue. HENT: Negative. Eyes: Negative. Respiratory:  Positive for cough and shortness of breath. Cardiovascular:  Positive for leg swelling. Gastrointestinal: Negative. Endocrine: Negative. Genitourinary: Negative. Musculoskeletal:  Positive for arthralgias, back pain and neck pain. Skin: Negative. Allergic/Immunologic: Positive for environmental allergies and food allergies. Neurological: Negative. Hematological: Negative. Psychiatric/Behavioral: Negative. Objective   Physical Exam  Vitals and nursing note reviewed. Constitutional:       General: He is not in acute distress. Appearance: Normal appearance. HENT:      Head: Normocephalic and atraumatic. Cardiovascular:      Rate and Rhythm: Normal rate and regular rhythm.

## 2023-12-06 ENCOUNTER — TELEPHONE (OUTPATIENT)
Dept: ORTHOPEDIC SURGERY | Age: 58
End: 2023-12-06

## 2023-12-06 ENCOUNTER — OFFICE VISIT (OUTPATIENT)
Dept: ORTHOPEDIC SURGERY | Age: 58
End: 2023-12-06
Payer: COMMERCIAL

## 2023-12-06 VITALS — BODY MASS INDEX: 34.55 KG/M2 | HEIGHT: 72 IN | WEIGHT: 255.07 LBS

## 2023-12-06 DIAGNOSIS — G57.52 TARSAL TUNNEL SYNDROME, LEFT: ICD-10-CM

## 2023-12-06 DIAGNOSIS — M79.672 LEFT FOOT PAIN: Primary | ICD-10-CM

## 2023-12-06 PROCEDURE — 99214 OFFICE O/P EST MOD 30 MIN: CPT | Performed by: ORTHOPAEDIC SURGERY
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
## 2023-12-06 NOTE — TELEPHONE ENCOUNTER
General Question     Subject: L FOOT EMG REFERRAL   Patient and /or Facility Request: Christiana Avendaño  Contact Number: [phone]    PATIENT CALLED IN TO SEE IF THE OFFICE CAN FAX OVER HIS EMG REFERRAL. ..      EMG TEST LOCATED IN [place]   [phone]    PATIENT APPT IS ON DECEMBER 20TH AT 7 AM     PLEASE ADVISE

## 2023-12-07 ENCOUNTER — TELEPHONE (OUTPATIENT)
Dept: CARDIOLOGY CLINIC | Age: 58
End: 2023-12-07

## 2023-12-07 NOTE — TELEPHONE ENCOUNTER
Pt called to see if he will be cleared to donate blood at [facility], at his work.   MA taken the call

## 2023-12-08 ENCOUNTER — TELEPHONE (OUTPATIENT)
Dept: ORTHOPEDIC SURGERY | Age: 58
End: 2023-12-08

## 2023-12-08 NOTE — TELEPHONE ENCOUNTER
Patient has appt on Tuesday. I told him this will be done on Monday   [facility] [facility].

## 2023-12-08 NOTE — TELEPHONE ENCOUNTER
Prescription Refill     Medication Name:  ANTIBIOTIC  Pharmacy: [facility] ON [address]  Patient Contact Number:  [phone]    THIS IS A FORMER PT OF DR GORDON, AND HE NEEDS AN ANTIBIOTIC SO THAT HE CAN GO TO A DENTIST

## 2023-12-11 DIAGNOSIS — G57.52 TARSAL TUNNEL SYNDROME, LEFT: Primary | ICD-10-CM

## 2023-12-11 RX ORDER — CEPHALEXIN 500 MG/1
CAPSULE ORAL
Qty: 8 CAPSULE | Refills: 1 | Status: SHIPPED | OUTPATIENT
Start: 2023-12-11

## 2023-12-15 ENCOUNTER — PATIENT MESSAGE (OUTPATIENT)
Dept: FAMILY MEDICINE CLINIC | Age: 58
End: 2023-12-15

## 2023-12-15 NOTE — TELEPHONE ENCOUNTER
From: Olimpia Garcia  To: Francisco J Suh  Sent: 12/15/2023 10:13 AM EST  Subject: records for wellness points    Good morning, I need to send a printed copy via email to my Wellness Provider in order to get my points/money. Can you pls provide me a copy of my yearly physical that I did in late Spring (late May or early June?)  Can you also pls include ALL of my biometrics that was ordered by Dr Crystal Naylor for my physical(s) so that I can submit those as well.

## 2023-12-20 ENCOUNTER — TELEPHONE (OUTPATIENT)
Dept: ORTHOPEDIC SURGERY | Age: 58
End: 2023-12-20

## 2023-12-20 NOTE — TELEPHONE ENCOUNTER
Pt. Getting EMG at [facility] on 12-. Pt. States you told him you would get him in for surgery either Friday or next week since he will be having the EMG done tomorrow.

## 2023-12-20 NOTE — TELEPHONE ENCOUNTER
Left voicemail to return phone call concerning the EMG. It takes about a week for EMGs to be read and a report to be sent. Patient can be scheduled for test results 12/28/23. We will need to monitor for receipt of results due to possible delay due to the holiday.

## 2023-12-20 NOTE — TELEPHONE ENCOUNTER
Surgery and/or Procedure Scheduling     Contact Name: Ronda Lashay Request: L FOOT  Patient Contact Number: [phone]    PATIENT STATES THAT HIS MRI IS SCHEDULED FOR 12/21/23 AND HE WOULD LIKE TO SCHEDULE SX

## 2023-12-20 NOTE — TELEPHONE ENCOUNTER
General Question     Subject: RETURNING CALL   Patient and /or Facility Request: Christiana Avendaño  Contact Number: [phone]    PATIENT CALLING IN AGAIN REQUESTING A CALL BACK FROM [facility]OYA IN DR PURI'S OFFICE     PLEASE CALL THE PATIENT BACK AT THE ABOVE NUMBER

## 2023-12-20 NOTE — TELEPHONE ENCOUNTER
Patient states he will request that [facility] read his EMG ASAP. I Ruperto Gallagher)   Will look out for any EMG TR on Friday at [facility] till 2 pm. Patient was made aware. Will call the patient on Friday letting him know if the results were received or not. Patient is was also made aware that Dr. Angélica De La Torre is on call this weekend and next week our schedule is booked due to surgery and clinic (half days). Patient has appointment on Friday 12/29/2023 at [facility].      Check for EMG TR on FRIDAY at [facility]

## 2023-12-22 NOTE — TELEPHONE ENCOUNTER
No answer. and Left message with details. Yue Stroud will be in the office on Tuesday and will be on the look out for the results.

## 2023-12-26 ENCOUNTER — TELEPHONE (OUTPATIENT)
Dept: ORTHOPEDIC SURGERY | Age: 58
End: 2023-12-26

## 2023-12-26 ENCOUNTER — OFFICE VISIT (OUTPATIENT)
Dept: ORTHOPEDIC SURGERY | Age: 58
End: 2023-12-26
Payer: COMMERCIAL

## 2023-12-26 VITALS — WEIGHT: 255 LBS | HEIGHT: 72 IN | BODY MASS INDEX: 34.54 KG/M2

## 2023-12-26 DIAGNOSIS — G57.52 TARSAL TUNNEL SYNDROME, LEFT: Primary | ICD-10-CM

## 2023-12-26 PROCEDURE — 99214 OFFICE O/P EST MOD 30 MIN: CPT | Performed by: ORTHOPAEDIC SURGERY

## 2023-12-26 NOTE — TELEPHONE ENCOUNTER
OTHER    PATIENT CALLED TO INFORM HE WAS AT WRONG LOCATION. HE IS CURRENTLY ON WAY TO [facility].      PLEASE ADVISE
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
## 2023-12-26 NOTE — TELEPHONE ENCOUNTER
DIANN for patient letting him know that I have his EMG results. I will discuss with Dr. Jefferson Novoa if he is willing to see the patient today and schedule surgery if needed.

## 2023-12-27 ENCOUNTER — TELEPHONE (OUTPATIENT)
Dept: ORTHOPEDIC SURGERY | Age: 58
End: 2023-12-27

## 2023-12-27 NOTE — TELEPHONE ENCOUNTER
Auth: [employer]  Date: 12/28/23 thru 03/27/24  Reference # [id number]  Spoke with: Online  Type of SX: Outpatient  Location: [facility]  CPT: 29178   DX: G57.52  SX area:  Lt wrist  Insurance: [employer]

## 2023-12-27 NOTE — PROGRESS NOTES
Patient not reached. Preop instructions left on voice mail. Number_______________    -Date__12/28/23_____time__0800_____arrival___0630 masc_________  -Nothing to eat or drink after midnight  -Responsible adult 25 or older to stay on site while you are here and drive you home and stay with you after  -Follow any instructions your doctors office has given you  -Bring a complete list of all your medications and supplements  -If you normally take the following medications in the morning please do so with a small    sip of water-heart,blood pressure,seizure,breathing or thyroid-avoid water pilll Do not take blood pressure medications ending in \"chanatl\" or \"pril\" the AM of surgery or the leeann prior  -You may use your inhalers  -Take half of your normal dose of any long acting insulins the night before-do not take    any diabetic medications in the morning  -Follow your doctors instructions regarding blood thinners  -Any questions call your surgeons office            VISITOR POLICY(subject to change)    Current policy is 2 visitors per patient. No children. Masks at discretion of facility. Visiting hours are 8a-8p. Overnight visitors will be at the discretion of the nurse. All policies are subject to change.

## 2023-12-28 ENCOUNTER — HOSPITAL ENCOUNTER (OUTPATIENT)
Age: 58
Setting detail: OUTPATIENT SURGERY
Discharge: HOME | End: 2023-12-28
Attending: ORTHOPAEDIC SURGERY | Admitting: ORTHOPAEDIC SURGERY
Payer: COMMERCIAL

## 2023-12-28 ENCOUNTER — ANESTHESIA EVENT (OUTPATIENT)
Dept: OPERATING ROOM | Age: 58
End: 2023-12-28
Payer: COMMERCIAL

## 2023-12-28 ENCOUNTER — ANESTHESIA (OUTPATIENT)
Dept: OPERATING ROOM | Age: 58
End: 2023-12-28
Payer: COMMERCIAL

## 2023-12-28 VITALS
SYSTOLIC BLOOD PRESSURE: 122 MMHG | RESPIRATION RATE: 15 BRPM | HEIGHT: 72 IN | OXYGEN SATURATION: 97 % | BODY MASS INDEX: 33.86 KG/M2 | WEIGHT: 250 LBS | DIASTOLIC BLOOD PRESSURE: 86 MMHG | HEART RATE: 59 BPM | TEMPERATURE: 97.2 F

## 2023-12-28 DIAGNOSIS — G57.52 TARSAL TUNNEL SYNDROME, LEFT: Primary | ICD-10-CM

## 2023-12-28 PROCEDURE — 6360000002 HC RX W HCPCS: Performed by: ANESTHESIOLOGY

## 2023-12-28 PROCEDURE — 7100000001 HC PACU RECOVERY - ADDTL 15 MIN: Performed by: ORTHOPAEDIC SURGERY

## 2023-12-28 PROCEDURE — 3600000013 HC SURGERY LEVEL 3 ADDTL 15MIN: Performed by: ORTHOPAEDIC SURGERY

## 2023-12-28 PROCEDURE — 6360000002 HC RX W HCPCS: Performed by: ORTHOPAEDIC SURGERY

## 2023-12-28 PROCEDURE — 6360000002 HC RX W HCPCS: Performed by: NURSE ANESTHETIST, CERTIFIED REGISTERED

## 2023-12-28 PROCEDURE — 3700000000 HC ANESTHESIA ATTENDED CARE: Performed by: ORTHOPAEDIC SURGERY

## 2023-12-28 PROCEDURE — A4217 STERILE WATER/SALINE, 500 ML: HCPCS | Performed by: ORTHOPAEDIC SURGERY

## 2023-12-28 PROCEDURE — 7100000011 HC PHASE II RECOVERY - ADDTL 15 MIN: Performed by: ORTHOPAEDIC SURGERY

## 2023-12-28 PROCEDURE — 7100000000 HC PACU RECOVERY - FIRST 15 MIN: Performed by: ORTHOPAEDIC SURGERY

## 2023-12-28 PROCEDURE — 28035 DECOMPRESSION OF TIBIA NERVE: CPT | Performed by: ORTHOPAEDIC SURGERY

## 2023-12-28 PROCEDURE — 28035 DECOMPRESSION OF TIBIA NERVE: CPT | Performed by: NURSE PRACTITIONER

## 2023-12-28 PROCEDURE — 3600000003 HC SURGERY LEVEL 3 BASE: Performed by: ORTHOPAEDIC SURGERY

## 2023-12-28 PROCEDURE — 6370000000 HC RX 637 (ALT 250 FOR IP): Performed by: ANESTHESIOLOGY

## 2023-12-28 PROCEDURE — 2709999900 HC NON-CHARGEABLE SUPPLY: Performed by: ORTHOPAEDIC SURGERY

## 2023-12-28 PROCEDURE — 2500000003 HC RX 250 WO HCPCS: Performed by: NURSE ANESTHETIST, CERTIFIED REGISTERED

## 2023-12-28 PROCEDURE — 3700000001 HC ADD 15 MINUTES (ANESTHESIA): Performed by: ORTHOPAEDIC SURGERY

## 2023-12-28 PROCEDURE — 2580000003 HC RX 258: Performed by: ORTHOPAEDIC SURGERY

## 2023-12-28 PROCEDURE — 7100000010 HC PHASE II RECOVERY - FIRST 15 MIN: Performed by: ORTHOPAEDIC SURGERY

## 2023-12-28 RX ORDER — ONDANSETRON 2 MG/ML
4 INJECTION INTRAMUSCULAR; INTRAVENOUS
Status: DISCONTINUED | OUTPATIENT
Start: 2023-12-28 | End: 2023-12-28 | Stop reason: HOSPADM

## 2023-12-28 RX ORDER — LIDOCAINE HYDROCHLORIDE 20 MG/ML
INJECTION, SOLUTION EPIDURAL; INFILTRATION; INTRACAUDAL; PERINEURAL PRN
Status: DISCONTINUED | OUTPATIENT
Start: 2023-12-28 | End: 2023-12-28 | Stop reason: SDUPTHER

## 2023-12-28 RX ORDER — HYDROMORPHONE HYDROCHLORIDE 2 MG/ML
0.25 INJECTION, SOLUTION INTRAMUSCULAR; INTRAVENOUS; SUBCUTANEOUS EVERY 5 MIN PRN
Status: DISCONTINUED | OUTPATIENT
Start: 2023-12-28 | End: 2023-12-28 | Stop reason: HOSPADM

## 2023-12-28 RX ORDER — SODIUM CHLORIDE 9 MG/ML
INJECTION, SOLUTION INTRAVENOUS PRN
Status: DISCONTINUED | OUTPATIENT
Start: 2023-12-28 | End: 2023-12-28 | Stop reason: HOSPADM

## 2023-12-28 RX ORDER — LABETALOL HYDROCHLORIDE 5 MG/ML
5 INJECTION, SOLUTION INTRAVENOUS
Status: DISCONTINUED | OUTPATIENT
Start: 2023-12-28 | End: 2023-12-28 | Stop reason: HOSPADM

## 2023-12-28 RX ORDER — LIDOCAINE HYDROCHLORIDE 10 MG/ML
1 INJECTION, SOLUTION EPIDURAL; INFILTRATION; INTRACAUDAL; PERINEURAL
Status: DISCONTINUED | OUTPATIENT
Start: 2023-12-28 | End: 2023-12-28 | Stop reason: HOSPADM

## 2023-12-28 RX ORDER — SODIUM CHLORIDE 0.9 % (FLUSH) 0.9 %
5-40 SYRINGE (ML) INJECTION EVERY 12 HOURS SCHEDULED
Status: DISCONTINUED | OUTPATIENT
Start: 2023-12-28 | End: 2023-12-28 | Stop reason: HOSPADM

## 2023-12-28 RX ORDER — SODIUM CHLORIDE 9 MG/ML
INJECTION, SOLUTION INTRAVENOUS CONTINUOUS
Status: DISCONTINUED | OUTPATIENT
Start: 2023-12-28 | End: 2023-12-28 | Stop reason: HOSPADM

## 2023-12-28 RX ORDER — ACETAMINOPHEN 325 MG/1
650 TABLET ORAL ONCE
Status: COMPLETED | OUTPATIENT
Start: 2023-12-28 | End: 2023-12-28

## 2023-12-28 RX ORDER — SODIUM CHLORIDE 0.9 % (FLUSH) 0.9 %
5-40 SYRINGE (ML) INJECTION PRN
Status: DISCONTINUED | OUTPATIENT
Start: 2023-12-28 | End: 2023-12-28 | Stop reason: HOSPADM

## 2023-12-28 RX ORDER — PROPOFOL 10 MG/ML
INJECTION, EMULSION INTRAVENOUS PRN
Status: DISCONTINUED | OUTPATIENT
Start: 2023-12-28 | End: 2023-12-28 | Stop reason: SDUPTHER

## 2023-12-28 RX ORDER — DEXAMETHASONE SODIUM PHOSPHATE 4 MG/ML
INJECTION, SOLUTION INTRA-ARTICULAR; INTRALESIONAL; INTRAMUSCULAR; INTRAVENOUS; SOFT TISSUE PRN
Status: DISCONTINUED | OUTPATIENT
Start: 2023-12-28 | End: 2023-12-28 | Stop reason: SDUPTHER

## 2023-12-28 RX ORDER — HYDROMORPHONE HYDROCHLORIDE 2 MG/ML
0.5 INJECTION, SOLUTION INTRAMUSCULAR; INTRAVENOUS; SUBCUTANEOUS EVERY 5 MIN PRN
Status: DISCONTINUED | OUTPATIENT
Start: 2023-12-28 | End: 2023-12-28 | Stop reason: HOSPADM

## 2023-12-28 RX ORDER — DEXMEDETOMIDINE HYDROCHLORIDE 100 UG/ML
INJECTION, SOLUTION INTRAVENOUS PRN
Status: DISCONTINUED | OUTPATIENT
Start: 2023-12-28 | End: 2023-12-28 | Stop reason: SDUPTHER

## 2023-12-28 RX ORDER — FENTANYL CITRATE 50 UG/ML
INJECTION, SOLUTION INTRAMUSCULAR; INTRAVENOUS PRN
Status: DISCONTINUED | OUTPATIENT
Start: 2023-12-28 | End: 2023-12-28 | Stop reason: SDUPTHER

## 2023-12-28 RX ORDER — TRAMADOL HYDROCHLORIDE 50 MG/1
50 TABLET ORAL EVERY 6 HOURS PRN
Qty: 12 TABLET | Refills: 0 | Status: SHIPPED | OUTPATIENT
Start: 2023-12-28 | End: 2023-12-31

## 2023-12-28 RX ORDER — ONDANSETRON 2 MG/ML
INJECTION INTRAMUSCULAR; INTRAVENOUS PRN
Status: DISCONTINUED | OUTPATIENT
Start: 2023-12-28 | End: 2023-12-28 | Stop reason: SDUPTHER

## 2023-12-28 RX ORDER — MIDAZOLAM HYDROCHLORIDE 1 MG/ML
INJECTION INTRAMUSCULAR; INTRAVENOUS PRN
Status: DISCONTINUED | OUTPATIENT
Start: 2023-12-28 | End: 2023-12-28 | Stop reason: SDUPTHER

## 2023-12-28 RX ORDER — CEPHALEXIN 500 MG/1
500 CAPSULE ORAL 4 TIMES DAILY
Qty: 20 CAPSULE | Refills: 0 | Status: SHIPPED | OUTPATIENT
Start: 2023-12-28 | End: 2024-01-02

## 2023-12-28 RX ORDER — OXYCODONE HYDROCHLORIDE 5 MG/1
5 TABLET ORAL
Status: COMPLETED | OUTPATIENT
Start: 2023-12-28 | End: 2023-12-28

## 2023-12-28 RX ADMIN — SODIUM CHLORIDE: 9 INJECTION, SOLUTION INTRAVENOUS at 09:34

## 2023-12-28 RX ADMIN — DEXMEDETOMIDINE HYDROCHLORIDE 8 MCG: 100 INJECTION, SOLUTION INTRAVENOUS at 09:34

## 2023-12-28 RX ADMIN — DEXMEDETOMIDINE HYDROCHLORIDE 4 MCG: 100 INJECTION, SOLUTION INTRAVENOUS at 09:30

## 2023-12-28 RX ADMIN — DEXAMETHASONE SODIUM PHOSPHATE 8 MG: 4 INJECTION, SOLUTION INTRAMUSCULAR; INTRAVENOUS at 09:00

## 2023-12-28 RX ADMIN — OXYCODONE HYDROCHLORIDE 5 MG: 5 TABLET ORAL at 10:47

## 2023-12-28 RX ADMIN — FENTANYL CITRATE 50 MCG: 50 INJECTION, SOLUTION INTRAMUSCULAR; INTRAVENOUS at 09:00

## 2023-12-28 RX ADMIN — MIDAZOLAM 2 MG: 1 INJECTION INTRAMUSCULAR; INTRAVENOUS at 08:52

## 2023-12-28 RX ADMIN — DEXMEDETOMIDINE HYDROCHLORIDE 8 MCG: 100 INJECTION, SOLUTION INTRAVENOUS at 09:42

## 2023-12-28 RX ADMIN — ONDANSETRON 4 MG: 2 INJECTION INTRAMUSCULAR; INTRAVENOUS at 09:00

## 2023-12-28 RX ADMIN — PROPOFOL 150 MG: 10 INJECTION, EMULSION INTRAVENOUS at 08:57

## 2023-12-28 RX ADMIN — ACETAMINOPHEN 650 MG: 325 TABLET ORAL at 07:07

## 2023-12-28 RX ADMIN — SODIUM CHLORIDE: 9 INJECTION, SOLUTION INTRAVENOUS at 07:00

## 2023-12-28 RX ADMIN — CEFAZOLIN 2000 MG: 2 INJECTION, POWDER, FOR SOLUTION INTRAMUSCULAR; INTRAVENOUS at 08:53

## 2023-12-28 RX ADMIN — FENTANYL CITRATE 50 MCG: 50 INJECTION, SOLUTION INTRAMUSCULAR; INTRAVENOUS at 09:05

## 2023-12-28 RX ADMIN — PROPOFOL 50 MG: 10 INJECTION, EMULSION INTRAVENOUS at 09:06

## 2023-12-28 RX ADMIN — HYDROMORPHONE HYDROCHLORIDE 0.5 MG: 2 INJECTION, SOLUTION INTRAMUSCULAR; INTRAVENOUS; SUBCUTANEOUS at 10:04

## 2023-12-28 RX ADMIN — LIDOCAINE HYDROCHLORIDE 100 MG: 20 INJECTION, SOLUTION EPIDURAL; INFILTRATION; INTRACAUDAL; PERINEURAL at 08:57

## 2023-12-28 NOTE — PROGRESS NOTES
Pt awake and alert at this time. Pt on RA, and VSS. Pt denies pain and nausea, tolerating PO. Skin warm , palpable pulses and able to wiggle toes. Pt meets criteria to be discharged from Phase 1.

## 2023-12-28 NOTE — DISCHARGE INSTRUCTIONS
Post op instruction:  1- D/C home  2- Dx Left posterior-medial ankle pain with numbness and tingling/tarsal tunnel  3- WBAT left ankle. 4- Elevation surgical site, with ice  5- Keep Drsg dry and clean, 3 days, then BandAid. 6- F/U in 2 weeks. 7- For DVT prophylaxis- Aspirin 325 mg daily     Ivory Bueno MD, 12/28/2023        GENERAL SURGERY DISCHARGE INSTRUCTIONS    Follow your surgeons instructions. Follow up with your surgeon as directed. Observe the operative area for signs of excessive bleeding. If needed apply pressure,elevate if able and contact your surgeon. Observe the operative site for any signs of infection- such as increased pain,redness,fever greater than 101 degrees,swelling, foul odor or drainage. Contact your surgeon if any of these symptoms are present. Keep operative site clean and dry. Do not remove dressing unless instructed to by surgeon. Apply ice as directed. If unable to urinate once you are at home,  notify your surgeon or go to the Emergency Room. Avoid pulling,pushing or tugging to suture line. If you become short of breath call your doctor or go to the ER. Take medications as directed. Pain medication should be taken with food. Do not drive or operate machinery while taking narcotics. For any problems or question call your surgeon. ANESTHESIA DISCHARGE INSTRUCTIONS    Wear your seatbelt home. You are under the influence of drugs-do not drink alcohol, drive, operate machinery, make any important decisions or sign any legal documents for 24 hours. Children should not ride bikes, [place] or play on gym sets for 24 hours after surgery. A responsible adult needs to be with you for 24 hours. You may experience lightheadedness, dizziness, or sleepiness following surgery. Rest at home today- increase activity as tolerated. Progress slowly to a regular diet unless your physician has instructed you otherwise. Drink plenty of water.   If persistent nausea and vomiting

## 2023-12-28 NOTE — PROGRESS NOTES
Discharge instructions review with patient and wife. All home medications have been reviewed, pt v/u. Discharge instructions signed. Pt discharged via wheelchair. Pt discharged with  all belongings. Jillian Moreno taking stable pt home.
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
## 2023-12-28 NOTE — PROGRESS NOTES
Pt arrived from OR to PACU bay 4. Reported received from is OR, RN/ CRNA staff. Pt is not arousable to voice. Surgical incisions dressings in place to left ankle. Pt on RA, NSR, and VSS. Will continue to monitor.
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
## 2024-01-11 ENCOUNTER — OFFICE VISIT (OUTPATIENT)
Dept: ORTHOPEDIC SURGERY | Age: 59
End: 2024-01-11

## 2024-01-11 VITALS — BODY MASS INDEX: 33.86 KG/M2 | WEIGHT: 250 LBS | HEIGHT: 72 IN

## 2024-01-11 DIAGNOSIS — G57.52 TARSAL TUNNEL SYNDROME, LEFT: Primary | ICD-10-CM

## 2024-01-11 PROCEDURE — APPNB15 APP NON BILLABLE TIME 0-15 MINS: Performed by: NURSE PRACTITIONER

## 2024-01-11 PROCEDURE — 99024 POSTOP FOLLOW-UP VISIT: CPT | Performed by: NURSE PRACTITIONER

## 2024-01-11 NOTE — PROGRESS NOTES
DIAGNOSIS:  Left foot tarsal tunnel, status post posterior tibial nerve decompression.    DATE OF SURGERY: 12/28/2023.    HISTORY OF PRESENT ILLNESS:  Mr. Garcia 58 y.o.  male who came in today for 2 weeks postoperative visit.  The patient denies any significant pain in the left foot and states his pain is much improved after the surgery, but still has some numbness left sole of foot laterally. He has been WBAT.  No numbness or tingling sensation. No fever or Chills.  He is currently seeing pain management and is on gabapentin.    PHYSICAL EXAMINATION:  The incision healing well .  No signs of any erythema or drainage, minimal swelling. He has no pain with the active or passive range of motion of the left foot and ankle, good ROM.  He has intact sensation distally, and he is neurovascularly intact.    IMPRESSION:  2 weeks out from left foot tarsal tunnel, status post posterior tibial nerve decompression, and doing very well.    PLAN: He can go back to normal activity with no heavy impact activities for 6 weeks.  I discussed with him that it can take up to 18 months for his nerve to fully recover and his numbness and tingling to resolve.  The patient will come back for a follow up in 3 months or as needed.            Ermelinda Virgen, APRN - CNP

## 2024-01-29 ENCOUNTER — PATIENT MESSAGE (OUTPATIENT)
Dept: CARDIOLOGY CLINIC | Age: 59
End: 2024-01-29

## 2024-01-29 RX ORDER — ATORVASTATIN CALCIUM 40 MG/1
40 TABLET, FILM COATED ORAL DAILY
Qty: 90 TABLET | Refills: 0 | Status: SHIPPED | OUTPATIENT
Start: 2024-01-29

## 2024-01-29 NOTE — TELEPHONE ENCOUNTER
From: Vineet Garcia  To: Dr. Mario Loving  Sent: 1/29/2024 1:47 PM EST  Subject: [facility]    last week I called [facility] to renew my cholesterol medicine. I learned that they have changed hands and have a new name. Not sure if they went from [facility] to [facility] or vice-versa or something like that? They told me they would call Dr Vee to get a new prescription. Did they call you aboout a week ago to update their files and give you a new number to call in meds?? I need to know when the new meds will arrive because I will probably run out by Wednesday. I may need a 7 day supply until the new med arrives?

## 2024-02-19 ENCOUNTER — PATIENT MESSAGE (OUTPATIENT)
Dept: FAMILY MEDICINE CLINIC | Age: 59
End: 2024-02-19

## 2024-02-19 ENCOUNTER — PATIENT MESSAGE (OUTPATIENT)
Dept: CARDIOLOGY CLINIC | Age: 59
End: 2024-02-19

## 2024-02-19 DIAGNOSIS — M17.0 PRIMARY OSTEOARTHRITIS OF BOTH KNEES: ICD-10-CM

## 2024-02-19 RX ORDER — PANTOPRAZOLE SODIUM 40 MG/1
40 TABLET, DELAYED RELEASE ORAL DAILY
Qty: 90 TABLET | Refills: 1 | Status: SHIPPED | OUTPATIENT
Start: 2024-02-19 | End: 2024-02-23 | Stop reason: SDUPTHER

## 2024-02-19 NOTE — TELEPHONE ENCOUNTER
From: Vineet Garcia  To: Alannah Gormanjciechowski  Sent: 2/19/2024 10:40 AM EST  Subject: carelon fax [facility].    I believe your FAX number for [facility] may not be up to date? I noticed when I requested Pantoprazole & Diclofinac on your website that you have a different fax number. I called [facility] this morning and they told me to give you this fax #: [phone]. I did notice your phone number is correct at [phone], so just the fax number needs checked pls.  I also forgot to ask [facility] about the Diclofinac. Can you pls ask them to send me a new batch as it's been over a month since they sent any.  Thank you very much  Vineet

## 2024-02-19 NOTE — TELEPHONE ENCOUNTER
From: Vineet Garcia  To: Dr. Mario Loving  Sent: 2/19/2024 10:48 AM EST  Subject: carvedilol    I reached out to [facility]  this morning and they should be asking you for a refill on my Carvedilol 3.125 tablets. If they do not get ahold of you today, can you pls put in a request for a new refill?  Pls check your fax number for them is: [phone].  Thank you  Vineet

## 2024-02-20 RX ORDER — CARVEDILOL 3.12 MG/1
3.12 TABLET ORAL 2 TIMES DAILY
Qty: 180 TABLET | Refills: 3 | Status: SHIPPED | OUTPATIENT
Start: 2024-02-20

## 2024-02-23 ENCOUNTER — PATIENT MESSAGE (OUTPATIENT)
Dept: FAMILY MEDICINE CLINIC | Age: 59
End: 2024-02-23

## 2024-02-23 RX ORDER — PANTOPRAZOLE SODIUM 40 MG/1
40 TABLET, DELAYED RELEASE ORAL DAILY
Qty: 7 TABLET | Refills: 0 | Status: SHIPPED | OUTPATIENT
Start: 2024-02-23

## 2024-02-23 NOTE — TELEPHONE ENCOUNTER
From: Vineet Garcia  To: Alannah Flores  Sent: 2/23/2024 8:50 AM EST  Subject: pantaprozole    Good morning, [facility] is behind on their orders. They said Pantaprozole (spelling?) is on back order. Can you pls send a 7 day Rx to [facility] on [address] [facility]. ([phone]: [address] [address] for my heart burn medicine until [facility] arrives? I called them this morning and they think it will be mailed out on Monday so it probably won't arrive until Wednesday.

## 2024-03-27 ENCOUNTER — OFFICE VISIT (OUTPATIENT)
Dept: ORTHOPEDIC SURGERY | Age: 59
End: 2024-03-27

## 2024-03-27 ENCOUNTER — OFFICE VISIT (OUTPATIENT)
Dept: PULMONOLOGY | Age: 59
End: 2024-03-27
Payer: COMMERCIAL

## 2024-03-27 VITALS — RESPIRATION RATE: 18 BRPM | HEIGHT: 72 IN | BODY MASS INDEX: 34.13 KG/M2 | WEIGHT: 252 LBS

## 2024-03-27 VITALS
OXYGEN SATURATION: 97 % | DIASTOLIC BLOOD PRESSURE: 78 MMHG | BODY MASS INDEX: 34.13 KG/M2 | WEIGHT: 252 LBS | SYSTOLIC BLOOD PRESSURE: 116 MMHG | HEART RATE: 78 BPM | HEIGHT: 72 IN

## 2024-03-27 DIAGNOSIS — M54.42 CHRONIC MIDLINE LOW BACK PAIN WITH LEFT-SIDED SCIATICA: ICD-10-CM

## 2024-03-27 DIAGNOSIS — E66.9 OBESITY (BMI 30-39.9): ICD-10-CM

## 2024-03-27 DIAGNOSIS — G89.29 CHRONIC MIDLINE LOW BACK PAIN WITH LEFT-SIDED SCIATICA: ICD-10-CM

## 2024-03-27 DIAGNOSIS — M54.9 MID BACK PAIN: Primary | ICD-10-CM

## 2024-03-27 DIAGNOSIS — M47.816 LUMBAR FACET ARTHROPATHY: ICD-10-CM

## 2024-03-27 DIAGNOSIS — M47.895 OTHER SPONDYLOSIS, THORACOLUMBAR REGION: ICD-10-CM

## 2024-03-27 DIAGNOSIS — G47.33 OSA ON CPAP: Primary | ICD-10-CM

## 2024-03-27 DIAGNOSIS — M65.312 TRIGGER FINGER OF LEFT THUMB: ICD-10-CM

## 2024-03-27 PROCEDURE — 99214 OFFICE O/P EST MOD 30 MIN: CPT | Performed by: STUDENT IN AN ORGANIZED HEALTH CARE EDUCATION/TRAINING PROGRAM

## 2024-03-27 RX ORDER — TRIAMCINOLONE ACETONIDE 40 MG/ML
40 INJECTION, SUSPENSION INTRA-ARTICULAR; INTRAMUSCULAR ONCE
Status: COMPLETED | OUTPATIENT
Start: 2024-03-27 | End: 2024-03-27

## 2024-03-27 RX ORDER — BUPIVACAINE HYDROCHLORIDE 2.5 MG/ML
2 INJECTION, SOLUTION INFILTRATION; PERINEURAL ONCE
Status: COMPLETED | OUTPATIENT
Start: 2024-03-27 | End: 2024-03-27

## 2024-03-27 RX ORDER — METHYLPREDNISOLONE 4 MG/1
TABLET ORAL
Qty: 1 KIT | Refills: 0 | Status: SHIPPED | OUTPATIENT
Start: 2024-03-27

## 2024-03-27 RX ADMIN — TRIAMCINOLONE ACETONIDE 40 MG: 40 INJECTION, SUSPENSION INTRA-ARTICULAR; INTRAMUSCULAR at 14:41

## 2024-03-27 RX ADMIN — BUPIVACAINE HYDROCHLORIDE 5 MG: 2.5 INJECTION, SOLUTION INFILTRATION; PERINEURAL at 14:40

## 2024-03-27 SDOH — HEALTH STABILITY: PHYSICAL HEALTH: ON AVERAGE, HOW MANY MINUTES DO YOU ENGAGE IN EXERCISE AT THIS LEVEL?: 40 MIN

## 2024-03-27 SDOH — HEALTH STABILITY: PHYSICAL HEALTH: ON AVERAGE, HOW MANY DAYS PER WEEK DO YOU ENGAGE IN MODERATE TO STRENUOUS EXERCISE (LIKE A BRISK WALK)?: 4 DAYS

## 2024-03-27 ASSESSMENT — SLEEP AND FATIGUE QUESTIONNAIRES
HOW LIKELY ARE YOU TO NOD OFF OR FALL ASLEEP WHILE SITTING AND READING: MODERATE CHANCE OF DOZING
HOW LIKELY ARE YOU TO NOD OFF OR FALL ASLEEP WHEN YOU ARE A PASSENGER IN A CAR FOR AN HOUR WITHOUT A BREAK: SLIGHT CHANCE OF DOZING
HOW LIKELY ARE YOU TO NOD OFF OR FALL ASLEEP WHILE WATCHING TV: SLIGHT CHANCE OF DOZING
HOW LIKELY ARE YOU TO NOD OFF OR FALL ASLEEP WHILE WATCHING TV: SLIGHT CHANCE OF DOZING
HOW LIKELY ARE YOU TO NOD OFF OR FALL ASLEEP WHILE LYING DOWN TO REST IN THE AFTERNOON WHEN CIRCUMSTANCES PERMIT: SLIGHT CHANCE OF DOZING
HOW LIKELY ARE YOU TO NOD OFF OR FALL ASLEEP WHILE SITTING INACTIVE IN A PUBLIC PLACE: SLIGHT CHANCE OF DOZING
HOW LIKELY ARE YOU TO NOD OFF OR FALL ASLEEP WHILE SITTING QUIETLY AFTER LUNCH WITHOUT ALCOHOL: SLIGHT CHANCE OF DOZING
HOW LIKELY ARE YOU TO NOD OFF OR FALL ASLEEP IN A CAR, WHILE STOPPED FOR A FEW MINUTES IN TRAFFIC: WOULD NEVER DOZE
HOW LIKELY ARE YOU TO NOD OFF OR FALL ASLEEP WHILE SITTING INACTIVE IN A PUBLIC PLACE: SLIGHT CHANCE OF DOZING
HOW LIKELY ARE YOU TO NOD OFF OR FALL ASLEEP WHILE SITTING QUIETLY AFTER LUNCH WITHOUT ALCOHOL: SLIGHT CHANCE OF DOZING
HOW LIKELY ARE YOU TO NOD OFF OR FALL ASLEEP WHILE SITTING AND READING: MODERATE CHANCE OF DOZING
HOW LIKELY ARE YOU TO NOD OFF OR FALL ASLEEP WHILE SITTING AND TALKING TO SOMEONE: WOULD NEVER DOZE
HOW LIKELY ARE YOU TO NOD OFF OR FALL ASLEEP WHEN YOU ARE A PASSENGER IN A CAR FOR AN HOUR WITHOUT A BREAK: SLIGHT CHANCE OF DOZING
ESS TOTAL SCORE: 7
HOW LIKELY ARE YOU TO NOD OFF OR FALL ASLEEP WHILE SITTING AND TALKING TO SOMEONE: WOULD NEVER DOZE
HOW LIKELY ARE YOU TO NOD OFF OR FALL ASLEEP WHILE LYING DOWN TO REST IN THE AFTERNOON WHEN CIRCUMSTANCES PERMIT: SLIGHT CHANCE OF DOZING

## 2024-03-27 NOTE — PATIENT INSTRUCTIONS
Mask discussed during today's visit: Resmed Airfit F30i (with the hose attached to the top of the head/headgear)    YOUSIF education:    You have obstructive sleep apnea (YOUSIF):    1. Obstructive sleep apnea (YOUSIF) is a condition where the upper airway narrows or closes intermittently during sleep. This can lead to drops in your oxygen levels during sleep, arousals from sleep, and excessive daytime sleepiness.     2. Untreated YOUSIF is associated with increased risk of hypertension, cardiac disease, myocardial infarction, stroke, and poor blood sugar control. Treating your YOUSIF can decrease these risks.    3. Weight loss does improve sleep apnea. It is important to have a healthy diet and an exercise program.     4. Alcohol and sedating medicine can make YOUSIF worse and should be avoided in particular before sleep.    5. If you have surgery or are hospitalized, tell your doctor that you have YOUSIF and bring your CPAP to the hospital.    6. If you are drowsy or sleepy, you should not drive. If you are driving and become drowsy or sleepy, you should pull over to a safe place. Below are our drowsy driving tips.     PAP machine care:   You should clean your PAP regularly (see your PAP  site for more details)  Daily cleaning   1. Wipe mask with a damp towel with mild detergent, rinse with a damp towel and let air dry. You can also see CPAP cleaning wipes. Avoid harsh cleaning wipes or chemicals.    2. Humidifier- empty water daily. Fill with clean distilled water before use before sleep.    Weekly Cleaning   1. Clean mask, headgear, and tubing weekly  2. Fill your sink with warm water and a few drops of mild dish soap. Swirl equipment for at least 5 minutes. Rinse well and air dry. Hang hose over something so the water droplets drip out.   3. Clean filter- rinse with warm water, squeeze excess water and blot dry with towel. If there is a white filter (respironics machine)- do not wash that - it is disposable and should
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
## 2024-03-27 NOTE — PROGRESS NOTES
Diagnosis: [x] YOUSIF  (G47.33) [] CSA (G47.31) []  Other:__________________   Length of Need: [] 13 months [x]  99 Months                                          []  Other:__________________   Machine (GILMA): [] Respironics Auto (with modem for remote monitoring)       [] Other:____________________    []  ResMed Auto (with modem for remote monitoring)    []  CPAP () [] Bilevel ()   Mode: Mode:   [] Auto [] Fixed [] Auto [] Spontaneous   Pmin:_________cmH2O      Pmax:_________cmH2O   P:_________cmH2O    EPAPmin:__________cmH2O IPAP:__________cmH2O     IPAPmax:__________cmH2O EPAP:__________cmH2O     PSmin:_______  PSmax:_______       (ResMed) PS:_________     Flex/EPR - 3 full time                          Ramp time: 30 min Flex/EPR - 3 full time                 Ramp time: 30 min   Ramp Pressure:___________cmH2O Ramp Pressure:____________cmH2O         Humidifier: [] Heated ()                                               [] Passive     [] Water chamber replacement ()/ 1 per 6 months   Mask:   [] Nasal () /1 per 3 months [x] Full Face () /1 per 3 months   [] Patient choice -Size and fit mask [x] Patient Choice - Size and fit mask   [] Dispense:  [x] Dispense: AirFit F30i   [] Headgear () / 1 per 3 months [x] Headgear () / 1 per 3 months   [] Replacement Nasal Cushion ()/2 per month [x] Interface Replacement ()/1 per month   [] Replacement Nasal Pillows ()/2 per month       Tubing: [] Heated ()/1 per 3 months                           [] Standard ()/1 per 3 months  [] Other:____________________   Filters: [] Non-disposable ()/1 per 6 months                 [] Ultra-Fine, Disposable ()/2 per month     Miscellaneous: [] Chin Strap ()/ 1 per 6months      [] Other:__________________________________         Start Order Date: 03/27/24    MEDICAL JUSTIFICATION:  I, the undersigned, certify that the above prescribed supplies are medically necessary

## 2024-03-27 NOTE — PROGRESS NOTES
[facility]  Sleep Medicine  [address] [place] [address], [address]  [place] [address]    Chief Complaint   Patient presents with    Sleep Apnea         Vineet Garcia comes in today for sleep apnea follow-up . He was diagnosed with severe obstructive sleep apnea and is being treated with PAP therapy.   He has been on PAP therapy for about 1 years.  He states he wears the PAP device most nights.  At previous visit, pressure was increased, ramp was turned off, and humidity level was lowered per patient's request.  Today he reports he hasn't been using his machine as much as before. He reports that his current mask is uncomfortable. He is requesting for a switch to a different mask.     He falls asleep in variable durations of time (usually falls asleep watching TV then he goes to his bed) .  He awakens a couple of times per night (with the CPAP once) and takes no naps except once a week. The average total amount of sleep is about 6-7 hours per night. He does not use sleep aid/s. Symptoms of sleep apnea have improved since using PAP therapy.  He is not snoring with the machine on.  He does complain of mask fit / discomfort issues and high/frequent leaks.   DME: [facility]  Mask: Full facemask  Machine: ResMed Airsense 11 Autoset     Diagnostic Review  HST on 3/6/2023 showed respiratory event index of 51.3/h with oxygen desaturation down to a gladys of 76%.  He is currently on auto CPAP with pressure of 5-15 cmH2O.    [place]           3/27/2024     8:18 AM 4/19/2023     8:51 AM 2/22/2023     8:42 AM 8/11/2022    10:34 AM   Sleep Medicine   Sitting and reading 2 2 3    Watching TV 1 2 1    Sitting, inactive in a public place (e.g. a theatre or a meeting) 1 0 0    As a passenger in a car for an hour without a break 1 1 0    Lying down to rest in the afternoon when circumstances permit 1 3 2    Sitting and talking to someone 0 0 0    Sitting quietly after a lunch without alcohol 1 0 2    In a car, while

## 2024-04-02 SDOH — HEALTH STABILITY: PHYSICAL HEALTH: ON AVERAGE, HOW MANY MINUTES DO YOU ENGAGE IN EXERCISE AT THIS LEVEL?: 50 MIN

## 2024-04-02 SDOH — HEALTH STABILITY: PHYSICAL HEALTH: ON AVERAGE, HOW MANY DAYS PER WEEK DO YOU ENGAGE IN MODERATE TO STRENUOUS EXERCISE (LIKE A BRISK WALK)?: 4 DAYS

## 2024-04-03 ENCOUNTER — TELEPHONE (OUTPATIENT)
Dept: ORTHOPEDIC SURGERY | Age: 59
End: 2024-04-03

## 2024-04-03 ENCOUNTER — OFFICE VISIT (OUTPATIENT)
Dept: ORTHOPEDIC SURGERY | Age: 59
End: 2024-04-03
Payer: COMMERCIAL

## 2024-04-03 VITALS — BODY MASS INDEX: 34.13 KG/M2 | HEIGHT: 72 IN | WEIGHT: 251.99 LBS

## 2024-04-03 VITALS — RESPIRATION RATE: 16 BRPM | WEIGHT: 252 LBS | BODY MASS INDEX: 34.13 KG/M2 | HEIGHT: 72 IN

## 2024-04-03 DIAGNOSIS — M65.312 TRIGGER FINGER OF LEFT THUMB: Primary | ICD-10-CM

## 2024-04-03 DIAGNOSIS — R20.0 NUMBNESS: Primary | ICD-10-CM

## 2024-04-03 DIAGNOSIS — M79.672 LEFT FOOT PAIN: ICD-10-CM

## 2024-04-03 DIAGNOSIS — G57.52 TARSAL TUNNEL SYNDROME OF LEFT SIDE: ICD-10-CM

## 2024-04-03 PROCEDURE — 99214 OFFICE O/P EST MOD 30 MIN: CPT | Performed by: ORTHOPAEDIC SURGERY

## 2024-04-03 PROCEDURE — 99204 OFFICE O/P NEW MOD 45 MIN: CPT | Performed by: PHYSICIAN ASSISTANT

## 2024-04-03 NOTE — PROGRESS NOTES
strength, and no instability both upper and lower extremities.    X-rays 3 views of the left foot taken today in office were reviewed and showed no acute fracture.    IMPRESSION:  3 months out from left foot tarsal tunnel, status post posterior tibial nerve decompression    PLAN: He can go back to normal activity as tolerated.  I discussed with him that it can take up to 18 months for his nerve to fully recover and his numbness and tingling to resolve.  Since his pain is worsening with no improvement in his worsening pain in the lateral foot, recommend getting an MRI to further evaluate for stress fracture or other internal derangement.  Follow-up results.        Justyn Phoenix MD

## 2024-04-03 NOTE — PROGRESS NOTES
bilaterally.  Wrist range of motion is Full bilaterally.  Sensation is normal in the Whole Hand bilaterally  Vascular examination reveals normal, good capillary refill, and good color bilaterally  Swelling is mild in the symptomatic digit, absent elsewhere bilaterally  There is no evidence of gross joint instability bilaterally.  Muscular strength is clinically appropriate bilaterally.  Examination for Stenosing Tenosynovitis demonstrates mild tenderness, thickening & nodularity at the A-1 pulley(s) of the Left Thumb.  There is a palpable Nota's Node.  There is Inducible triggering on active flexion with pain.  No other digits demonstrate evidence of Stenosing Tenosynovitis.        Impression:  Mr. Vineet Garcia has developed Stenosing Tenosynovitis (Trigger Finger), which is currently exacerbated, and presents requesting further treatment.    Plan:  I have had a thorough discussion with Mr. Vineet Garcia regarding the treatment options available for his initially presenting Thumb stenosing tenosynovitis, which is causing him significant  limitations.  I have outlined for Mr. Vineet Garcia the benefits and consequences of the various treatment modalities, including the fact that surgical treatment is the only modality which is reasonably expected to provide long lasting or permanent resolution of his symptoms.  Based upon our current discussion and a reasonable understating of the options available to him, Mr. Vineet Garcia has selected to proceed with surgical Thumb Trigger Finger Release.  I have discussed the details of the surgical procedure, the pre, delmy and postoperative concerns and the appropriate expectations after surgery with Mr. Vineet Garcia today. He was given the opportunity to ask questions, voiced an understanding of the procedure, and he did wish to proceed with FUTURE  Left Index Finger Trigger Finger Release (A1 Pulley Release).       I had an extensive discussion with

## 2024-04-09 ENCOUNTER — TELEPHONE (OUTPATIENT)
Dept: ORTHOPEDIC SURGERY | Age: 59
End: 2024-04-09

## 2024-04-09 ENCOUNTER — PREP FOR PROCEDURE (OUTPATIENT)
Dept: ORTHOPEDIC SURGERY | Age: 59
End: 2024-04-09

## 2024-04-09 DIAGNOSIS — R20.0 NUMBNESS OF LEFT FOOT: ICD-10-CM

## 2024-04-09 DIAGNOSIS — G57.52 TARSAL TUNNEL SYNDROME OF LEFT SIDE: Primary | ICD-10-CM

## 2024-04-09 DIAGNOSIS — M79.672 LEFT FOOT PAIN: ICD-10-CM

## 2024-04-09 PROBLEM — M65.30 TRIGGER FINGER: Status: ACTIVE | Noted: 2024-04-09

## 2024-04-09 NOTE — TELEPHONE ENCOUNTER
SW regarding received denial of MRI LEFT FOOT. Per insurance determination, the patient's MRI is being denied due to lack of conservative tx, preferably physical therapy. Therefore, the provider would like to refer the patient for physical therapy. I explained that once this completed, the MRI can be resubmitted should symptoms not improve.     The patient was asked to follow up in 6 weeks for follow up evaluation.

## 2024-04-11 ENCOUNTER — TELEPHONE (OUTPATIENT)
Dept: ORTHOPEDIC SURGERY | Age: 59
End: 2024-04-11

## 2024-04-11 RX ORDER — AMOXICILLIN 500 MG/1
CAPSULE ORAL
Qty: 4 CAPSULE | Refills: 1 | Status: SHIPPED | OUTPATIENT
Start: 2024-04-11

## 2024-04-11 NOTE — TELEPHONE ENCOUNTER
----- Message from Vineet Garcia sent at 4/11/2024  9:23 AM EDT -----  Regarding: dental appointment  Contact: [phone]  yes I do need a medicine (amoxocilian? ) every time I get my teeth cleaned or any dental procedure at all from my knee & hip replacement.  I will ask Dr Harris if Dr Phoenix won't do it?

## 2024-04-24 ENCOUNTER — HOSPITAL ENCOUNTER (OUTPATIENT)
Dept: PHYSICAL THERAPY | Age: 59
Setting detail: THERAPIES SERIES
Discharge: HOME | End: 2024-04-24
Attending: ORTHOPAEDIC SURGERY
Payer: COMMERCIAL

## 2024-04-24 DIAGNOSIS — M79.605 PAIN OF LEFT LOWER EXTREMITY: Primary | ICD-10-CM

## 2024-04-24 PROCEDURE — 97110 THERAPEUTIC EXERCISES: CPT

## 2024-04-24 PROCEDURE — 97530 THERAPEUTIC ACTIVITIES: CPT

## 2024-04-24 PROCEDURE — 97161 PT EVAL LOW COMPLEX 20 MIN: CPT

## 2024-04-24 NOTE — PLAN OF CARE
corresponding ICD-10 code that is of complexity and severity that require skilled therapeutic intervention. This has a direct and significant impact on the need for therapy and significantly impacts the rate of recovery.   The patient has a complexity identified by an ICD-10 code that has a direct and significant impact on the need for therapy.  (Significantly impacts the rate of recovery and is associated with a primary condition.)     Return to Play: NA    Prognosis for POC: [x] Good [] Fair  [] Poor    Patient requires continued skilled intervention: [x] Yes  [] No      CHARGE CAPTURE     PT CHARGE GRID   CPT Code (TIMED) minutes # CPT Code (UNTIMED) #     Therex (75887)  10 1  EVAL:LOW (83312 - Typically 20 minutes face-to-face) 1    Neuromusc. Re-ed (86995)    Re-Eval (49609)     Manual (66384)    Estim Unattended (52773)     Ther. Act (95460) 15 1  [facility] (38282)     Gait (74646)    Dry Needle 1-2 muscle (20560)     Aquatic Therex (00922)    Dry Needle 3+ muscle (20561)     Iontophoresis (11500)    VASO (45345)     Ultrasound (60698)    Group Therapy (07644)     Estim Attended (33940)    Canalith Repositioning (81345)     Other:    Other:    Total Timed Code Tx Minutes 25 2  1     Total Treatment Minutes 39        Charge Justification:  (93367) THERAPEUTIC EXERCISE - Provided verbal/tactile cueing for activities related to strengthening, flexibility, endurance, ROM performed to prevent loss of range of motion, maintain or improve muscular strength or increase flexibility, following either an injury or surgery.   (42580) HOME EXERCISE PROGRAM - Reviewed/Progressed HEP activities related to strengthening, flexibility, endurance, ROM performed to prevent loss of range of motion, maintain or improve muscular strength or increase flexibility, following either an injury or surgery.  (54394) THERAPEUTIC ACTIVITY - use of dynamic activities to improve functional performance. (Ex include squatting,

## 2024-05-08 ENCOUNTER — APPOINTMENT (OUTPATIENT)
Dept: PHYSICAL THERAPY | Age: 59
End: 2024-05-08
Attending: ORTHOPAEDIC SURGERY
Payer: COMMERCIAL

## 2024-05-11 ENCOUNTER — HOSPITAL ENCOUNTER (OUTPATIENT)
Dept: PHYSICAL THERAPY | Age: 59
Setting detail: THERAPIES SERIES
Discharge: HOME | End: 2024-05-11
Attending: ORTHOPAEDIC SURGERY
Payer: COMMERCIAL

## 2024-05-11 NOTE — FLOWSHEET NOTE
complexities/Impairments listed.  [] Progression has been slowed due to co-morbidities.  [x] Plan just implemented, too soon (<30days) to assess goals progression   [] Goals require adjustment due to lack of progress  [] Patient is not progressing as expected and requires additional follow up with physician  [] Other:     TREATMENT PLAN     Frequency/Duration: 1-2x/week for 8 weeks for the following treatment interventions:    Interventions:  Therapeutic Exercise (31977) including: strength training, ROM, and functional mobility  Therapeutic Activities (72351) including: functional mobility training and education.  Neuromuscular Re-education (28334) activation and proprioception, including postural re-education.    Gait Training (58498) for normalization of ambulation patterns and AD training.   Manual Therapy (29808) as indicated to include: Gr I-IV mobilizations, Grade V Manipulation, Soft Tissue Mobilization, and Dry Needling/IASTM  Modalities as needed that may include: Cryotherapy, Electrical Stimulation, and Vasoneumatic Compression  Patient education on joint protection, postural re-education, activity modification, and progression of HEP    Plan: POC initiated as per evaluation    Electronically Signed by Patricia Rosado, PT, DPT, OMT-C Date: 05/11/2024     Note: Portions of this note have been templated and/or copied from initial evaluation, reassessments and prior notes for documentation efficiency.    Note: If patient does not return for scheduled/recommended follow up visits, this note will serve as a discharge from care along with the most recent update on progress.

## 2024-05-14 ENCOUNTER — HOSPITAL ENCOUNTER (OUTPATIENT)
Dept: PHYSICAL THERAPY | Age: 59
Setting detail: THERAPIES SERIES
Discharge: HOME | End: 2024-05-14
Attending: ORTHOPAEDIC SURGERY
Payer: COMMERCIAL

## 2024-05-14 PROCEDURE — 97530 THERAPEUTIC ACTIVITIES: CPT

## 2024-05-14 PROCEDURE — 97110 THERAPEUTIC EXERCISES: CPT

## 2024-05-14 NOTE — FLOWSHEET NOTE
[facility] - Outpatient Rehabilitation and Therapy [address], [place] [address] office: [phone] fax: [phone]       Physical Therapy: TREATMENT/PROGRESS NOTE   Patient: Vineet Garcia (58 y.o. male)   Examination Date: 2024   :  1965 MRN: [id number]   Visit #: 2   Insurance Allowable Auth Needed   BMN  []Yes    [x]No    Insurance: Payor: BCBS / Plan: BCBS - [place] PPO / Product Type: *No Product type* /  ( policy)  Insurance ID: [id number] - ([facility])  Secondary Insurance (if applicable):    Treatment Diagnosis: -    ICD-10-CM    1. Pain of left lower extremity  M79.605          Medical Diagnosis:  Tarsal tunnel syndrome of left side [G57.52]  Left foot pain [M79.672]  Numbness of left foot [R20.0]   Referring Physician: Justyn Phoenix MD  PCP: Alannah Flores APRN - NP     Plan of care signed (Y/N):     Date of Patient follow up with Physician:      Progress Report/POC: NO  POC update due: (10 visits /OR AUTH LIMITS, whichever is less) - 2024                                             Precautions/ Contra-indications:           Latex allergy:  NO  Pacemaker:    NO  Contraindications for Manipulation: None  Date of Surgery: 23 - Post Tibial nerve decompression  Other:    Red Flags:  None    C-SSRS Triggered by Intake questionnaire:   [x] No, Questionnaire did not trigger screening.   [] Yes, Patient intake triggered further evaluation      [] C-SSRS Screening completed  [] PCP notified via Plan of Care  [] Emergency services notified     Preferred Language for Healthcare:   [x] English       [] other:    SUBJECTIVE EXAMINATION     Patient stated complaint: Pt reports he has been doing OK, overall symptoms are about the same. Pain is intermittent with standing and walking but minimal to none while sitting. Due to financial reasons, he wants to day to be his last day.        Test used Initial score  2024   Pain

## 2024-05-15 ENCOUNTER — TELEPHONE (OUTPATIENT)
Dept: CARDIOLOGY CLINIC | Age: 59
End: 2024-05-15

## 2024-05-16 ENCOUNTER — APPOINTMENT (OUTPATIENT)
Dept: PHYSICAL THERAPY | Age: 59
End: 2024-05-16
Attending: ORTHOPAEDIC SURGERY
Payer: COMMERCIAL

## 2024-05-21 ENCOUNTER — APPOINTMENT (OUTPATIENT)
Dept: PHYSICAL THERAPY | Age: 59
End: 2024-05-21
Attending: ORTHOPAEDIC SURGERY
Payer: COMMERCIAL

## 2024-05-22 NOTE — PROGRESS NOTES
scribed in the presence of Dr Loving by Leeroy Bonilla RN.      Physician Attestation  The scribe for and in the presence of me (Mario Loving DO).  The scribe Leeroy Bonilla RN  may have prepopulated components of this note with my historical  intellectual property under my direct supervision.  Any additions to this intellectual property were performed in my presence and at my direction.  Furthermore, the content and accuracy of this note have been reviewed by me (Mario Loving DO).  6/4/2024 8:16 AM

## 2024-05-23 ENCOUNTER — APPOINTMENT (OUTPATIENT)
Dept: PHYSICAL THERAPY | Age: 59
End: 2024-05-23
Attending: ORTHOPAEDIC SURGERY
Payer: COMMERCIAL

## 2024-05-28 ASSESSMENT — PATIENT HEALTH QUESTIONNAIRE - PHQ9
SUM OF ALL RESPONSES TO PHQ QUESTIONS 1-9: 0
SUM OF ALL RESPONSES TO PHQ9 QUESTIONS 1 & 2: 0
SUM OF ALL RESPONSES TO PHQ QUESTIONS 1-9: 0
1. LITTLE INTEREST OR PLEASURE IN DOING THINGS: NOT AT ALL
2. FEELING DOWN, DEPRESSED OR HOPELESS: NOT AT ALL

## 2024-05-29 ENCOUNTER — PATIENT MESSAGE (OUTPATIENT)
Dept: ORTHOPEDIC SURGERY | Age: 59
End: 2024-05-29

## 2024-05-29 DIAGNOSIS — M17.0 PRIMARY OSTEOARTHRITIS OF BOTH KNEES: ICD-10-CM

## 2024-05-29 RX ORDER — PANTOPRAZOLE SODIUM 40 MG/1
40 TABLET, DELAYED RELEASE ORAL DAILY
Qty: 7 TABLET | Refills: 0 | OUTPATIENT
Start: 2024-05-29

## 2024-05-29 RX ORDER — ATORVASTATIN CALCIUM 40 MG/1
40 TABLET, FILM COATED ORAL DAILY
Qty: 90 TABLET | Refills: 0 | OUTPATIENT
Start: 2024-05-29

## 2024-05-29 RX ORDER — CARVEDILOL 3.12 MG/1
3.12 TABLET ORAL 2 TIMES DAILY
Qty: 180 TABLET | Refills: 3 | OUTPATIENT
Start: 2024-05-29

## 2024-05-29 RX ORDER — GABAPENTIN 600 MG/1
600 TABLET ORAL 3 TIMES DAILY
Qty: 90 TABLET | Refills: 2 | Status: SHIPPED | OUTPATIENT
Start: 2024-05-29 | End: 2024-08-27

## 2024-05-29 ASSESSMENT — PATIENT HEALTH QUESTIONNAIRE - PHQ9
SUM OF ALL RESPONSES TO PHQ9 QUESTIONS 1 & 2: 0
1. LITTLE INTEREST OR PLEASURE IN DOING THINGS: NOT AT ALL
2. FEELING DOWN, DEPRESSED OR HOPELESS: NOT AT ALL

## 2024-05-29 NOTE — TELEPHONE ENCOUNTER
From: Vineet Garcia  To: Too Lynn  Sent: 5/29/2024 10:47 AM EDT  Subject: gabapentin refill    Anel Gage, last time I was in your office you said you could refill my gabapentin instead of me going to the Pain Management Dr.  I am now due for a refill and need to ask if you can please order me a 3 month prescription at [facility]:  [address] [address] = [phone]  600 mg tablet 3 times per day  Thank you very much for your help  Vineet Garcia

## 2024-05-29 NOTE — TELEPHONE ENCOUNTER
Called Pt, no answer, LVM. Sent scheduling MyChart ticket. Pt has not been seen in a year. Unable to fill any medications until appt is made.

## 2024-05-29 NOTE — TELEPHONE ENCOUNTER
Notify pt':  INR is at goal.  Continue same med dose. Next INR in 4weeks. Rx request has been e-prescribed.  OK to have patient  Rx at Pharmacy    The current treatment regimen is needed to decrease the patient's pain symptoms, improve the quality of life and ability to function and improve sleep and mood symptoms.  Controlled substances monitoring:  I have discussed risk and benefits of the medication including potential for addiction and risk of overdose and responsibility to safely store and appropriately dispose of medications. he states the medications and treatment have helped improve the quality of life and helped in the psycho-social functioning.  There are no indicators for possible drug abuse, addiction or diversion problems. No signs of potential drug abuse or diversion identified, and OARRS report reviewed today- activity consistent with treatment plan. Risks and benefits of the medications and other alternative treatments were discussed with the patient. Opioid pain medication use presents serious risks, including but not limited to development of opioid use disorder,  addiction, abuse, misuse, overdose, respiratory depression and even death. The common side effects of these medications were also explained to the patient. Informed verbal consent was obtained.    Education provided on taking opioids with other sedative/hypnotics such as Benzodiazepines, the side effects can be dangerous as hypnotics/sedatives intensify the effects of opioids, which can lead to respiratory depression, this in turn increases over dose fatality in addition to impairing cognitive functioning. This is a concern especially with pre-existing condition such as emphysema. he verbalized understanding.     Education provided on consuming alcohol while on narcotics as the side effects can be dangerous as opiates intensify the effects of alcohol, which can lead to central nervous depression, confusion or delirium. he verbalized understanding.

## 2024-05-31 ENCOUNTER — OFFICE VISIT (OUTPATIENT)
Dept: VASCULAR SURGERY | Age: 59
End: 2024-05-31
Payer: COMMERCIAL

## 2024-05-31 VITALS — HEART RATE: 77 BPM | BODY MASS INDEX: 34 KG/M2 | WEIGHT: 251 LBS | HEIGHT: 72 IN

## 2024-05-31 DIAGNOSIS — R60.0 BILATERAL LOWER EXTREMITY EDEMA: ICD-10-CM

## 2024-05-31 DIAGNOSIS — I87.2 VENOUS INSUFFICIENCY: ICD-10-CM

## 2024-05-31 DIAGNOSIS — G57.52 TARSAL TUNNEL SYNDROME OF LEFT SIDE: Primary | ICD-10-CM

## 2024-05-31 PROCEDURE — 99213 OFFICE O/P EST LOW 20 MIN: CPT | Performed by: SURGERY

## 2024-05-31 RX ORDER — TRAMADOL HYDROCHLORIDE 50 MG/1
50 TABLET ORAL EVERY 6 HOURS PRN
Qty: 12 TABLET | Refills: 0 | Status: SHIPPED | OUTPATIENT
Start: 2024-05-31 | End: 2024-06-03

## 2024-05-31 NOTE — PROGRESS NOTES
at [facility] OR    TOTAL HIP ARTHROPLASTY Left 07/2022    TOTAL KNEE ARTHROPLASTY Left 12/17/2019    LEFT TOTAL KNEE REPLACEMENT ROBOTIC ASSISTED performed by Eh Sol MD at [facility] OR    ULNAR TUNNEL RELEASE Left 10/07/2014    ulnar nerve decompression at elbow    ULNAR TUNNEL RELEASE Right 11/25/2014    UMBILICAL HERNIA REPAIR  06/25/2015    UPPER GASTROINTESTINAL ENDOSCOPY      UPPER GASTROINTESTINAL ENDOSCOPY  11/16/2017    Dr Federica Jean  Current Outpatient Medications   Medication Sig Dispense Refill    traMADol (ULTRAM) 50 MG tablet Take 1 tablet by mouth every 6 hours as needed for Pain for up to 3 days. Intended supply: 3 days. Take lowest dose possible to manage pain Max Daily Amount: 200 mg 12 tablet 0    Compression Stockings MISC by Does not apply route 2 each 2    gabapentin (NEURONTIN) 600 MG tablet Take 1 tablet by mouth 3 times daily for 90 days. 90 tablet 2    amoxicillin (AMOXIL) 500 MG capsule 4 tablets by mouth 1 hour before dental appointment 4 capsule 1    pantoprazole (PROTONIX) 40 MG tablet Take 1 tablet by mouth daily 7 tablet 0    carvedilol (COREG) 3.125 MG tablet Take 1 tablet by mouth 2 times daily 180 tablet 3    diclofenac sodium (VOLTAREN) 1 % GEL Apply up to 4 times daily as needed for pain, #4 tubes, 5 RF 64 g 5    atorvastatin (LIPITOR) 40 MG tablet Take 1 tablet by mouth daily 90 tablet 0    CPAP Machine MISC by Does not apply route      Menthol, Topical Analgesic, (BIOFREEZE) 4 % GEL Apply topically       No current facility-administered medications for this visit.       Allergies  Allergies   Allergen Reactions    Excedrin Extra Strength [Aspirin-Acetaminophen-Caffeine] Rash    Red Dye Rash       Social History  Social History     Socioeconomic History    Marital status:      Spouse name: None    Number of children: None    Years of education: None    Highest education level: None   Tobacco Use    Smoking status: Never    Smokeless tobacco: Never   Vaping Use

## 2024-06-04 ENCOUNTER — OFFICE VISIT (OUTPATIENT)
Dept: FAMILY MEDICINE CLINIC | Age: 59
End: 2024-06-04
Payer: COMMERCIAL

## 2024-06-04 ENCOUNTER — OFFICE VISIT (OUTPATIENT)
Dept: CARDIOLOGY CLINIC | Age: 59
End: 2024-06-04
Payer: COMMERCIAL

## 2024-06-04 VITALS
BODY MASS INDEX: 35.56 KG/M2 | HEART RATE: 67 BPM | SYSTOLIC BLOOD PRESSURE: 126 MMHG | OXYGEN SATURATION: 97 % | WEIGHT: 262.2 LBS | DIASTOLIC BLOOD PRESSURE: 72 MMHG | TEMPERATURE: 99.1 F

## 2024-06-04 VITALS
OXYGEN SATURATION: 98 % | WEIGHT: 260 LBS | DIASTOLIC BLOOD PRESSURE: 80 MMHG | BODY MASS INDEX: 35.26 KG/M2 | HEART RATE: 73 BPM | SYSTOLIC BLOOD PRESSURE: 128 MMHG

## 2024-06-04 DIAGNOSIS — R60.0 BILATERAL LEG EDEMA: ICD-10-CM

## 2024-06-04 DIAGNOSIS — I35.1 AORTIC VALVE INSUFFICIENCY, ETIOLOGY OF CARDIAC VALVE DISEASE UNSPECIFIED: Primary | ICD-10-CM

## 2024-06-04 DIAGNOSIS — E78.00 PURE HYPERCHOLESTEROLEMIA: ICD-10-CM

## 2024-06-04 DIAGNOSIS — K21.9 GASTROESOPHAGEAL REFLUX DISEASE WITHOUT ESOPHAGITIS: Primary | ICD-10-CM

## 2024-06-04 DIAGNOSIS — G47.33 OSA (OBSTRUCTIVE SLEEP APNEA): ICD-10-CM

## 2024-06-04 DIAGNOSIS — I25.83 CORONARY ARTERY DISEASE DUE TO LIPID RICH PLAQUE: ICD-10-CM

## 2024-06-04 DIAGNOSIS — Z12.5 SCREENING FOR MALIGNANT NEOPLASM OF PROSTATE: ICD-10-CM

## 2024-06-04 DIAGNOSIS — I10 ESSENTIAL HYPERTENSION: ICD-10-CM

## 2024-06-04 DIAGNOSIS — E78.2 MIXED HYPERLIPIDEMIA: ICD-10-CM

## 2024-06-04 DIAGNOSIS — R73.9 HYPERGLYCEMIA: ICD-10-CM

## 2024-06-04 DIAGNOSIS — I25.10 CORONARY ARTERY DISEASE DUE TO LIPID RICH PLAQUE: ICD-10-CM

## 2024-06-04 DIAGNOSIS — M65.312 TRIGGER FINGER OF LEFT THUMB: Primary | ICD-10-CM

## 2024-06-04 DIAGNOSIS — Z01.818 PREOP EXAMINATION: ICD-10-CM

## 2024-06-04 PROCEDURE — 3079F DIAST BP 80-89 MM HG: CPT | Performed by: INTERNAL MEDICINE

## 2024-06-04 PROCEDURE — 99213 OFFICE O/P EST LOW 20 MIN: CPT | Performed by: NURSE PRACTITIONER

## 2024-06-04 PROCEDURE — 99214 OFFICE O/P EST MOD 30 MIN: CPT | Performed by: INTERNAL MEDICINE

## 2024-06-04 PROCEDURE — 3074F SYST BP LT 130 MM HG: CPT | Performed by: INTERNAL MEDICINE

## 2024-06-04 SDOH — ECONOMIC STABILITY: FOOD INSECURITY: WITHIN THE PAST 12 MONTHS, YOU WORRIED THAT YOUR FOOD WOULD RUN OUT BEFORE YOU GOT MONEY TO BUY MORE.: SOMETIMES TRUE

## 2024-06-04 SDOH — ECONOMIC STABILITY: INCOME INSECURITY: HOW HARD IS IT FOR YOU TO PAY FOR THE VERY BASICS LIKE FOOD, HOUSING, MEDICAL CARE, AND HEATING?: SOMEWHAT HARD

## 2024-06-04 SDOH — ECONOMIC STABILITY: FOOD INSECURITY: WITHIN THE PAST 12 MONTHS, THE FOOD YOU BOUGHT JUST DIDN'T LAST AND YOU DIDN'T HAVE MONEY TO GET MORE.: SOMETIMES TRUE

## 2024-06-04 NOTE — PROGRESS NOTES
Preoperative Consultation    Vineet Garcia  YOB: 1965    This patient presents to the office today for a preoperative consultation at the request of surgeon, Dr. Brett Cohen, who plans on performing left trigger thumb release on June 25 at [facility].      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    Arthritis of left knee    History of total knee replacement, left 12/17/2019    Gastroesophageal reflux disease without esophagitis    Left foot pain    Bilateral leg edema    Obesity    Elevated BP without diagnosis of hypertension    Shortness of breath [R06.02 (ICD-10-CM)]    Tarsal tunnel syndrome of left side    Trigger finger of left thumb    Chronic midline low back pain with left-sided sciatica    Mid back pain    Lumbar facet arthropathy    Other spondylosis, thoracolumbar region    Numbness    Trigger finger     Past Surgical History:   Procedure Laterality Date    ANTERIOR CRUCIATE LIGAMENT REPAIR  1991    CARPAL TUNNEL RELEASE Left 10/07/2014    CARPAL TUNNEL RELEASE Right 11/25/2014    COLONOSCOPY      age 38    COLONOSCOPY N/A 03/29/2022    COLONOSCOPY DIAGNOSTIC performed by Jeff Stallworth MD at [facility] ENDOSCOPY    ELBOW SURGERY      FOOT SURGERY Left     WANG'S NEUROMA    JOINT REPLACEMENT Bilateral     right, 3/18    KNEE ARTHROSCOPY Bilateral     SYNOVECTOMY, AND CHONDROPLASTY LEFT KNEE    NERVE SURGERY Left

## 2024-06-04 NOTE — PATIENT INSTRUCTIONS
Activity encouraged, could consider Silver Sneakers    Call for any change in symptoms, call to report any changes in shortness of breath or development of chest pain with activity.    Follow up in 12 mos with same day echocardiogram

## 2024-06-05 ENCOUNTER — TELEPHONE (OUTPATIENT)
Dept: PULMONOLOGY | Age: 59
End: 2024-06-05

## 2024-06-05 RX ORDER — PANTOPRAZOLE SODIUM 40 MG/1
40 TABLET, DELAYED RELEASE ORAL DAILY
Qty: 7 TABLET | Refills: 0 | Status: SHIPPED | OUTPATIENT
Start: 2024-06-05

## 2024-06-05 NOTE — TELEPHONE ENCOUNTER
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  Patient given report and it was uploaded in [employer].    Patient's AHI is at 9.9.  He was wondering if pressures could be changed.  Patient was encouraged to use machine as much as possible as he is applying for a new job and will meet with them in two weeks.      Patient instructed to check back in a week after pressure changes are made.  Please call patient when pressure changes are made.   Medical Necessity Statement: Based on my medical judgement, Mohs surgery is the most appropriate treatment for this cancer compared to other treatments.

## 2024-06-06 NOTE — TELEPHONE ENCOUNTER
Patient was notified of the pressure change this morning. He is asking for a compliance paper from back a few months ago when he was compliant, as well as the current one that shows he is non compliant

## 2024-06-11 DIAGNOSIS — M17.0 PRIMARY OSTEOARTHRITIS OF BOTH KNEES: ICD-10-CM

## 2024-06-17 ENCOUNTER — TELEPHONE (OUTPATIENT)
Dept: FAMILY MEDICINE CLINIC | Age: 59
End: 2024-06-17

## 2024-06-17 RX ORDER — PANTOPRAZOLE SODIUM 40 MG/1
40 TABLET, DELAYED RELEASE ORAL DAILY
Qty: 90 TABLET | Refills: 0 | Status: SHIPPED | OUTPATIENT
Start: 2024-06-17

## 2024-06-17 NOTE — TELEPHONE ENCOUNTER
pantoprazole (PROTONIX) 40 MG tablet     [facility]/pharmacy #[id number] - [place], [place] - [address] [address] -  [phone] - F [phone] [address] [address] [facility] [address]  Phone: [phone]  Fax: [phone]     Pt called requesting a 30 day refill on rx however; the pt is concerns due to the dispense only being 7 tablets.     Please advise...

## 2024-06-19 ENCOUNTER — TELEPHONE (OUTPATIENT)
Dept: ORTHOPEDIC SURGERY | Age: 59
End: 2024-06-19

## 2024-06-19 ENCOUNTER — PREP FOR PROCEDURE (OUTPATIENT)
Dept: ORTHOPEDIC SURGERY | Age: 59
End: 2024-06-19

## 2024-06-19 NOTE — TELEPHONE ENCOUNTER
General Question     Subject: CAN SX  Patient and /or Facility Request: Vineet Garcia   Contact Number: [phone]     PT CLLED TO [facility] SX

## 2024-06-20 RX ORDER — CARVEDILOL 3.12 MG/1
3.12 TABLET ORAL 2 TIMES DAILY
Qty: 180 TABLET | Refills: 3 | OUTPATIENT
Start: 2024-06-20

## 2024-07-18 ENCOUNTER — PATIENT MESSAGE (OUTPATIENT)
Dept: CARDIOLOGY CLINIC | Age: 59
End: 2024-07-18

## 2024-07-23 RX ORDER — ATORVASTATIN CALCIUM 40 MG/1
40 TABLET, FILM COATED ORAL DAILY
Qty: 90 TABLET | Refills: 3 | Status: SHIPPED | OUTPATIENT
Start: 2024-07-23

## 2024-07-29 ENCOUNTER — PATIENT MESSAGE (OUTPATIENT)
Dept: FAMILY MEDICINE CLINIC | Age: 59
End: 2024-07-29

## 2024-07-30 NOTE — TELEPHONE ENCOUNTER
From: Vineet Garcia  To: Alannah Gormanjciechowski  Sent: 2024 6:24 PM EDT  Subject: lab request    Hello, last time I came to the office on  was for a pre-surgery physical.  I just scheduled my yearly physical for .  When I was there  for the pre-surgery physical Mrs. Flores said she would put in a fasting blood draw for my yearly physical.  I think [facility] is the cheapest option to get my blood drawn...is the [facility] order still valid to get it drawn there or has it ?  Is / was the doctors order sent to [facility] or was it sent to [facility] or does it matter where it's sent?  I want to schedule the appointment for this week if possible.  Thank you for your help  Vineet Garcia

## 2024-08-07 ENCOUNTER — TELEPHONE (OUTPATIENT)
Dept: FAMILY MEDICINE CLINIC | Age: 59
End: 2024-08-07

## 2024-08-07 NOTE — TELEPHONE ENCOUNTER
Upcoming appt 8/13, labs show slight elevation 1 liver enzyme, prediabetic w/ A1c- 6.0, will discuss at appt.

## 2024-08-19 ENCOUNTER — OFFICE VISIT (OUTPATIENT)
Dept: ENT CLINIC | Age: 59
End: 2024-08-19
Payer: COMMERCIAL

## 2024-08-19 ENCOUNTER — PROCEDURE VISIT (OUTPATIENT)
Dept: AUDIOLOGY | Age: 59
End: 2024-08-19
Payer: COMMERCIAL

## 2024-08-19 VITALS
SYSTOLIC BLOOD PRESSURE: 134 MMHG | DIASTOLIC BLOOD PRESSURE: 85 MMHG | WEIGHT: 258 LBS | HEIGHT: 72 IN | OXYGEN SATURATION: 97 % | HEART RATE: 76 BPM | TEMPERATURE: 97.5 F | BODY MASS INDEX: 34.95 KG/M2

## 2024-08-19 DIAGNOSIS — G51.0 FACIAL NERVE PARESIS: ICD-10-CM

## 2024-08-19 DIAGNOSIS — H91.8X3 ASYMMETRICAL HEARING LOSS: Primary | ICD-10-CM

## 2024-08-19 DIAGNOSIS — H90.3 SENSORINEURAL HEARING LOSS (SNHL) OF BOTH EARS: Primary | ICD-10-CM

## 2024-08-19 PROCEDURE — 99213 OFFICE O/P EST LOW 20 MIN: CPT | Performed by: STUDENT IN AN ORGANIZED HEALTH CARE EDUCATION/TRAINING PROGRAM

## 2024-08-19 PROCEDURE — 92567 TYMPANOMETRY: CPT | Performed by: AUDIOLOGIST

## 2024-08-19 PROCEDURE — 92557 COMPREHENSIVE HEARING TEST: CPT | Performed by: AUDIOLOGIST

## 2024-08-19 NOTE — PROGRESS NOTES
Vineet Garcia   1965, 59 y.o. male   [id number]       Referring Provider: Lee England DO  Referral Type: Referring provider encounter note    Reason for Visit: Evaluation of suspected change in hearing, tinnitus, or balance.      ADULT AUDIOLOGIC EVALUATION      Vineet Garcia is a 59 y.o. male seen today, 8/19/2024, for a recheck audiologic evaluation.      AUDIOLOGIC AND OTHER PERTINENT MEDICAL HISTORY:        Vineet Garcia noted known RE>LE sensorineural hearing loss; concern for decreased hearing bilaterally, feels he is missing out on more conversation and mishearing words.      Vineet Garcia denied otalgia, aural fullness, otorrhea, tinnitus, dizziness, and imbalance.    IMPRESSIONS:       Today's results are consistent with bilateral sensorineural hearing loss, slight decrease in high frequencies compared to 2023, with excellent word recognition for both ears; right er with positive pressure and left ear with normal middle ear function.  Hearing loss is significant enough to result in difficulty understanding speech in at least some listening environments.  Discussed realistic expectations with hearing aids given severity of hearing loss; he may consider if desired.  Follow medical recommendations from Dr. England.    ASSESSMENT AND FINDINGS:       Otoscopy revealed: Clear ear canals bilaterally      RIGHT EAR:  Hearing Sensitivity: Within normal limits through 2000 Hz steeply sloping to moderately-severe sensorineural hearing loss.  Speech Recognition Threshold: 10 dBHL  Word Recognition: Excellent (100%), based on NU-6 Ordered-by-Difficulty 10-word list at 50 dBHL using recorded speech stimuli.    Tympanometry: Positive peak pressure and compliance, Type A tympanogram, consistent with possible eustachian tube dysfunction.    LEFT EAR:  Hearing Sensitivity: Within normal limits through 2000 Hz steeply loping to mild to moderate sensorineural hearing loss.  Speech Recognition
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
## 2024-08-19 NOTE — PATIENT INSTRUCTIONS
back and forth on the telephone instead of talking or listening. These devices are also called TDD. When messages are typed on the keyboard, they are sent over the phone line to a receiving TTY. The message is shown on a monitor.  Use pagers, fax machines, text, and email if it is hard for you to communicate by telephone.  Try to learn a listening technique called speech-reading. It is not lip-reading. You pay attention to people's gestures, expressions, posture, and tone of voice. These clues can help you understand what a person is saying. Face the person you are talking to, and have him or her face you. Make sure the lighting is good. You need to see the other person's face clearly.  Think about counseling if you need help to adjust to your hearing loss.    When should you call for help?  Watch closely for changes in your health, and be sure to contact your doctor if:    You think your hearing is getting worse.     You have new symptoms, such as dizziness or nausea.     Noise-Induced Hearing Loss  What it is, and what you can do to prevent it    Exposure to loud sounds, in an occupational setting or recreational, can cause permanent hearing loss.  Sound is measured in decibels (dB).  Noise-induced hearing loss is the ONLY type of preventable hearing loss.  Hearing loss related to noise exposure can occur at any age.      There are small sensory cells, called inner and outer hair cells, within the inner ear (cochlea).  These cells process the loudness (intensity) and pitch (frequency) of sound and send the signal to the brain via our auditory nerve (vestibulocochlear nerve, cranial nerve VIII).  When these cells are damaged, they can result in permanent hearing loss and/or tinnitus.  The hair cells responsible for high frequency sounds, like birds chirping, are most likely to be damaged due to loud sounds.  The high frequency sounds are also very important for our clarity and understanding of

## 2024-08-27 ENCOUNTER — OFFICE VISIT (OUTPATIENT)
Dept: FAMILY MEDICINE CLINIC | Age: 59
End: 2024-08-27
Payer: COMMERCIAL

## 2024-08-27 VITALS
SYSTOLIC BLOOD PRESSURE: 136 MMHG | DIASTOLIC BLOOD PRESSURE: 86 MMHG | WEIGHT: 261.2 LBS | OXYGEN SATURATION: 97 % | HEART RATE: 69 BPM | BODY MASS INDEX: 35.43 KG/M2 | TEMPERATURE: 98.1 F

## 2024-08-27 DIAGNOSIS — N52.9 ERECTILE DYSFUNCTION, UNSPECIFIED ERECTILE DYSFUNCTION TYPE: ICD-10-CM

## 2024-08-27 DIAGNOSIS — I20.89 OTHER FORMS OF ANGINA PECTORIS (HCC): ICD-10-CM

## 2024-08-27 DIAGNOSIS — E66.01 SEVERE OBESITY (BMI 35.0-39.9) WITH COMORBIDITY (HCC): ICD-10-CM

## 2024-08-27 DIAGNOSIS — G62.9 NEUROPATHY: ICD-10-CM

## 2024-08-27 DIAGNOSIS — Z00.00 PREVENTATIVE HEALTH CARE: Primary | ICD-10-CM

## 2024-08-27 PROCEDURE — 99396 PREV VISIT EST AGE 40-64: CPT | Performed by: NURSE PRACTITIONER

## 2024-08-27 RX ORDER — GABAPENTIN 600 MG/1
600 TABLET ORAL 3 TIMES DAILY
Qty: 90 TABLET | Refills: 1 | Status: SHIPPED | OUTPATIENT
Start: 2024-08-27 | End: 2024-09-26

## 2024-08-27 RX ORDER — SILDENAFIL 50 MG/1
50 TABLET, FILM COATED ORAL PRN
Qty: 30 TABLET | Refills: 3 | Status: SHIPPED | OUTPATIENT
Start: 2024-08-27

## 2024-08-27 NOTE — PROGRESS NOTES
Vineet Garcia  : 1965  Encounter date: 2024    This gabriela 59 y.o. male who presents with  Chief Complaint   Patient presents with    Annual Exam     Right knee pain after hiking started 2 days ago  Took 2 tramadol last night for pain  Fingers on both hands swollen       History of present illness:    HPI History and Physical      Vineet Garcia  YOB: 1965    Date of Service:  2024    Chief Complaint:   Vineet Garcia is a 59 y.o. male who  presents for physical examination.    HPI: PT is 59 year old male for annual exam.  Reviewed recent labs.  Pt with significant orthopedic history including neuropathy and arthritic concerns.  PT has been followed by Phoenix, requesting PCP to take over gabapentin medication.  Recent audiology testing completed, sensoneural hearing loss R> L , not a candidate for hearing aids at this time.  Pt also concerned about ED, reports trying viagra years ago with good results.  Has not had testosterone levels checked.  Up to date on .    Wt Readings from Last 3 Encounters:   24 118.5 kg (261 lb 3.2 oz)   24 117 kg (258 lb)   24 118.9 kg (262 lb 3.2 oz)     BP Readings from Last 3 Encounters:   24 136/86   24 134/85   24 126/72       Patient Active Problem List   Diagnosis    Chondromalacia of left knee    Good's esophagus with esophagitis    Left knee DJD    Right knee DJD    Cubital tunnel syndrome    Carpal tunnel syndrome    Diastasis recti    Metatarsalgia of right foot    Primary osteoarthritis of both knees    Arthritis of right knee    Vitamin D deficiency    Facial droop    Bell's palsy    YOUSIF on CPAP    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    Arthritis of left knee    History of total knee replacement, left 2019    Gastroesophageal reflux disease without esophagitis    Left foot pain    Bilateral leg edema    Obesity    Elevated BP without diagnosis of hypertension    Shortness of  replacement 03/21/2018    total right knee replacement    Hypertension     Sleep apnea     mild case, no c-pap      Past Surgical History:   Procedure Laterality Date    ANTERIOR CRUCIATE LIGAMENT REPAIR  1991    CARPAL TUNNEL RELEASE Left 10/07/2014    CARPAL TUNNEL RELEASE Right 11/25/2014    COLONOSCOPY      age 38    COLONOSCOPY N/A 03/29/2022    COLONOSCOPY DIAGNOSTIC performed by Jeff Stallworht MD at [facility]    ELBOW SURGERY      FOOT SURGERY Left     MENDOZA'S NEUROMA    JOINT REPLACEMENT Bilateral     right, 3/18    KNEE ARTHROSCOPY Bilateral     SYNOVECTOMY, AND CHONDROPLASTY LEFT KNEE    NERVE SURGERY Left 12/28/2023    LEFT TARSAL TUNNEL RELEASE performed by Justyn Phoenix MD at [facility] OR    OTHER SURGICAL HISTORY      wakes up fighting  and severe cough    OTHER SURGICAL HISTORY      acl removed    [place] TENDON SHEATH INCISION Left 10/18/2018    LEFT RING FINGER TRIGGER RELEASE performed by Que Kaur MD at [facility] OR    TOTAL HIP ARTHROPLASTY Left 07/2022    TOTAL KNEE ARTHROPLASTY Left 12/17/2019    LEFT TOTAL KNEE REPLACEMENT ROBOTIC ASSISTED performed by Eh Sol MD at [facility] OR    ULNAR TUNNEL RELEASE Left 10/07/2014    ulnar nerve decompression at elbow    ULNAR TUNNEL RELEASE Right 11/25/2014    UMBILICAL HERNIA REPAIR  06/25/2015    UPPER GASTROINTESTINAL ENDOSCOPY      UPPER GASTROINTESTINAL ENDOSCOPY  11/16/2017    Dr Stallworth      Family History   Problem Relation Age of Onset    Breast Cancer Mother     Cancer Father         prostate    Heart Disease Paternal Aunt     Heart Disease Paternal Grandmother     Heart Failure Paternal Grandmother     Heart Disease Paternal Aunt       Social History     Tobacco Use    Smoking status: Never    Smokeless tobacco: Never   Substance Use Topics    Alcohol use: Yes     Alcohol/week: 10.0 standard drinks of alcohol     Types: 10 Cans of beer per week     Comment: I average drinking about 10 beers  (12 oz) per week

## 2024-09-20 RX ORDER — CARVEDILOL 3.12 MG/1
3.12 TABLET ORAL 2 TIMES DAILY
Qty: 180 TABLET | Refills: 1 | Status: SHIPPED | OUTPATIENT
Start: 2024-09-20

## 2024-10-04 NOTE — PROGRESS NOTES
Ordering Provider:   Jacobo Duong MD - Diplomate, [facility]  [address] [place] [address], [place] [address]    Phone [phone]  Fax [phone]    Diagnosis: [x] YOUSIF  (G47.33) [] CSA (G47.31) []  Other:__________________   Length of Need: [] 13 months [x]  99 Months                                          []  Other:__________________   Machine (GILMA): [] Respironics Auto (with modem for remote monitoring)       [] Other:____________________    []  ResMed Auto (with modem for remote monitoring)    []  CPAP () [] Bilevel ()   Mode: Mode:   [] Auto [] Fixed [] Auto [] Spontaneous   Pmin:_________cmH2O      Pmax:_________cmH2O   P:_________cmH2O    EPAPmin:__________cmH2O IPAP:__________cmH2O     IPAPmax:__________cmH2O EPAP:__________cmH2O     PSmin:_______  PSmax:_______       (ResMed) PS:_________     Flex/EPR - 3 full time                          Ramp time: 30 min Flex/EPR - 3 full time                 Ramp time: 30 min   Ramp Pressure:___________cmH2O Ramp Pressure:____________cmH2O         Humidifier: [x] Heated ()                                               [] Passive     [x] Water chamber replacement ()/ 1 per 6 months   Mask:   [x] Nasal () /1 per 3 months [] Full Face () /1 per 3 months   [x] Patient choice -Size and fit mask [] Patient Choice - Size and fit mask   [x] Dispense: nasal cushion  [] Dispense:  [] Dispense:    [x] Headgear () / 1 per 3 months [] Headgear () / 1 per 3 months   [x] Replacement Nasal Cushion ()/2 per month [] Interface Replacement ()/1 per month   [] Replacement Nasal Pillows ()/2 per month       Tubing: [x] Heated ()/1 per 3 months                           [] Standard ()/1 per 3 months  [] Other:____________________   Filters: [x] Non-disposable ()/1 per 6 months                 [x] Ultra-Fine, Disposable ()/2 per month     Miscellaneous: [x] Chin Strap

## 2024-11-12 DIAGNOSIS — G62.9 NEUROPATHY: ICD-10-CM

## 2024-11-12 RX ORDER — GABAPENTIN 600 MG/1
600 TABLET ORAL 3 TIMES DAILY
Qty: 90 TABLET | Refills: 1 | Status: SHIPPED | OUTPATIENT
Start: 2024-11-12 | End: 2025-01-11

## 2024-11-26 NOTE — TELEPHONE ENCOUNTER
From: Vineet Garcia  To: Dr. Mario Loving  Sent: 7/18/2024 2:11 PM EDT  Subject: atorvastatin    [facility] and they said they need to get a refill request from you.  Can you pls refill my atorvastatin 40mg tablets for anohter 90 days plus refills?  Thank you  Vineet Garcia   Followed protocol

## 2024-12-02 RX ORDER — PANTOPRAZOLE SODIUM 40 MG/1
40 TABLET, DELAYED RELEASE ORAL DAILY
Qty: 90 TABLET | Refills: 0 | Status: SHIPPED | OUTPATIENT
Start: 2024-12-02

## 2024-12-17 ENCOUNTER — PATIENT MESSAGE (OUTPATIENT)
Dept: ENT CLINIC | Age: 59
End: 2024-12-17

## 2024-12-18 DIAGNOSIS — M10.9 GOUT, UNSPECIFIED CAUSE, UNSPECIFIED CHRONICITY, UNSPECIFIED SITE: Primary | ICD-10-CM

## 2025-01-18 DIAGNOSIS — G62.9 NEUROPATHY: ICD-10-CM

## 2025-01-20 ENCOUNTER — TELEPHONE (OUTPATIENT)
Dept: FAMILY MEDICINE CLINIC | Age: 60
End: 2025-01-20

## 2025-01-20 DIAGNOSIS — G62.9 NEUROPATHY: ICD-10-CM

## 2025-01-20 RX ORDER — GABAPENTIN 600 MG/1
600 TABLET ORAL 3 TIMES DAILY
Qty: 90 TABLET | Refills: 0 | Status: SHIPPED | OUTPATIENT
Start: 2025-01-20 | End: 2025-03-21

## 2025-01-20 RX ORDER — GABAPENTIN 600 MG/1
600 TABLET ORAL 3 TIMES DAILY
Qty: 90 TABLET | Refills: 1 | Status: SHIPPED | OUTPATIENT
Start: 2025-01-20 | End: 2025-03-21

## 2025-01-20 NOTE — TELEPHONE ENCOUNTER
Patient calling to get medication refill.     gabapentin (NEURONTIN) 600 MG tablet     [facility]/pharmacy #[id number] - [place], [place] - [address] [address] - P [phone] - f [phone]     Please advise

## 2025-01-21 ENCOUNTER — PATIENT MESSAGE (OUTPATIENT)
Dept: ENT CLINIC | Age: 60
End: 2025-01-21

## 2025-02-11 ENCOUNTER — TELEPHONE (OUTPATIENT)
Dept: FAMILY MEDICINE CLINIC | Age: 60
End: 2025-02-11

## 2025-02-11 RX ORDER — AMOXICILLIN 500 MG/1
CAPSULE ORAL
Qty: 4 CAPSULE | Refills: 1 | Status: SHIPPED | OUTPATIENT
Start: 2025-02-11

## 2025-02-11 NOTE — TELEPHONE ENCOUNTER
Patient calling stating he has a dental appointment this morning and requesting an antibiotic to be sent in before 10 am for that.     [facility]/PHARMACY #[id number] - [place], [place] - [address] [address] [phone] -  [phone] [[id number]]     Please advise.

## 2025-02-17 DIAGNOSIS — G62.9 NEUROPATHY: ICD-10-CM

## 2025-02-17 RX ORDER — GABAPENTIN 600 MG/1
600 TABLET ORAL 3 TIMES DAILY
Qty: 90 TABLET | Refills: 0 | Status: SHIPPED | OUTPATIENT
Start: 2025-02-17 | End: 2025-03-19

## 2025-02-18 RX ORDER — PANTOPRAZOLE SODIUM 40 MG/1
40 TABLET, DELAYED RELEASE ORAL DAILY
Qty: 90 TABLET | Refills: 0 | Status: SHIPPED | OUTPATIENT
Start: 2025-02-18

## 2025-03-10 DIAGNOSIS — G62.9 NEUROPATHY: ICD-10-CM

## 2025-03-10 RX ORDER — GABAPENTIN 600 MG/1
600 TABLET ORAL 3 TIMES DAILY
Qty: 90 TABLET | Refills: 0 | OUTPATIENT
Start: 2025-03-10 | End: 2025-04-09

## 2025-03-10 NOTE — TELEPHONE ENCOUNTER
Patient calling to get a refill on medication. Patient wants to change this prescription to be sent to the mail order. Patient wants to cancel the order for [facility]. Patient wanting to know if he can get this medication in a three month supply of refills instead of just one.     gabapentin (NEURONTIN) 600 MG tablet [[id number]]     [facility], [address] [phone] - F [phone] [[id number]]     Please advise.

## 2025-03-11 NOTE — TELEPHONE ENCOUNTER
Patient has appointment scheduled. He is completely out of medication and is wanting to know if you can send in a 10 day supply to the pharmacy.     [facility]/PHARMACY #[id number] - [place], [place] - [address] [address] [phone] - f [phone] [[id number]]

## 2025-03-12 ENCOUNTER — OFFICE VISIT (OUTPATIENT)
Dept: FAMILY MEDICINE CLINIC | Age: 60
End: 2025-03-12
Payer: COMMERCIAL

## 2025-03-12 VITALS
BODY MASS INDEX: 37.43 KG/M2 | DIASTOLIC BLOOD PRESSURE: 72 MMHG | TEMPERATURE: 97 F | SYSTOLIC BLOOD PRESSURE: 142 MMHG | OXYGEN SATURATION: 97 % | HEART RATE: 87 BPM | WEIGHT: 276 LBS

## 2025-03-12 DIAGNOSIS — Z12.5 SCREENING FOR MALIGNANT NEOPLASM OF PROSTATE: ICD-10-CM

## 2025-03-12 DIAGNOSIS — I10 PRIMARY HYPERTENSION: ICD-10-CM

## 2025-03-12 DIAGNOSIS — E55.9 VITAMIN D DEFICIENCY: Primary | ICD-10-CM

## 2025-03-12 DIAGNOSIS — K21.9 GASTROESOPHAGEAL REFLUX DISEASE WITHOUT ESOPHAGITIS: ICD-10-CM

## 2025-03-12 DIAGNOSIS — G62.9 NEUROPATHY: Primary | ICD-10-CM

## 2025-03-12 DIAGNOSIS — E55.9 VITAMIN D DEFICIENCY: ICD-10-CM

## 2025-03-12 DIAGNOSIS — E78.00 PURE HYPERCHOLESTEROLEMIA: ICD-10-CM

## 2025-03-12 DIAGNOSIS — Z23 NEED FOR PNEUMOCOCCAL VACCINE: ICD-10-CM

## 2025-03-12 DIAGNOSIS — Z00.00 PREVENTATIVE HEALTH CARE: Primary | ICD-10-CM

## 2025-03-12 DIAGNOSIS — R73.9 HYPERGLYCEMIA: ICD-10-CM

## 2025-03-12 PROCEDURE — 90677 PCV20 VACCINE IM: CPT | Performed by: NURSE PRACTITIONER

## 2025-03-12 PROCEDURE — 3077F SYST BP >= 140 MM HG: CPT | Performed by: NURSE PRACTITIONER

## 2025-03-12 PROCEDURE — 99214 OFFICE O/P EST MOD 30 MIN: CPT | Performed by: NURSE PRACTITIONER

## 2025-03-12 PROCEDURE — 90471 IMMUNIZATION ADMIN: CPT | Performed by: NURSE PRACTITIONER

## 2025-03-12 PROCEDURE — 3078F DIAST BP <80 MM HG: CPT | Performed by: NURSE PRACTITIONER

## 2025-03-12 RX ORDER — GABAPENTIN 600 MG/1
600 TABLET ORAL 3 TIMES DAILY
Qty: 30 TABLET | Refills: 0 | Status: SHIPPED | OUTPATIENT
Start: 2025-03-12 | End: 2025-03-12

## 2025-03-12 RX ORDER — GABAPENTIN 600 MG/1
600 TABLET ORAL 3 TIMES DAILY
Qty: 90 TABLET | Refills: 0 | Status: SHIPPED | OUTPATIENT
Start: 2025-03-12 | End: 2025-03-12

## 2025-03-12 RX ORDER — GABAPENTIN 600 MG/1
600 TABLET ORAL 3 TIMES DAILY
Qty: 30 TABLET | Refills: 0 | Status: SHIPPED | OUTPATIENT
Start: 2025-03-12 | End: 2025-03-22

## 2025-03-12 RX ORDER — PANTOPRAZOLE SODIUM 40 MG/1
40 TABLET, DELAYED RELEASE ORAL DAILY
Qty: 90 TABLET | Refills: 3 | Status: SHIPPED | OUTPATIENT
Start: 2025-03-12

## 2025-03-12 RX ORDER — GABAPENTIN 600 MG/1
600 TABLET ORAL 3 TIMES DAILY
Qty: 270 TABLET | Refills: 1 | Status: SHIPPED | OUTPATIENT
Start: 2025-03-12 | End: 2025-06-10

## 2025-03-12 SDOH — ECONOMIC STABILITY: FOOD INSECURITY: WITHIN THE PAST 12 MONTHS, THE FOOD YOU BOUGHT JUST DIDN'T LAST AND YOU DIDN'T HAVE MONEY TO GET MORE.: NEVER TRUE

## 2025-03-12 SDOH — ECONOMIC STABILITY: FOOD INSECURITY: WITHIN THE PAST 12 MONTHS, YOU WORRIED THAT YOUR FOOD WOULD RUN OUT BEFORE YOU GOT MONEY TO BUY MORE.: NEVER TRUE

## 2025-03-12 ASSESSMENT — PATIENT HEALTH QUESTIONNAIRE - PHQ9
SUM OF ALL RESPONSES TO PHQ QUESTIONS 1-9: 0
1. LITTLE INTEREST OR PLEASURE IN DOING THINGS: NOT AT ALL
SUM OF ALL RESPONSES TO PHQ QUESTIONS 1-9: 0
2. FEELING DOWN, DEPRESSED OR HOPELESS: NOT AT ALL

## 2025-03-12 NOTE — PROGRESS NOTES
Vineet Garcia  : 1965  Encounter date: 3/12/2025    This gabriela 59 y.o. male who presents with  Chief Complaint   Patient presents with    Medication Refill     Refills, would like 10 day fill sent to [facility] zoe       History of present illness:    HPI Pt is 59 year old male for follow up on chronic conditions.  Pt with neuropathy, needing short course refill gabapentin sent to local pharmacy, due to run out and typically gets medications thru mail order.  Pt with existing hyperlipidemia and hypertension, well controlled.  Due for labs and PE.  No other concerns at this time.  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       Current Outpatient Medications on File Prior to Visit   Medication Sig Dispense Refill    carvedilol (COREG) 3.125 MG tablet TAKE 1 TABLET BY MOUTH TWICE A  tablet 1    sildenafil (VIAGRA) 50 MG tablet Take 1 tablet by mouth as needed for Erectile Dysfunction 30 tablet 3    atorvastatin (LIPITOR) 40 MG tablet Take 1 tablet by mouth daily 90 tablet 3    diclofenac sodium (VOLTAREN) 1 % GEL APPLY UP TO 4 TIMES DAILY AS NEEDED FOR PAIN, #4 TUBES 400 g 5    Compression Stockings MISC by Does not apply route 2 each 2    CPAP Machine MISC by Does not apply route      Menthol, Topical Analgesic, (BIOFREEZE) 4 % GEL Apply topically       No current facility-administered medications on file prior to visit.      Allergies   Allergen Reactions    Excedrin Extra Strength [Aspirin-Acetaminophen-Caffeine] Rash    Red Dye #40 (Allura Red) Rash     Past Medical History:   Diagnosis Date    Acid reflux     Anesthesia complication 2002    x1-woke up fighting and severe cough-broke several blood vessels in face    Arthritis     Good's esophagus     Bell's palsy     Right side of face    H/O total knee replacement 2018    total right knee replacement    Hypertension     Sleep apnea     mild case, no c-pap      Past Surgical History:   Procedure Laterality Date

## 2025-04-02 NOTE — ANESTHESIA POSTPROCEDURE EVALUATION
Department of Anesthesiology  Postprocedure Note    Patient: Nataliia Knutson  MRN: [id number]  YOB: 1965  Date of evaluation: 12/28/2023    Procedure Summary       Date: 12/28/23 Room / Location: [address]    Anesthesia Start: 2824 Anesthesia Stop: 0643    Procedure: LEFT TARSAL TUNNEL RELEASE (Left: Ankle) Diagnosis:       Tarsal tunnel syndrome, left      (Tarsal tunnel syndrome, left [G57.52])    Surgeons: Kenrick Newby MD Responsible Provider: Boirs Nettles MD    Anesthesia Type: general ASA Status: 3            Anesthesia Type: No value filed. Justen Phase I: Justen Score: 9    Justen Phase II: Justen Score: 10    Anesthesia Post Evaluation    Patient location during evaluation: PACU  Airway patency: patent  Nausea & Vomiting: no vomiting  Cardiovascular status: hemodynamically stable  Respiratory status: acceptable  Hydration status: euvolemic  Multimodal analgesia pain management approach    No notable events documented.
No

## 2025-04-03 RX ORDER — CARVEDILOL 3.12 MG/1
3.12 TABLET ORAL 2 TIMES DAILY
Qty: 180 TABLET | Refills: 3 | Status: SHIPPED | OUTPATIENT
Start: 2025-04-03

## 2025-04-03 NOTE — TELEPHONE ENCOUNTER
Requested Prescriptions     Pending Prescriptions Disp Refills    carvedilol (COREG) 3.125 MG tablet [Pharmacy Med Name: CARVEDILOL 3.125 MG TABLET] 180 tablet 1     Sig: TAKE 1 TABLET BY MOUTH TWICE A DAY      Last OV:  2024 DKW    Next OV: 2025 Recall DKW     Last EK/10/2023     Last Filled: 2024 DKW

## 2025-04-14 RX ORDER — ATORVASTATIN CALCIUM 40 MG/1
40 TABLET, FILM COATED ORAL DAILY
Qty: 90 TABLET | Refills: 0 | Status: SHIPPED | OUTPATIENT
Start: 2025-04-14

## 2025-04-14 RX ORDER — CARVEDILOL 3.12 MG/1
3.12 TABLET ORAL 2 TIMES DAILY
Qty: 180 TABLET | Refills: 3 | OUTPATIENT
Start: 2025-04-14

## 2025-04-16 ENCOUNTER — PATIENT MESSAGE (OUTPATIENT)
Dept: CARDIOLOGY CLINIC | Age: 60
End: 2025-04-16

## 2025-04-16 RX ORDER — CARVEDILOL 3.12 MG/1
3.12 TABLET ORAL 2 TIMES DAILY
Qty: 180 TABLET | Refills: 3 | Status: SHIPPED | OUTPATIENT
Start: 2025-04-16

## 2025-04-16 NOTE — TELEPHONE ENCOUNTER
Previously sent to local [facility] pharmacy- patient is requesting it to be sent to mail order.     Last OV: 2024 DKW   Next OV: Recall list.   EK/10/2023

## 2025-04-18 ENCOUNTER — TELEPHONE (OUTPATIENT)
Dept: CARDIOLOGY CLINIC | Age: 60
End: 2025-04-18

## 2025-04-18 RX ORDER — ATORVASTATIN CALCIUM 40 MG/1
40 TABLET, FILM COATED ORAL DAILY
Qty: 90 TABLET | Refills: 0 | OUTPATIENT
Start: 2025-04-18

## 2025-04-18 NOTE — TELEPHONE ENCOUNTER
Called the pharmacy as it appears that the medication requested was sent in to be filled on 04/14/2025. Pharmacy staff stated that it was filled and sent out and pt should receive the medication on the 21st or 23. Pt called and informed of the information and  he verbalized understanding.

## 2025-04-18 NOTE — TELEPHONE ENCOUNTER
Medication Refill    Medication needing refilled:   atorvastatin (LIPITOR) 40 MG tablet     Dosage of the medication: 40mg    How are you taking this medication (QD, BID, TID, QID, PRN):  TAKE 1 TABLET BY MOUTH ONCE DAILY     30 or 90 day supply called in: 90    When will you run out of your medication:  5 days    Which Pharmacy are we sending the medication to?:  [facility]. [address] [phone] - F [phone]

## 2025-05-01 ENCOUNTER — PATIENT MESSAGE (OUTPATIENT)
Dept: ORTHOPEDIC SURGERY | Age: 60
End: 2025-05-01

## 2025-05-01 ENCOUNTER — PATIENT MESSAGE (OUTPATIENT)
Dept: CARDIOLOGY CLINIC | Age: 60
End: 2025-05-01

## 2025-05-01 ENCOUNTER — PATIENT MESSAGE (OUTPATIENT)
Dept: ENT CLINIC | Age: 60
End: 2025-05-01

## 2025-05-01 RX ORDER — AMOXICILLIN 500 MG/1
CAPSULE ORAL
Qty: 4 CAPSULE | Refills: 1 | Status: SHIPPED | OUTPATIENT
Start: 2025-05-01

## 2025-05-30 ENCOUNTER — RESULTS FOLLOW-UP (OUTPATIENT)
Dept: FAMILY MEDICINE CLINIC | Age: 60
End: 2025-05-30

## 2025-05-30 ENCOUNTER — HOSPITAL ENCOUNTER (OUTPATIENT)
Age: 60
Discharge: HOME | End: 2025-05-30
Payer: COMMERCIAL

## 2025-05-30 DIAGNOSIS — E55.9 VITAMIN D DEFICIENCY: ICD-10-CM

## 2025-05-30 DIAGNOSIS — Z12.5 SCREENING FOR MALIGNANT NEOPLASM OF PROSTATE: ICD-10-CM

## 2025-05-30 DIAGNOSIS — I10 PRIMARY HYPERTENSION: ICD-10-CM

## 2025-05-30 DIAGNOSIS — R73.9 HYPERGLYCEMIA: ICD-10-CM

## 2025-05-30 DIAGNOSIS — E78.00 PURE HYPERCHOLESTEROLEMIA: ICD-10-CM

## 2025-05-30 LAB
25(OH)D3 SERPL-MCNC: 20.9 NG/ML
ALBUMIN SERPL-MCNC: 3.9 G/DL (ref 3.4–5)
ALBUMIN/GLOB SERPL: 1.2 {RATIO} (ref 1.1–2.2)
ALP SERPL-CCNC: 127 U/L (ref 40–129)
ALT SERPL-CCNC: 35 U/L (ref 10–40)
ANION GAP SERPL CALCULATED.3IONS-SCNC: 11 MMOL/L (ref 3–16)
AST SERPL-CCNC: 33 U/L (ref 15–37)
BASOPHILS # BLD: 0 K/UL (ref 0–0.2)
BASOPHILS NFR BLD: 0.4 %
BILIRUB SERPL-MCNC: 0.3 MG/DL (ref 0–1)
BUN SERPL-MCNC: 13 MG/DL (ref 7–20)
CALCIUM SERPL-MCNC: 9 MG/DL (ref 8.3–10.6)
CHLORIDE SERPL-SCNC: 105 MMOL/L (ref 99–110)
CHOLEST SERPL-MCNC: 118 MG/DL (ref 0–199)
CO2 SERPL-SCNC: 24 MMOL/L (ref 21–32)
CREAT SERPL-MCNC: 0.8 MG/DL (ref 0.8–1.3)
CREAT UR-MCNC: 123 MG/DL (ref 39–259)
DEPRECATED RDW RBC AUTO: 14.8 % (ref 12.4–15.4)
EOSINOPHIL # BLD: 0.1 K/UL (ref 0–0.6)
EOSINOPHIL NFR BLD: 1.6 %
EST. AVERAGE GLUCOSE BLD GHB EST-MCNC: 116.9 MG/DL
GFR SERPLBLD CREATININE-BSD FMLA CKD-EPI: >90 ML/MIN/{1.73_M2}
GLUCOSE P FAST SERPL-MCNC: 117 MG/DL (ref 70–99)
HBA1C MFR BLD: 5.7 %
HCT VFR BLD AUTO: 42.5 % (ref 40.5–52.5)
HDLC SERPL-MCNC: 42 MG/DL (ref 40–60)
HGB BLD-MCNC: 14.2 G/DL (ref 13.5–17.5)
LDL CHOLESTEROL: 65 MG/DL
LYMPHOCYTES # BLD: 1.5 K/UL (ref 1–5.1)
LYMPHOCYTES NFR BLD: 17.6 %
MCH RBC QN AUTO: 27 PG (ref 26–34)
MCHC RBC AUTO-ENTMCNC: 33.4 G/DL (ref 31–36)
MCV RBC AUTO: 80.9 FL (ref 80–100)
MICROALBUMIN UR DL<=1MG/L-MCNC: <1.2 MG/DL
MICROALBUMIN/CREAT UR: NORMAL MG/G (ref 0–30)
MONOCYTES # BLD: 0.6 K/UL (ref 0–1.3)
MONOCYTES NFR BLD: 7.1 %
NEUTROPHILS # BLD: 6.4 K/UL (ref 1.7–7.7)
NEUTROPHILS NFR BLD: 73.3 %
PLATELET # BLD AUTO: 291 K/UL (ref 135–450)
PMV BLD AUTO: 7.9 FL (ref 5–10.5)
POTASSIUM SERPL-SCNC: 4.3 MMOL/L (ref 3.5–5.1)
PROT SERPL-MCNC: 7.1 G/DL (ref 6.4–8.2)
PSA SERPL DL<=0.01 NG/ML-MCNC: 0.43 NG/ML (ref 0–4)
RBC # BLD AUTO: 5.26 M/UL (ref 4.2–5.9)
SODIUM SERPL-SCNC: 140 MMOL/L (ref 136–145)
TRIGL SERPL-MCNC: 57 MG/DL (ref 0–150)
VLDLC SERPL CALC-MCNC: 11 MG/DL
WBC # BLD AUTO: 8.7 K/UL (ref 4–11)

## 2025-05-30 PROCEDURE — 80061 LIPID PANEL: CPT

## 2025-05-30 PROCEDURE — 85025 COMPLETE CBC W/AUTO DIFF WBC: CPT

## 2025-05-30 PROCEDURE — 82043 UR ALBUMIN QUANTITATIVE: CPT

## 2025-05-30 PROCEDURE — 82306 VITAMIN D 25 HYDROXY: CPT

## 2025-05-30 PROCEDURE — 80053 COMPREHEN METABOLIC PANEL: CPT

## 2025-05-30 PROCEDURE — 82570 ASSAY OF URINE CREATININE: CPT

## 2025-05-30 PROCEDURE — 84153 ASSAY OF PSA TOTAL: CPT

## 2025-05-30 PROCEDURE — 83036 HEMOGLOBIN GLYCOSYLATED A1C: CPT

## 2025-06-09 RX ORDER — FUROSEMIDE 40 MG/1
TABLET ORAL
Qty: 90 TABLET | Refills: 0 | Status: SHIPPED | OUTPATIENT
Start: 2025-06-09 | End: 2025-06-10 | Stop reason: SDUPTHER

## 2025-06-09 RX ORDER — ATORVASTATIN CALCIUM 40 MG/1
40 TABLET, FILM COATED ORAL DAILY
Qty: 90 TABLET | Refills: 0 | Status: SHIPPED | OUTPATIENT
Start: 2025-06-09 | End: 2025-06-10 | Stop reason: SDUPTHER

## 2025-06-09 NOTE — TELEPHONE ENCOUNTER
Medication:   Requested Prescriptions     Pending Prescriptions Disp Refills    furosemide (LASIX) 40 MG tablet [Pharmacy Med Name: FUROSEMIDE 40MG TABS] 90 tablet 0     Sig: To be filled by Provider    atorvastatin (LIPITOR) 40 MG tablet [Pharmacy Med Name: ATORVASTATIN CALCIUM 40MG TABS] 90 tablet 0     Sig: TAKE ONE TABLET BY MOUTH EVERY DAY          Patient Phone Number: [phone] (home)     Last appt: 3/12/2025   Next appt: 6/10/2025    Last OARRS:       5/29/2024     2:41 PM   RX Monitoring   Periodic Controlled Substance Monitoring Possible medication side effects, risk of tolerance/dependence & alternative treatments discussed.;No signs of potential drug abuse or diversion identified.     PDMP Monitoring:    Last PDMP Jeff as Reviewed ([place]):  Review User Review Instant Review Result   CIRO FLEMING 3/12/2025 12:28 PM Reviewed PDMP [1]     Preferred Pharmacy:   [facility] - [place], [place] - [address] [place] [address] - P [phone] -  [phone]  [address] [place] [address]  Phone: [phone] Fax: [phone]    [facility]/pharmacy #[id number] - [place], [place] - [address] [facility] [phone] - F [phone]  [address] [place] [address]  Phone: [phone] Fax: [phone]

## 2025-06-10 ENCOUNTER — OFFICE VISIT (OUTPATIENT)
Dept: FAMILY MEDICINE CLINIC | Age: 60
End: 2025-06-10
Payer: COMMERCIAL

## 2025-06-10 VITALS
TEMPERATURE: 98.2 F | DIASTOLIC BLOOD PRESSURE: 78 MMHG | SYSTOLIC BLOOD PRESSURE: 120 MMHG | BODY MASS INDEX: 37.32 KG/M2 | WEIGHT: 275.2 LBS | OXYGEN SATURATION: 97 % | HEART RATE: 73 BPM

## 2025-06-10 DIAGNOSIS — E78.5 HYPERLIPIDEMIA, UNSPECIFIED HYPERLIPIDEMIA TYPE: ICD-10-CM

## 2025-06-10 DIAGNOSIS — J30.2 SEASONAL ALLERGIES: ICD-10-CM

## 2025-06-10 DIAGNOSIS — R14.0 GASSINESS: ICD-10-CM

## 2025-06-10 DIAGNOSIS — B35.1 TOENAIL FUNGUS: ICD-10-CM

## 2025-06-10 DIAGNOSIS — Z00.00 PREVENTATIVE HEALTH CARE: Primary | ICD-10-CM

## 2025-06-10 DIAGNOSIS — R73.03 PREDIABETES: ICD-10-CM

## 2025-06-10 DIAGNOSIS — E55.9 VITAMIN D INSUFFICIENCY: ICD-10-CM

## 2025-06-10 DIAGNOSIS — I10 PRIMARY HYPERTENSION: ICD-10-CM

## 2025-06-10 DIAGNOSIS — G62.9 NEUROPATHY: ICD-10-CM

## 2025-06-10 PROCEDURE — 3074F SYST BP LT 130 MM HG: CPT | Performed by: NURSE PRACTITIONER

## 2025-06-10 PROCEDURE — 3078F DIAST BP <80 MM HG: CPT | Performed by: NURSE PRACTITIONER

## 2025-06-10 PROCEDURE — 99396 PREV VISIT EST AGE 40-64: CPT | Performed by: NURSE PRACTITIONER

## 2025-06-10 RX ORDER — ATORVASTATIN CALCIUM 40 MG/1
40 TABLET, FILM COATED ORAL DAILY
Qty: 90 TABLET | Refills: 3 | Status: SHIPPED | OUTPATIENT
Start: 2025-06-10

## 2025-06-10 RX ORDER — METHYLPREDNISOLONE 4 MG/1
TABLET ORAL
Qty: 1 KIT | Refills: 0 | Status: SHIPPED | OUTPATIENT
Start: 2025-06-10 | End: 2025-06-16

## 2025-06-10 RX ORDER — AZELASTINE 1 MG/ML
2 SPRAY, METERED NASAL 2 TIMES DAILY
Qty: 60 ML | Refills: 5 | Status: SHIPPED | OUTPATIENT
Start: 2025-06-10

## 2025-06-10 RX ORDER — CICLOPIROX 80 MG/ML
SOLUTION TOPICAL
Qty: 6 ML | Refills: 0 | Status: SHIPPED | OUTPATIENT
Start: 2025-06-10

## 2025-06-10 RX ORDER — GABAPENTIN 600 MG/1
600 TABLET ORAL 3 TIMES DAILY
Qty: 270 TABLET | Refills: 1 | Status: SHIPPED | OUTPATIENT
Start: 2025-06-10 | End: 2025-09-08

## 2025-06-10 RX ORDER — FUROSEMIDE 40 MG/1
40 TABLET ORAL DAILY
Qty: 90 TABLET | Refills: 3 | Status: SHIPPED | OUTPATIENT
Start: 2025-06-10

## 2025-06-10 NOTE — PROGRESS NOTES
REPAIR  06/25/2015    UPPER GASTROINTESTINAL ENDOSCOPY      UPPER GASTROINTESTINAL ENDOSCOPY  11/16/2017    Dr Stallworth      Family History   Problem Relation Age of Onset    Breast Cancer Mother     Cancer Father         prostate    Heart Disease Paternal Aunt     Heart Disease Paternal Grandmother     Heart Failure Paternal Grandmother     Heart Disease Paternal Aunt       Social History     Tobacco Use    Smoking status: Never    Smokeless tobacco: Never   Substance Use Topics    Alcohol use: Yes     Alcohol/week: 10.0 standard drinks of alcohol     Types: 10 Cans of beer per week     Comment: I average drinking about 10 beers  (12 oz) per week        Review of Systems    Objective:    /78 (BP Site: Left Upper Arm, Patient Position: Sitting, BP Cuff Size: Medium Adult)   Pulse 73   Temp 98.2 °F (36.8 °C) (Infrared)   Wt 124.8 kg (275 lb 3.2 oz)   SpO2 97%   BMI 37.32 kg/m²   Weight - Scale: 124.8 kg (275 lb 3.2 oz)     BP Readings from Last 3 Encounters:   06/10/25 120/78   03/12/25 (!) 142/72   08/27/24 136/86     Wt Readings from Last 3 Encounters:   06/10/25 124.8 kg (275 lb 3.2 oz)   03/12/25 125.2 kg (276 lb)   08/27/24 118.5 kg (261 lb 3.2 oz)     BMI Readings from Last 3 Encounters:   06/10/25 37.32 kg/m²   03/12/25 37.43 kg/m²   08/27/24 35.43 kg/m²       Physical Exam    Assessment/Plan    1. Preventative health care  Advised routine dental and vision  Increased exercise, healthy diet and weight loss  Advised living will  Colonoscopy due 2032  RSV  Covid booster    2. Seasonal allergies  Uncontrolled  - azelastine (ASTELIN) 0.1 % nasal spray; 2 sprays by Nasal route 2 times daily Use in each nostril as directed  Dispense: 60 mL; Refill: 5  Methylprednisolone  Suggested OTC antihistamines  Zyrtec samples provided    3. Gassiness  Advised to avoid known caustic foods and beverages  Slow down eating and drinkng  - Simethicone 41.667 MG CHEW; Take 41 mg by mouth daily  Dispense: 30 tablet; Refill:

## 2025-07-14 DIAGNOSIS — E78.5 HYPERLIPIDEMIA, UNSPECIFIED HYPERLIPIDEMIA TYPE: ICD-10-CM

## 2025-07-14 PROBLEM — I25.10 CORONARY ARTERY DISEASE INVOLVING NATIVE CORONARY ARTERY OF NATIVE HEART WITHOUT ANGINA PECTORIS: Status: ACTIVE | Noted: 2025-07-14

## 2025-07-14 PROBLEM — E78.2 MIXED HYPERLIPIDEMIA: Status: ACTIVE | Noted: 2025-07-14

## 2025-07-14 PROBLEM — I35.1 AORTIC REGURGITATION: Status: ACTIVE | Noted: 2025-07-14

## 2025-07-14 RX ORDER — ATORVASTATIN CALCIUM 40 MG/1
40 TABLET, FILM COATED ORAL DAILY
Qty: 90 TABLET | Refills: 0 | Status: SHIPPED | OUTPATIENT
Start: 2025-07-14

## 2025-07-14 NOTE — TELEPHONE ENCOUNTER
Medication:   Requested Prescriptions     Pending Prescriptions Disp Refills    atorvastatin (LIPITOR) 40 MG tablet [Pharmacy Med Name: ATORVASTATIN CALCIUM 40MG TABS] 90 tablet 0     Sig: TAKE ONE TABLET BY MOUTH EVERY DAY      Last Filled:      Patient Phone Number: [phone] (home)     Last appt: 6/10/2025   Next appt: Visit date not found    Last OARRS:       5/29/2024     2:41 PM   RX Monitoring   Periodic Controlled Substance Monitoring Possible medication side effects, risk of tolerance/dependence & alternative treatments discussed.;No signs of potential drug abuse or diversion identified.     PDMP Monitoring:    Last PDMP Jeff as Reviewed ([place]):  Review User Review Instant Review Result   CIRO FLEMING 6/10/2025  8:48 AM Reviewed PDMP [1]     Preferred Pharmacy:   [facility] - [place], [place] - [address] [facility] - P [phone] - F [phone] [address] [facility] [address]  Phone: [phone] Fax: [phone]

## 2025-07-21 RX ORDER — CARVEDILOL 3.12 MG/1
3.12 TABLET ORAL 2 TIMES DAILY
Qty: 180 TABLET | Refills: 3 | Status: SHIPPED | OUTPATIENT
Start: 2025-07-21

## 2025-07-23 DIAGNOSIS — I35.1 AORTIC VALVE INSUFFICIENCY, ETIOLOGY OF CARDIAC VALVE DISEASE UNSPECIFIED: Primary | ICD-10-CM

## 2025-07-28 ENCOUNTER — OFFICE VISIT (OUTPATIENT)
Dept: ENT CLINIC | Age: 60
End: 2025-07-28
Payer: COMMERCIAL

## 2025-07-28 ENCOUNTER — PROCEDURE VISIT (OUTPATIENT)
Dept: AUDIOLOGY | Age: 60
End: 2025-07-28
Payer: COMMERCIAL

## 2025-07-28 VITALS
HEART RATE: 79 BPM | DIASTOLIC BLOOD PRESSURE: 87 MMHG | HEIGHT: 72 IN | TEMPERATURE: 97.3 F | WEIGHT: 278 LBS | BODY MASS INDEX: 37.65 KG/M2 | SYSTOLIC BLOOD PRESSURE: 131 MMHG | OXYGEN SATURATION: 96 %

## 2025-07-28 DIAGNOSIS — R42 DIZZINESS: ICD-10-CM

## 2025-07-28 DIAGNOSIS — H90.3 SENSORY HEARING LOSS, BILATERAL: Primary | ICD-10-CM

## 2025-07-28 DIAGNOSIS — J30.9 ALLERGIC RHINITIS, UNSPECIFIED SEASONALITY, UNSPECIFIED TRIGGER: ICD-10-CM

## 2025-07-28 DIAGNOSIS — H91.8X3 ASYMMETRICAL HEARING LOSS: Primary | ICD-10-CM

## 2025-07-28 PROCEDURE — 3079F DIAST BP 80-89 MM HG: CPT | Performed by: STUDENT IN AN ORGANIZED HEALTH CARE EDUCATION/TRAINING PROGRAM

## 2025-07-28 PROCEDURE — 92557 COMPREHENSIVE HEARING TEST: CPT

## 2025-07-28 PROCEDURE — 3075F SYST BP GE 130 - 139MM HG: CPT | Performed by: STUDENT IN AN ORGANIZED HEALTH CARE EDUCATION/TRAINING PROGRAM

## 2025-07-28 PROCEDURE — 99213 OFFICE O/P EST LOW 20 MIN: CPT | Performed by: STUDENT IN AN ORGANIZED HEALTH CARE EDUCATION/TRAINING PROGRAM

## 2025-07-28 PROCEDURE — 92567 TYMPANOMETRY: CPT

## 2025-07-28 NOTE — PROGRESS NOTES
(PROTONIX) 40 MG tablet Take 1 tablet by mouth daily 90 tablet 3    sildenafil (VIAGRA) 50 MG tablet Take 1 tablet by mouth as needed for Erectile Dysfunction 30 tablet 3    diclofenac sodium (VOLTAREN) 1 % GEL APPLY UP TO 4 TIMES DAILY AS NEEDED FOR PAIN, #4 TUBES 400 g 5    Compression Stockings MISC by Does not apply route 2 each 2    CPAP Machine MISC by Does not apply route      Simethicone 41.667 MG CHEW Take 41 mg by mouth daily 30 tablet 1    amoxicillin (AMOXIL) 500 MG capsule 4 tablets by mouth 1 hour before dental appointment 4 capsule 1    gabapentin (NEURONTIN) 600 MG tablet Take 1 tablet by mouth 3 times daily for 10 days. 30 tablet 0    Menthol, Topical Analgesic, (BIOFREEZE) 4 % GEL Apply topically       No current facility-administered medications for this visit.       Review of Systems     REVIEW OF SYSTEM: See HPI above    PhysicalExam     Vitals:    07/28/25 1110   BP: 131/87   Pulse: 79   Temp: 97.3 °F (36.3 °C)   TempSrc: Infrared   SpO2: 96%   Weight: 126.1 kg (278 lb)   Height: 1.829 m (6')       PHYSICAL EXAM  /87   Pulse 79   Temp 97.3 °F (36.3 °C) (Infrared)   Ht 1.829 m (6')   Wt 126.1 kg (278 lb)   SpO2 96%   BMI 37.70 kg/m²     GENERAL: No acute distress, alert and oriented.  EYES: EOMI, Anti-icteric.  NOSE: On anterior rhinoscopy there is no epistaxis, nasal mucosa moist and normal appearing, no purulent drainage.   EARS: Normal external appearance; on portable otomicroscopy:     -Ad: External auditory canal without stenosis, tympanic membrane clear, no middle ear effusions or retractions     -As: External auditory canal without stenosis, tympanic membrane clear, no middle ear effusions or retractions  FACE: HB 1/6 bilaterally, symmetric appearing, sensation equal bilaterally  ORAL CAVITY: No masses or lesions visualized or palpated, uvula is midline, moist mucous membranes, no oropharyngeal masses or oropharyngeal obstruction  NECK: Normal range of motion, no thyromegaly,

## 2025-07-28 NOTE — PROGRESS NOTES
Vineet Garcia   1965, 60 y.o. male   [id number]       Referring Provider: Lee England DO  Referral Type: In an order in Epic    Reason for Visit: Evaluation of suspected change in hearing, tinnitus, or balance.    ADULT AUDIOLOGIC EVALUATION      Vineet Garcia is a 60 y.o. male seen today, 7/28/2025 , for a recheck  audiologic evaluation.  Patient was seen by Lee England DO following today's evaluation. Previous audiologic evaluation from 8/19/2024 is viewable in medical record.     AUDIOLOGIC AND OTHER PERTINENT MEDICAL HISTORY:      Vineet Garcia reports decreased hearing since his previous evaluation. He reports difficulty differentiating similar words and sounds. He reported vertigo lasting a few seconds during some head movements.     He denied otalgia, aural fullness, and tinnitus    Date: 7/28/2025     IMPRESSIONS:      Today's results revealed bilateral sensorineural hearing loss. Excellent speech understanding when in quiet. Tympanometry indicates normal middle ear function. Discussed test results and implications with patient. Discussed possible benefits of amplification.  Discussed scheduling a HAE. Results are consistent with 8/19/2024 audiologic evaluation.     Follow medical recommendations of Lee England DO.    ASSESSMENT AND FINDINGS:     Otoscopy unremarkable.    RIGHT EAR:  Hearing Sensitivity: normal hearing through 2000 Hz sloping to a mild to moderate sensorineural hearing los for 5346-8762 Hz   Speech Recognition Threshold: 10 dB HL  Word Recognition: Excellent 100%, based on NU-6 25-word list at 50 dBHL using recorded speech stimuli.    Tympanometry: Normal peak pressure with high compliance, Type Ad tympanogram, consistent with hypermobile tympanic membrane.       LEFT EAR:  Hearing Sensitivity: normal hearing through 2000 Hz sloping to a mild sensorineural hearing loss for 7340-0736 Hz   Speech Recognition Threshold: 5 dB HL  Word Recognition: Excellent 100%,

## 2025-07-31 ENCOUNTER — HOSPITAL ENCOUNTER (OUTPATIENT)
Age: 60
Discharge: HOME | End: 2025-08-02
Attending: INTERNAL MEDICINE
Payer: COMMERCIAL

## 2025-07-31 VITALS
HEIGHT: 72 IN | WEIGHT: 278 LBS | BODY MASS INDEX: 37.65 KG/M2 | DIASTOLIC BLOOD PRESSURE: 89 MMHG | SYSTOLIC BLOOD PRESSURE: 145 MMHG

## 2025-07-31 DIAGNOSIS — I35.1 AORTIC VALVE INSUFFICIENCY, ETIOLOGY OF CARDIAC VALVE DISEASE UNSPECIFIED: ICD-10-CM

## 2025-07-31 LAB
ECHO AO ASC DIAM: 3.6 CM
ECHO AO ASCENDING AORTA INDEX: 1.47 CM/M2
ECHO AO ROOT DIAM: 3.5 CM
ECHO AO ROOT INDEX: 1.43 CM/M2
ECHO AR MAX VEL PISA: 4 M/S
ECHO AV PEAK GRADIENT: 9 MMHG
ECHO AV PEAK VELOCITY: 1.5 M/S
ECHO AV REGURGITANT PHT: 520 MS
ECHO BSA: 2.53 M2
ECHO EST RA PRESSURE: 3 MMHG
ECHO IVC PROX: 1.6 CM
ECHO LA AREA 2C: 21.9 CM2
ECHO LA AREA 4C: 19.7 CM2
ECHO LA DIAMETER INDEX: 1.55 CM/M2
ECHO LA DIAMETER: 3.8 CM
ECHO LA MAJOR AXIS: 5.8 CM
ECHO LA MINOR AXIS: 5.3 CM
ECHO LA TO AORTIC ROOT RATIO: 1.09
ECHO LA VOL BP: 66 ML (ref 18–58)
ECHO LA VOL MOD A2C: 74 ML (ref 18–58)
ECHO LA VOL MOD A4C: 54 ML (ref 18–58)
ECHO LA VOL/BSA BIPLANE: 27 ML/M2 (ref 16–34)
ECHO LA VOLUME INDEX MOD A2C: 30 ML/M2 (ref 16–34)
ECHO LA VOLUME INDEX MOD A4C: 22 ML/M2 (ref 16–34)
ECHO LV E' LATERAL VELOCITY: 9.81 CM/S
ECHO LV E' SEPTAL VELOCITY: 7.46 CM/S
ECHO LV EDV A2C: 102 ML
ECHO LV EDV A4C: 103 ML
ECHO LV EDV INDEX A4C: 42 ML/M2
ECHO LV EDV NDEX A2C: 42 ML/M2
ECHO LV EF PHYSICIAN: 55 %
ECHO LV EJECTION FRACTION A2C: 62 %
ECHO LV EJECTION FRACTION A4C: 58 %
ECHO LV EJECTION FRACTION BIPLANE: 61 % (ref 55–100)
ECHO LV ESV A2C: 39 ML
ECHO LV ESV A4C: 44 ML
ECHO LV ESV INDEX A2C: 16 ML/M2
ECHO LV ESV INDEX A4C: 18 ML/M2
ECHO LV FRACTIONAL SHORTENING: 29 % (ref 28–44)
ECHO LV INTERNAL DIMENSION DIASTOLE INDEX: 1.84 CM/M2
ECHO LV INTERNAL DIMENSION DIASTOLIC: 4.5 CM (ref 4.2–5.9)
ECHO LV INTERNAL DIMENSION SYSTOLIC INDEX: 1.31 CM/M2
ECHO LV INTERNAL DIMENSION SYSTOLIC: 3.2 CM
ECHO LV IVSD: 1.2 CM (ref 0.6–1)
ECHO LV MASS 2D: 210.2 G (ref 88–224)
ECHO LV MASS INDEX 2D: 85.8 G/M2 (ref 49–115)
ECHO LV POSTERIOR WALL DIASTOLIC: 1.3 CM (ref 0.6–1)
ECHO LV RELATIVE WALL THICKNESS RATIO: 0.58
ECHO MV A VELOCITY: 0.83 M/S
ECHO MV E DECELERATION TIME (DT): 164 MS
ECHO MV E VELOCITY: 1.13 M/S
ECHO MV E/A RATIO: 1.36
ECHO MV E/E' LATERAL: 11.52
ECHO MV E/E' RATIO (AVERAGED): 13.33
ECHO MV E/E' SEPTAL: 15.15
ECHO PV MAX VELOCITY: 1.2 M/S
ECHO PV PEAK GRADIENT: 6 MMHG
ECHO RA AREA 4C: 10.9 CM2
ECHO RA END SYSTOLIC VOLUME APICAL 4 CHAMBER INDEX BSA: 8 ML/M2
ECHO RA VOLUME: 20 ML
ECHO RV BASAL DIMENSION: 2.7 CM
ECHO RV FREE WALL PEAK S': 12.6 CM/S
ECHO RV TAPSE: 1.7 CM (ref 1.7–?)

## 2025-07-31 PROCEDURE — 93306 TTE W/DOPPLER COMPLETE: CPT | Performed by: INTERNAL MEDICINE

## 2025-07-31 PROCEDURE — 93306 TTE W/DOPPLER COMPLETE: CPT

## 2025-08-01 ENCOUNTER — OFFICE VISIT (OUTPATIENT)
Dept: CARDIOLOGY CLINIC | Age: 60
End: 2025-08-01
Payer: COMMERCIAL

## 2025-08-01 VITALS
HEIGHT: 72 IN | OXYGEN SATURATION: 98 % | BODY MASS INDEX: 37.53 KG/M2 | SYSTOLIC BLOOD PRESSURE: 126 MMHG | WEIGHT: 277.1 LBS | DIASTOLIC BLOOD PRESSURE: 78 MMHG | HEART RATE: 80 BPM

## 2025-08-01 DIAGNOSIS — I25.10 CORONARY ARTERY DISEASE DUE TO LIPID RICH PLAQUE: ICD-10-CM

## 2025-08-01 DIAGNOSIS — I25.83 CORONARY ARTERY DISEASE DUE TO LIPID RICH PLAQUE: ICD-10-CM

## 2025-08-01 DIAGNOSIS — I10 ESSENTIAL HYPERTENSION: ICD-10-CM

## 2025-08-01 DIAGNOSIS — I35.1 NONRHEUMATIC AORTIC VALVE INSUFFICIENCY: Primary | ICD-10-CM

## 2025-08-01 PROCEDURE — 99214 OFFICE O/P EST MOD 30 MIN: CPT | Performed by: NURSE PRACTITIONER

## 2025-08-01 PROCEDURE — 3078F DIAST BP <80 MM HG: CPT | Performed by: NURSE PRACTITIONER

## 2025-08-01 PROCEDURE — 3074F SYST BP LT 130 MM HG: CPT | Performed by: NURSE PRACTITIONER

## 2025-08-01 NOTE — PROGRESS NOTES
[facility]     Outpatient Follow Up Note    Vineet Garcia is 60 y.o. male who presents today with a history of aortic regurg, non-obst CAD and HTN. His other hx inlcudes: venous insuff    CHIEF COMPLAINT / HPI:  Follow Up secondary to aortic valve disease    Subjective:   He denies significant chest pain. However, he has had times after doing something strenuous he'll get a little discomfort. It happens mostly when he uses his arms above his head  There is no SOB/RAMOS. The patient denies orthopnea/PND. He has YOUSIF using nasal prongs most of the time; its not comfortable.  The patient does not have swelling but his wife notice it in his toes. 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 He's borderline diabetic and would like to start something to help with wt loss. He is an  for the [employer] and finding himself getting burned out (does not feel save around teenagers). He's hoping to find something driving for [employer]. The patient is not experiencing palpitations or dizziness.     These symptoms show no change since the last OV.   With regard to medication therapy the patient has been compliant with prescribed regimen. They have tolerated therapy to date.     Past Medical History:   Diagnosis Date    Acid reflux     Anesthesia complication 2002    x1-woke up fighting and severe cough-broke several blood vessels in face    Arthritis     Good's esophagus     Bell's palsy     Right side of face    H/O total knee replacement 03/21/2018    total right knee replacement    Hypertension     Sleep apnea     mild case, no c-pap     Social History:    Social History     Tobacco Use   Smoking Status Never   Smokeless Tobacco Never     Current Medications:  Current Outpatient Medications   Medication Sig Dispense Refill    carvedilol (COREG) 3.125 MG tablet Take 1 tablet by mouth 2 times daily 180 tablet 3    atorvastatin (LIPITOR) 40 MG tablet TAKE ONE TABLET BY MOUTH EVERY DAY 90

## 2025-08-04 ENCOUNTER — RESULTS FOLLOW-UP (OUTPATIENT)
Dept: CARDIOLOGY CLINIC | Age: 60
End: 2025-08-04

## 2025-08-27 ENCOUNTER — OFFICE VISIT (OUTPATIENT)
Age: 60
End: 2025-08-27

## 2025-08-27 VITALS
BODY MASS INDEX: 38.5 KG/M2 | HEART RATE: 83 BPM | DIASTOLIC BLOOD PRESSURE: 82 MMHG | HEIGHT: 71 IN | SYSTOLIC BLOOD PRESSURE: 128 MMHG | OXYGEN SATURATION: 99 % | WEIGHT: 275 LBS | TEMPERATURE: 98.4 F

## 2025-08-27 DIAGNOSIS — S56.911A STRAIN OF RIGHT ELBOW, INITIAL ENCOUNTER: Primary | ICD-10-CM

## 2025-08-27 RX ORDER — MELOXICAM 15 MG/1
15 TABLET ORAL DAILY
Qty: 30 TABLET | Refills: 0 | Status: SHIPPED | OUTPATIENT
Start: 2025-08-27

## 2025-08-27 RX ORDER — MELOXICAM 15 MG/1
15 TABLET ORAL DAILY
Qty: 30 TABLET | Refills: 0 | Status: SHIPPED | OUTPATIENT
Start: 2025-08-27 | End: 2025-08-27

## (undated) DEVICE — APPLICATOR MEDICATED 26 CC SOLUTION HI LT ORNG CHLORAPREP

## (undated) DEVICE — YANKAUER,BULB TIP,W/O VENT,RIGID,STERILE: Brand: MEDLINE

## (undated) DEVICE — PADDING CAST W4INXL4YD ST COT RAYON MICROPLEATED HIGHLY

## (undated) DEVICE — 4-PORT MANIFOLD: Brand: NEPTUNE 2

## (undated) DEVICE — GLOVE ORANGE PI 8   MSG9080

## (undated) DEVICE — KIT INT FIX FEM TIB CKPT MAKOPLASTY

## (undated) DEVICE — SYRINGE, LUER LOCK, 10ML: Brand: MEDLINE

## (undated) DEVICE — 3M™ COBAN™ NL STERILE NON-LATEX SELF-ADHERENT WRAP, 2083S, 3 IN X 5 YD (7,5 CM X 4,5 M), 24 ROLLS/CASE: Brand: 3M™ COBAN™

## (undated) DEVICE — SHEET,DRAPE,53X77,STERILE: Brand: MEDLINE

## (undated) DEVICE — PACK PROCEDURE SURG EXTREMITY MFFOP CUST

## (undated) DEVICE — PIN BNE FIX TEMP L140MM DIA4MM MAKO

## (undated) DEVICE — BANDAGE COMPR W6INXL12FT SMOOTH FOR LIMB EXSANG ESMARCH

## (undated) DEVICE — SOLUTION IV 1000ML 0.9% SOD CHL FOR IRRIG PLAS CONT

## (undated) DEVICE — SOLUTION IV IRRIG POUR BRL 0.9% SODIUM CHL 2F7124

## (undated) DEVICE — Z DISCONTINUED USE 2368412 KIT POS LEG DISP

## (undated) DEVICE — BANDAGE COMPR W4INXL15FT BGE E SGL LAYERED CLP CLSR

## (undated) DEVICE — SYRINGE MED 30ML STD CLR PLAS LUERLOCK TIP N CTRL DISP

## (undated) DEVICE — COVER,MAYO STAND,STERILE: Brand: MEDLINE

## (undated) DEVICE — Z DISCONTINUED USE 2716304 SUTURE STRATAFIX SPRL SZ 3-0 L12IN ABSRB UD FS-1 L24MM 3/8

## (undated) DEVICE — 1010 S-DRAPE TOWEL DRAPE 10/BX: Brand: STERI-DRAPE™

## (undated) DEVICE — GLOVE SURG SZ 85 L12IN FNGR THK13MIL WHT ISOLEX POLYISOPRENE

## (undated) DEVICE — CORD,CAUTERY,BIPOLAR,STERILE: Brand: MEDLINE

## (undated) DEVICE — ZIMMER® STERILE DISPOSABLE TOURNIQUET CUFF WITH PLC, DUAL PORT, SINGLE BLADDER, 18 IN. (46 CM)

## (undated) DEVICE — T-DRAPE,EXTREMITY,STERILE: Brand: MEDLINE

## (undated) DEVICE — DECANTER BAG 9": Brand: MEDLINE INDUSTRIES, INC.

## (undated) DEVICE — CHLORAPREP 26ML ORANGE

## (undated) DEVICE — SUTURE ABSORBABLE MONOFILAMENT 1-0 OS8 14 IN STRATAFIX SPRL SXPD2B202

## (undated) DEVICE — GLOVE,SURG,SENSICARE SLT,LF,PF,8: Brand: MEDLINE

## (undated) DEVICE — GAUZE,SPONGE,4"X4",8PLY,STRL,LF,10/TRAY: Brand: MEDLINE

## (undated) DEVICE — CORD RETRCT SIL

## (undated) DEVICE — SUTURE VCRL SZ 1 L18IN ABSRB UD L36MM CT-1 1/2 CIR J841D

## (undated) DEVICE — DRESSING,GAUZE,XEROFORM,CURAD,1"X8",ST: Brand: CURAD

## (undated) DEVICE — INTENDED FOR TISSUE SEPARATION, AND OTHER PROCEDURES THAT REQUIRE A SHARP SURGICAL BLADE TO PUNCTURE OR CUT.: Brand: BARD-PARKER ® STAINLESS STEEL BLADES

## (undated) DEVICE — HYPODERMIC SAFETY NEEDLE: Brand: MAGELLAN

## (undated) DEVICE — Device: Brand: POWER-FLO®

## (undated) DEVICE — MEDICINE CUP, GRADUATED, STER: Brand: MEDLINE

## (undated) DEVICE — PAD,ABDOMINAL,8"X10",ST,LF: Brand: MEDLINE

## (undated) DEVICE — ELECTRODE PT RET AD L9FT HI MOIST COND ADH HYDRGEL CORDED

## (undated) DEVICE — BLADE SURG SAW STD S STL OSC W/ SERR EDGE DISP

## (undated) DEVICE — GLOVE ORANGE PI 8 1/2   MSG9085

## (undated) DEVICE — ZIMMER® STERILE DISPOSABLE TOURNIQUET CUFF WITH PLC, DUAL PORT, SINGLE BLADDER, 24 IN. (61 CM)

## (undated) DEVICE — MERCY HEALTH WEST TURNOVER: Brand: MEDLINE INDUSTRIES, INC.

## (undated) DEVICE — COVER LT HNDL BLU PLAS

## (undated) DEVICE — GLOVE,SURG,SENSICARE SLT,LF,PF,8.5: Brand: MEDLINE

## (undated) DEVICE — GLOVE ORTHO 8   MSG9480

## (undated) DEVICE — GLOVE SURG SZ 65 THK91MIL LTX FREE SYN POLYISOPRENE

## (undated) DEVICE — SYRINGE 60ML BULB IRRIG ST LF

## (undated) DEVICE — Z CONVERTED USE 2273163 BANDAGE COMPR W3INXL4.5YD LTWT E EC SGL LAYERED CLP CLSR

## (undated) DEVICE — MASTISOL ADHESIVE LIQ 2/3ML

## (undated) DEVICE — COTTON UNDERCAST PADDING,CRIMPED FINISH: Brand: WEBRIL

## (undated) DEVICE — SUTURE STRATAFIX SPRL SZ 2 0 L14IN ABSRB UD MH L36MM 1 2 CIR SXMD2B401

## (undated) DEVICE — 3M™ STERI-STRIP™ REINFORCED ADHESIVE SKIN CLOSURES, R1547, 1/2 IN X 4 IN (12 MM X 100 MM), 6 STRIPS/ENVELOPE: Brand: 3M™ STERI-STRIP™

## (undated) DEVICE — SYRINGE MED 3ML CLR PLAS STD N CTRL LUERLOCK TIP DISP

## (undated) DEVICE — DRAPE HND W114XL142IN BLU POLYPR W O PCH FEN CRD AND TB HLDR

## (undated) DEVICE — BANDAGE COBAN 4 IN COMPR W4INXL5YD FOAM COHESIVE QUIK STK SELF ADH SFT

## (undated) DEVICE — DRAPE,U/ SHT,SPLIT,PLAS,STERIL: Brand: MEDLINE

## (undated) DEVICE — NEEDLE HYPO 22GA L1.5IN BLK POLYPR HUB S STL REG BVL STR

## (undated) DEVICE — STOCKINETTE,IMPERVIOUS,12X48,STERILE: Brand: MEDLINE

## (undated) DEVICE — PAD,NON-ADHERENT,3X8,STERILE,LF,1/PK: Brand: MEDLINE

## (undated) DEVICE — SOLUTION IRRIG 500ML 0.9% SOD CHLO USP POUR PLAS BTL

## (undated) DEVICE — SOLUTION IV IRRIG 500ML 0.9% SODIUM CHL 2F7123

## (undated) DEVICE — SUTURE NONABSORBABLE MONOFILAMENT 4-0 PS-2 18 IN BLK ETHILON 1667G

## (undated) DEVICE — NEEDLE HYPO 18GA L1.5IN THN WALL PIVOTING SHLD BVL ORIENTED

## (undated) DEVICE — ESMARK: Brand: DEROYAL

## (undated) DEVICE — PADDING CAST W6INXL4YD ST COT RAYON MICROPLEATED HIGHLY

## (undated) DEVICE — KIT TRK KNEE PROC VIZADISC

## (undated) DEVICE — TOTAL BASIC PK

## (undated) DEVICE — SUTURE MCRYL + SZ 4-0 L27IN ABSRB UD L19MM PS-2 3/8 CIR MCP426H

## (undated) DEVICE — CUTTER SURG OD42MM PAT KNEE DISP FOR RM SYS XCELERATE

## (undated) DEVICE — HANDPIECE SET WITH HIGH FLOW TIP AND SUCTION TUBE: Brand: INTERPULSE

## (undated) DEVICE — ZIMMER® STERILE DISPOSABLE TOURNIQUET CUFF WITH PLC, DUAL PORT, SINGLE BLADDER, 34 IN. (86 CM)

## (undated) DEVICE — STERILE TOTAL KNEE DRAPE PACK: Brand: CARDINAL HEALTH

## (undated) DEVICE — PIN BNE FIX L110MM DIA32MM

## (undated) DEVICE — Z DUP USE 2701075 SYSTEM SKIN CLSR 42CM DERMBND PRINEO

## (undated) DEVICE — SOLUTION PREP POVIDONE IOD FOR SKIN MUCOUS MEM PRIOR TO

## (undated) DEVICE — SUTURE VCRL + SZ 2-0 L27IN ABSRB WHT SH 1/2 CIR TAPERCUT VCP417H

## (undated) DEVICE — TOWEL,OR,DSP,ST,BLUE,STD,4/PK,20PK/CS: Brand: MEDLINE

## (undated) DEVICE — KIT DRP FOR RIO ROBOTIC ARM ASST SYS

## (undated) DEVICE — GLOVE SURG SZ 65 L12IN THK75MIL DK GRN LTX FREE

## (undated) DEVICE — GOWN,REINF,POLY,AURORA,XLNG/XXL,STRL: Brand: MEDLINE

## (undated) DEVICE — SPONGE LAP W18XL18IN WHT COT 4 PLY FLD STRUNG RADPQ DISP ST 2 PER PACK

## (undated) DEVICE — COVER,TABLE,77X90,STERILE: Brand: MEDLINE

## (undated) DEVICE — MATTRESS MAXI AIR TRNSF SGL PT USE DCS 37 45 48 52 75

## (undated) DEVICE — SUTURE VCRL + SZ 3-0 L18IN ABSRB UD SH 1/2 CIR TAPERCUT NDL VCP864D